# Patient Record
Sex: FEMALE | Race: WHITE | NOT HISPANIC OR LATINO
[De-identification: names, ages, dates, MRNs, and addresses within clinical notes are randomized per-mention and may not be internally consistent; named-entity substitution may affect disease eponyms.]

---

## 2017-06-21 ENCOUNTER — APPOINTMENT (OUTPATIENT)
Dept: SURGERY | Facility: CLINIC | Age: 52
End: 2017-06-21

## 2017-06-22 ENCOUNTER — RESULT REVIEW (OUTPATIENT)
Age: 52
End: 2017-06-22

## 2017-06-22 ENCOUNTER — APPOINTMENT (OUTPATIENT)
Dept: ULTRASOUND IMAGING | Facility: IMAGING CENTER | Age: 52
End: 2017-06-22

## 2017-06-22 ENCOUNTER — OUTPATIENT (OUTPATIENT)
Dept: OUTPATIENT SERVICES | Facility: HOSPITAL | Age: 52
LOS: 1 days | End: 2017-06-22
Payer: COMMERCIAL

## 2017-06-22 DIAGNOSIS — Z00.8 ENCOUNTER FOR OTHER GENERAL EXAMINATION: ICD-10-CM

## 2017-06-22 PROCEDURE — 19083 BX BREAST 1ST LESION US IMAG: CPT

## 2017-06-22 PROCEDURE — 77065 DX MAMMO INCL CAD UNI: CPT

## 2017-06-22 PROCEDURE — 88360 TUMOR IMMUNOHISTOCHEM/MANUAL: CPT

## 2017-06-22 PROCEDURE — 88305 TISSUE EXAM BY PATHOLOGIST: CPT

## 2017-06-22 PROCEDURE — A4648: CPT

## 2017-06-23 LAB — SURGICAL PATHOLOGY STUDY: SIGNIFICANT CHANGE UP

## 2017-06-26 ENCOUNTER — APPOINTMENT (OUTPATIENT)
Dept: MRI IMAGING | Facility: CLINIC | Age: 52
End: 2017-06-26

## 2017-06-26 ENCOUNTER — OUTPATIENT (OUTPATIENT)
Dept: OUTPATIENT SERVICES | Facility: HOSPITAL | Age: 52
LOS: 1 days | End: 2017-06-26
Payer: COMMERCIAL

## 2017-06-26 DIAGNOSIS — Z00.8 ENCOUNTER FOR OTHER GENERAL EXAMINATION: ICD-10-CM

## 2017-06-26 PROCEDURE — C8937: CPT

## 2017-06-26 PROCEDURE — C8908: CPT

## 2017-06-27 ENCOUNTER — OUTPATIENT (OUTPATIENT)
Dept: OUTPATIENT SERVICES | Facility: HOSPITAL | Age: 52
LOS: 1 days | Discharge: HOME | End: 2017-06-27

## 2017-06-27 ENCOUNTER — APPOINTMENT (OUTPATIENT)
Dept: NUCLEAR MEDICINE | Facility: IMAGING CENTER | Age: 52
End: 2017-06-27

## 2017-06-27 ENCOUNTER — APPOINTMENT (OUTPATIENT)
Dept: CT IMAGING | Facility: IMAGING CENTER | Age: 52
End: 2017-06-27

## 2017-06-27 ENCOUNTER — OUTPATIENT (OUTPATIENT)
Dept: OUTPATIENT SERVICES | Facility: HOSPITAL | Age: 52
LOS: 1 days | End: 2017-06-27
Payer: COMMERCIAL

## 2017-06-27 DIAGNOSIS — Z00.8 ENCOUNTER FOR OTHER GENERAL EXAMINATION: ICD-10-CM

## 2017-06-27 PROCEDURE — 74177 CT ABD & PELVIS W/CONTRAST: CPT

## 2017-06-27 PROCEDURE — A9561: CPT

## 2017-06-27 PROCEDURE — 71260 CT THORAX DX C+: CPT

## 2017-06-27 PROCEDURE — 78306 BONE IMAGING WHOLE BODY: CPT

## 2017-06-29 ENCOUNTER — APPOINTMENT (OUTPATIENT)
Dept: HEMATOLOGY ONCOLOGY | Facility: CLINIC | Age: 52
End: 2017-06-29

## 2017-06-29 ENCOUNTER — RESULT REVIEW (OUTPATIENT)
Age: 52
End: 2017-06-29

## 2017-06-29 ENCOUNTER — TRANSCRIPTION ENCOUNTER (OUTPATIENT)
Age: 52
End: 2017-06-29

## 2017-06-29 VITALS
OXYGEN SATURATION: 98 % | TEMPERATURE: 98.5 F | HEART RATE: 112 BPM | WEIGHT: 238.1 LBS | BODY MASS INDEX: 37.37 KG/M2 | SYSTOLIC BLOOD PRESSURE: 130 MMHG | HEIGHT: 66.93 IN | DIASTOLIC BLOOD PRESSURE: 88 MMHG | RESPIRATION RATE: 16 BRPM

## 2017-06-29 DIAGNOSIS — Z87.42 PERSONAL HISTORY OF OTHER DISEASES OF THE FEMALE GENITAL TRACT: ICD-10-CM

## 2017-06-29 DIAGNOSIS — N63 UNSPECIFIED LUMP IN BREAST: ICD-10-CM

## 2017-06-29 DIAGNOSIS — N64.89 OTHER SPECIFIED DISORDERS OF BREAST: ICD-10-CM

## 2017-06-29 DIAGNOSIS — Z78.9 OTHER SPECIFIED HEALTH STATUS: ICD-10-CM

## 2017-06-29 DIAGNOSIS — Z80.3 FAMILY HISTORY OF MALIGNANT NEOPLASM OF BREAST: ICD-10-CM

## 2017-06-29 LAB
HCT VFR BLD CALC: 46 % — HIGH (ref 34.5–45)
HGB BLD-MCNC: 15.5 G/DL — SIGNIFICANT CHANGE UP (ref 11.5–15.5)
MCHC RBC-ENTMCNC: 30.2 PG — SIGNIFICANT CHANGE UP (ref 27–34)
MCHC RBC-ENTMCNC: 33.8 G/DL — SIGNIFICANT CHANGE UP (ref 32–36)
MCV RBC AUTO: 89.3 FL — SIGNIFICANT CHANGE UP (ref 80–100)
PLATELET # BLD AUTO: 249 K/UL — SIGNIFICANT CHANGE UP (ref 150–400)
RBC # BLD: 5.15 M/UL — SIGNIFICANT CHANGE UP (ref 3.8–5.2)
RBC # FLD: 11.4 % — SIGNIFICANT CHANGE UP (ref 10.3–14.5)
WBC # BLD: 9.3 K/UL — SIGNIFICANT CHANGE UP (ref 3.8–10.5)
WBC # FLD AUTO: 9.3 K/UL — SIGNIFICANT CHANGE UP (ref 3.8–10.5)

## 2017-06-30 PROBLEM — Z80.3 FAMILY HISTORY OF MALIGNANT NEOPLASM OF BREAST: Status: ACTIVE | Noted: 2017-06-30

## 2017-06-30 PROBLEM — Z87.42 HISTORY OF POLYCYSTIC OVARIAN SYNDROME: Status: RESOLVED | Noted: 2017-06-30 | Resolved: 2017-06-30

## 2017-06-30 PROBLEM — Z78.9 SOCIAL ALCOHOL USE: Status: ACTIVE | Noted: 2017-06-30

## 2017-06-30 LAB
25(OH)D3 SERPL-MCNC: 28.8 NG/ML
ALBUMIN SERPL ELPH-MCNC: 4.6 G/DL
ALP BLD-CCNC: 81 U/L
ALT SERPL-CCNC: 20 U/L
ANION GAP SERPL CALC-SCNC: 15 MMOL/L
APTT BLD: 30.8 SEC
AST SERPL-CCNC: 23 U/L
BILIRUB SERPL-MCNC: 0.4 MG/DL
BUN SERPL-MCNC: 12 MG/DL
CALCIUM SERPL-MCNC: 10 MG/DL
CANCER AG27-29 SERPL-ACNC: 19.4 U/ML
CEA SERPL-MCNC: 3.3 NG/ML
CHLORIDE SERPL-SCNC: 102 MMOL/L
CO2 SERPL-SCNC: 25 MMOL/L
CREAT SERPL-MCNC: 0.87 MG/DL
GLUCOSE SERPL-MCNC: 102 MG/DL
HBV CORE IGG+IGM SER QL: NONREACTIVE
HBV CORE IGM SER QL: NONREACTIVE
HBV SURFACE AB SER QL: NONREACTIVE
HBV SURFACE AG SER QL: NONREACTIVE
HCV AB SER QL: NONREACTIVE
HCV S/CO RATIO: 0.08 S/CO
INR PPP: 1.1 RATIO
POTASSIUM SERPL-SCNC: 4.1 MMOL/L
PROT SERPL-MCNC: 7.7 G/DL
PT BLD: 12.4 SEC
SODIUM SERPL-SCNC: 142 MMOL/L

## 2017-06-30 RX ORDER — CHROMIUM 200 MCG
1000 TABLET ORAL DAILY
Refills: 0 | Status: ACTIVE | COMMUNITY
Start: 2017-06-30

## 2017-07-03 ENCOUNTER — APPOINTMENT (OUTPATIENT)
Dept: OBGYN | Facility: CLINIC | Age: 52
End: 2017-07-03

## 2017-07-03 DIAGNOSIS — C50.911 MALIGNANT NEOPLASM OF UNSPECIFIED SITE OF RIGHT FEMALE BREAST: ICD-10-CM

## 2017-07-05 ENCOUNTER — APPOINTMENT (OUTPATIENT)
Dept: ULTRASOUND IMAGING | Facility: CLINIC | Age: 52
End: 2017-07-05

## 2017-07-05 ENCOUNTER — OUTPATIENT (OUTPATIENT)
Dept: OUTPATIENT SERVICES | Facility: HOSPITAL | Age: 52
LOS: 1 days | End: 2017-07-05
Payer: COMMERCIAL

## 2017-07-05 DIAGNOSIS — Z00.8 ENCOUNTER FOR OTHER GENERAL EXAMINATION: ICD-10-CM

## 2017-07-05 PROCEDURE — 76856 US EXAM PELVIC COMPLETE: CPT

## 2017-07-05 PROCEDURE — 76830 TRANSVAGINAL US NON-OB: CPT

## 2017-07-11 ENCOUNTER — OUTPATIENT (OUTPATIENT)
Dept: OUTPATIENT SERVICES | Facility: HOSPITAL | Age: 52
LOS: 1 days | End: 2017-07-11
Payer: COMMERCIAL

## 2017-07-11 VITALS
OXYGEN SATURATION: 100 % | HEART RATE: 99 BPM | RESPIRATION RATE: 16 BRPM | TEMPERATURE: 99 F | HEIGHT: 67 IN | WEIGHT: 240.08 LBS | SYSTOLIC BLOOD PRESSURE: 119 MMHG | DIASTOLIC BLOOD PRESSURE: 86 MMHG

## 2017-07-11 DIAGNOSIS — C50.919 MALIGNANT NEOPLASM OF UNSPECIFIED SITE OF UNSPECIFIED FEMALE BREAST: ICD-10-CM

## 2017-07-11 DIAGNOSIS — Z01.818 ENCOUNTER FOR OTHER PREPROCEDURAL EXAMINATION: ICD-10-CM

## 2017-07-11 DIAGNOSIS — Z98.891 HISTORY OF UTERINE SCAR FROM PREVIOUS SURGERY: Chronic | ICD-10-CM

## 2017-07-11 DIAGNOSIS — Z90.49 ACQUIRED ABSENCE OF OTHER SPECIFIED PARTS OF DIGESTIVE TRACT: Chronic | ICD-10-CM

## 2017-07-11 PROCEDURE — G0463: CPT

## 2017-07-11 RX ORDER — CEFOTETAN DISODIUM 1 G
2 VIAL (EA) INJECTION ONCE
Qty: 0 | Refills: 0 | Status: DISCONTINUED | OUTPATIENT
Start: 2017-07-14 | End: 2017-07-29

## 2017-07-11 NOTE — H&P PST ADULT - NSANTHOSAYNRD_GEN_A_CORE
No. DAMIR screening performed.  STOP BANG Legend: 0-2 = LOW Risk; 3-4 = INTERMEDIATE Risk; 5-8 = HIGH Risk

## 2017-07-11 NOTE — H&P PST ADULT - HISTORY OF PRESENT ILLNESS
52 year old female c/o recurrent mastitis x 3-4 years; recent work up showed breast cancer presents to [facility] for laparoscopic bilateral salpingo oophorectomy  possible exploratory laparotomy due to type of breast cancer

## 2017-07-14 ENCOUNTER — APPOINTMENT (OUTPATIENT)
Dept: OBGYN | Facility: HOSPITAL | Age: 52
End: 2017-07-14

## 2017-07-14 ENCOUNTER — TRANSCRIPTION ENCOUNTER (OUTPATIENT)
Age: 52
End: 2017-07-14

## 2017-07-14 ENCOUNTER — RESULT REVIEW (OUTPATIENT)
Age: 52
End: 2017-07-14

## 2017-07-14 ENCOUNTER — OUTPATIENT (OUTPATIENT)
Dept: OUTPATIENT SERVICES | Facility: HOSPITAL | Age: 52
LOS: 1 days | End: 2017-07-14
Payer: COMMERCIAL

## 2017-07-14 VITALS
DIASTOLIC BLOOD PRESSURE: 85 MMHG | TEMPERATURE: 98 F | RESPIRATION RATE: 20 BRPM | WEIGHT: 240.3 LBS | HEART RATE: 100 BPM | HEIGHT: 67 IN | SYSTOLIC BLOOD PRESSURE: 123 MMHG | OXYGEN SATURATION: 100 %

## 2017-07-14 DIAGNOSIS — Z90.49 ACQUIRED ABSENCE OF OTHER SPECIFIED PARTS OF DIGESTIVE TRACT: Chronic | ICD-10-CM

## 2017-07-14 DIAGNOSIS — Z98.891 HISTORY OF UTERINE SCAR FROM PREVIOUS SURGERY: Chronic | ICD-10-CM

## 2017-07-14 DIAGNOSIS — C50.919 MALIGNANT NEOPLASM OF UNSPECIFIED SITE OF UNSPECIFIED FEMALE BREAST: ICD-10-CM

## 2017-07-14 LAB — HCG UR QL: NEGATIVE — SIGNIFICANT CHANGE UP

## 2017-07-14 PROCEDURE — 88302 TISSUE EXAM BY PATHOLOGIST: CPT | Mod: 26

## 2017-07-14 PROCEDURE — 52000 CYSTOURETHROSCOPY: CPT | Mod: 59

## 2017-07-14 PROCEDURE — 58661 LAPAROSCOPY REMOVE ADNEXA: CPT | Mod: 22

## 2017-07-14 PROCEDURE — 88305 TISSUE EXAM BY PATHOLOGIST: CPT | Mod: 26

## 2017-07-14 RX ORDER — OXYCODONE HYDROCHLORIDE 5 MG/1
5 TABLET ORAL EVERY 4 HOURS
Qty: 0 | Refills: 0 | Status: DISCONTINUED | OUTPATIENT
Start: 2017-07-14 | End: 2017-07-14

## 2017-07-14 RX ORDER — ACETAMINOPHEN 500 MG
975 TABLET ORAL EVERY 6 HOURS
Qty: 0 | Refills: 0 | Status: DISCONTINUED | OUTPATIENT
Start: 2017-07-14 | End: 2017-07-29

## 2017-07-14 RX ORDER — LIDOCAINE HCL 20 MG/ML
0.2 VIAL (ML) INJECTION ONCE
Qty: 0 | Refills: 0 | Status: DISCONTINUED | OUTPATIENT
Start: 2017-07-14 | End: 2017-07-14

## 2017-07-14 RX ORDER — IBUPROFEN 200 MG
600 TABLET ORAL EVERY 6 HOURS
Qty: 0 | Refills: 0 | Status: DISCONTINUED | OUTPATIENT
Start: 2017-07-14 | End: 2017-07-29

## 2017-07-14 RX ORDER — HYDROMORPHONE HYDROCHLORIDE 2 MG/ML
0.5 INJECTION INTRAMUSCULAR; INTRAVENOUS; SUBCUTANEOUS
Qty: 0 | Refills: 0 | Status: DISCONTINUED | OUTPATIENT
Start: 2017-07-14 | End: 2017-07-15

## 2017-07-14 RX ORDER — SODIUM CHLORIDE 9 MG/ML
3 INJECTION INTRAMUSCULAR; INTRAVENOUS; SUBCUTANEOUS EVERY 8 HOURS
Qty: 0 | Refills: 0 | Status: DISCONTINUED | OUTPATIENT
Start: 2017-07-14 | End: 2017-07-14

## 2017-07-14 RX ORDER — ONDANSETRON 8 MG/1
4 TABLET, FILM COATED ORAL EVERY 4 HOURS
Qty: 0 | Refills: 0 | Status: DISCONTINUED | OUTPATIENT
Start: 2017-07-14 | End: 2017-07-15

## 2017-07-14 RX ORDER — SODIUM CHLORIDE 9 MG/ML
1000 INJECTION, SOLUTION INTRAVENOUS
Qty: 0 | Refills: 0 | Status: DISCONTINUED | OUTPATIENT
Start: 2017-07-14 | End: 2017-07-29

## 2017-07-14 RX ORDER — OXYCODONE HYDROCHLORIDE 5 MG/1
1 TABLET ORAL
Qty: 15 | Refills: 0 | OUTPATIENT
Start: 2017-07-14

## 2017-07-14 NOTE — BRIEF OPERATIVE NOTE - OPERATION/FINDINGS
extensive adnexal and bowel adhesions, fibroid uterus, no perforations or lesions noted in bladder on cystoscopy

## 2017-07-14 NOTE — ASU DISCHARGE PLAN (ADULT/PEDIATRIC). - MEDICATION SUMMARY - MEDICATIONS TO TAKE
I will START or STAY ON the medications listed below when I get home from the hospital:    oxyCODONE 5 mg oral tablet  -- 1 tab(s) by mouth every 6 hours, As Needed -for severe pain MDD:4  -- Caution federal law prohibits the transfer of this drug to any person other  than the person for whom it was prescribed.  It is very important that you take or use this exactly as directed.  Do not skip doses or discontinue unless directed by your doctor.  May cause drowsiness.  Alcohol may intensify this effect.  Use care when operating dangerous machinery.  This prescription cannot be refilled.  Using more of this medication than prescribed may cause serious breathing problems.    -- Indication: For pain    multivitamin  --   1 tab po daily  -- Indication: For home med I will START or STAY ON the medications listed below when I get home from the hospital:    oxyCODONE 5 mg oral tablet  -- 1 tab(s) by mouth every 6 hours, As Needed -for severe pain MDD:4  -- Caution federal law prohibits the transfer of this drug to any person other  than the person for whom it was prescribed.  It is very important that you take or use this exactly as directed.  Do not skip doses or discontinue unless directed by your doctor.  May cause drowsiness.  Alcohol may intensify this effect.  Use care when operating dangerous machinery.  This prescription cannot be refilled.  Using more of this medication than prescribed may cause serious breathing problems.    -- Indication: For pain    oxyCODONE 5 mg oral tablet  -- 1 tab(s) by mouth every 6 hours, As Needed -for severe pain MDD:4 Tabs  -- Caution federal law prohibits the transfer of this drug to any person other  than the person for whom it was prescribed.  It is very important that you take or use this exactly as directed.  Do not skip doses or discontinue unless directed by your doctor.  May cause drowsiness.  Alcohol may intensify this effect.  Use care when operating dangerous machinery.  This prescription cannot be refilled.  Using more of this medication than prescribed may cause serious breathing problems.    -- Indication: For pain    multivitamin  --   1 tab po daily  -- Indication: For home med

## 2017-07-14 NOTE — BRIEF OPERATIVE NOTE - PROCEDURE
Curettage, uterus with dilation  07/14/2017    Active  KMUNDEBORAH  Salpingo-oophorectomy, bilateral, laparoscopic  07/14/2017    Active  KMUNDEBORAH  Lysis of adhesions  07/14/2017    Active  KMUNDEBORAH  Cystoscopy  07/14/2017    Active  KMUNIZ

## 2017-07-15 ENCOUNTER — TRANSCRIPTION ENCOUNTER (OUTPATIENT)
Age: 52
End: 2017-07-15

## 2017-07-15 VITALS
HEART RATE: 72 BPM | DIASTOLIC BLOOD PRESSURE: 64 MMHG | OXYGEN SATURATION: 100 % | SYSTOLIC BLOOD PRESSURE: 108 MMHG | TEMPERATURE: 98 F | RESPIRATION RATE: 16 BRPM

## 2017-07-15 DIAGNOSIS — K66.0 PERITONEAL ADHESIONS (POSTPROCEDURAL) (POSTINFECTION): ICD-10-CM

## 2017-07-15 DIAGNOSIS — C50.919 MALIGNANT NEOPLASM OF UNSPECIFIED SITE OF UNSPECIFIED FEMALE BREAST: ICD-10-CM

## 2017-07-15 PROCEDURE — C1889: CPT

## 2017-07-15 PROCEDURE — 58661 LAPAROSCOPY REMOVE ADNEXA: CPT | Mod: 50

## 2017-07-15 PROCEDURE — 88302 TISSUE EXAM BY PATHOLOGIST: CPT

## 2017-07-15 PROCEDURE — 88305 TISSUE EXAM BY PATHOLOGIST: CPT

## 2017-07-15 PROCEDURE — 58558 HYSTEROSCOPY BIOPSY: CPT

## 2017-07-15 PROCEDURE — C1769: CPT

## 2017-07-15 PROCEDURE — 81025 URINE PREGNANCY TEST: CPT

## 2017-07-15 PROCEDURE — C1758: CPT

## 2017-07-15 RX ORDER — OXYCODONE HYDROCHLORIDE 5 MG/1
1 TABLET ORAL
Qty: 20 | Refills: 0 | OUTPATIENT
Start: 2017-07-15

## 2017-07-15 RX ADMIN — Medication 600 MILLIGRAM(S): at 00:28

## 2017-07-15 RX ADMIN — SODIUM CHLORIDE 125 MILLILITER(S): 9 INJECTION, SOLUTION INTRAVENOUS at 00:29

## 2017-07-15 NOTE — CHART NOTE - NSCHARTNOTEFT_GEN_A_CORE
Patient seen and examined at bedside. No acute complaints. Pain well controlled.  Patient tolerating clears. Has not yet passed flatus. Patient is voiding spontaneously. Denies CP, SOB, N/V, fevers, and chills.    Vital Signs Last 24 Hours  T(C): 36.5 (07-15-17 @ 00:00), Max: 36.9 (07-14-17 @ 13:43)  HR: 76 (07-15-17 @ 00:00) (70 - 114)  BP: 111/62 (07-15-17 @ 00:00) (95/53 - 129/44)  RR: 16 (07-15-17 @ 00:00) (16 - 20)  SpO2: 100% (07-15-17 @ 00:00) (96% - 100%)    I&O's Summary    14 Jul 2017 07:01  -  15 Jul 2017 00:54  --------------------------------------------------------  IN: 625 mL / OUT: 425 mL / NET: 200 mL        Physical Exam:  General: NAD  CV: NR, RR, S1, S2, no M/R/G  Lungs: CTA-B  Abdomen: Soft, non-distended, appropriately tender  Incision: CDI, ecchymosis but no erythema  Ext: No pain or swelling    Labs:      MEDICATIONS  (STANDING):  cefoTEtan  IVPB 2 Gram(s) IV Intermittent once  lactated ringers. 1000 milliLiter(s) (125 mL/Hr) IV Continuous <Continuous>  ibuprofen  Tablet 600 milliGRAM(s) Oral every 6 hours  acetaminophen   Tablet. 975 milliGRAM(s) Oral every 6 hours    MEDICATIONS  (PRN):  HYDROmorphone  Injectable 0.5 milliGRAM(s) IV Push every 10 minutes PRN Moderate Pain (4 - 6)  ondansetron Injectable 4 milliGRAM(s) IV Push every 4 hours PRN Nausea and/or Vomiting  oxyCODONE    IR 5 milliGRAM(s) Oral every 4 hours PRN Severe Pain (7 - 10)    AP 51 y/o woman s/p laparoscopic bilateral salpingectomy, extensive IGNACIO, D+C, and Cysto meeting all appropriated post-op milestones.  Patient is hemodynamically stable  Neuro: c/w oral analgesia as needed  CV: hemodynamically stable  Pulm: saturating well on room air, encourage incentive spirometry and ambulation  GI: continue with reg diet  : voiding adequately  Heme: ambulation for DVT ppx  Dispo: discharge home for outpatient follow-up in 2 weeks

## 2017-07-15 NOTE — CONSULT NOTE ADULT - PROBLEM SELECTOR RECOMMENDATION 9
I was called by Dr. Fishman for intraop operative consultation to evaluate and assist with Lap rx for dense adhesions from colon to Left adnexa. adhesions were sharply lysed, bowel was all intact, case was completed by Dr. Fishman - please see his dictation for details.

## 2017-07-15 NOTE — CONSULT NOTE ADULT - SUBJECTIVE AND OBJECTIVE BOX
GENERAL SURGERY CONSULT NOTE    HPI:      PAST MEDICAL & SURGICAL HISTORY:  Breast cancer: right breast with mets to the bone  History of : x2  History of cholecystectomy      REVIEW OF SYSTEMS:   Pertinent positives/negatives noted in HPI.     MEDICATIONS  (STANDING):  cefoTEtan  IVPB 2 Gram(s) IV Intermittent once  lactated ringers. 1000 milliLiter(s) (125 mL/Hr) IV Continuous <Continuous>  ibuprofen  Tablet 600 milliGRAM(s) Oral every 6 hours  acetaminophen   Tablet. 975 milliGRAM(s) Oral every 6 hours    MEDICATIONS  (PRN):  HYDROmorphone  Injectable 0.5 milliGRAM(s) IV Push every 10 minutes PRN Moderate Pain (4 - 6)  ondansetron Injectable 4 milliGRAM(s) IV Push every 4 hours PRN Nausea and/or Vomiting  oxyCODONE    IR 5 milliGRAM(s) Oral every 4 hours PRN Severe Pain (7 - 10)      Allergies    No Known Allergies    Intolerances        SOCIAL HISTORY:    FAMILY HISTORY:      Vital Signs Last 24 Hrs  T(C): 36.4 (2017 23:15), Max: 36.9 (2017 13:43)  T(F): 97.5 (2017 23:15), Max: 98.4 (2017 13:43)  HR: 77 (2017 23:30) (70 - 114)  BP: 108/86 (2017 23:30) (95/53 - 129/44)  BP(mean): --  RR: 16 (2017 23:30) (16 - 20)  SpO2: 96% (2017 23:30) (96% - 100%)    PHYSICAL EXAM:    LABS:

## 2017-07-19 LAB — SURGICAL PATHOLOGY STUDY: SIGNIFICANT CHANGE UP

## 2017-08-02 ENCOUNTER — APPOINTMENT (OUTPATIENT)
Dept: OBGYN | Facility: CLINIC | Age: 52
End: 2017-08-02
Payer: COMMERCIAL

## 2017-08-02 PROCEDURE — 99024 POSTOP FOLLOW-UP VISIT: CPT

## 2017-08-03 ENCOUNTER — OUTPATIENT (OUTPATIENT)
Dept: OUTPATIENT SERVICES | Facility: HOSPITAL | Age: 52
LOS: 1 days | Discharge: HOME | End: 2017-08-03

## 2017-08-03 DIAGNOSIS — Z90.49 ACQUIRED ABSENCE OF OTHER SPECIFIED PARTS OF DIGESTIVE TRACT: Chronic | ICD-10-CM

## 2017-08-03 DIAGNOSIS — C50.911 MALIGNANT NEOPLASM OF UNSPECIFIED SITE OF RIGHT FEMALE BREAST: ICD-10-CM

## 2017-08-03 DIAGNOSIS — Z98.891 HISTORY OF UTERINE SCAR FROM PREVIOUS SURGERY: Chronic | ICD-10-CM

## 2017-08-08 ENCOUNTER — RESULT REVIEW (OUTPATIENT)
Age: 52
End: 2017-08-08

## 2017-08-08 ENCOUNTER — APPOINTMENT (OUTPATIENT)
Dept: INFUSION THERAPY | Facility: HOSPITAL | Age: 52
End: 2017-08-08

## 2017-08-08 ENCOUNTER — APPOINTMENT (OUTPATIENT)
Dept: HEMATOLOGY ONCOLOGY | Facility: CLINIC | Age: 52
End: 2017-08-08
Payer: COMMERCIAL

## 2017-08-08 VITALS
HEART RATE: 82 BPM | OXYGEN SATURATION: 99 % | DIASTOLIC BLOOD PRESSURE: 90 MMHG | BODY MASS INDEX: 37.37 KG/M2 | RESPIRATION RATE: 16 BRPM | WEIGHT: 238.1 LBS | TEMPERATURE: 99 F | SYSTOLIC BLOOD PRESSURE: 130 MMHG

## 2017-08-08 LAB
BASOPHILS # BLD AUTO: 0 K/UL — SIGNIFICANT CHANGE UP (ref 0–0.2)
EOSINOPHIL # BLD AUTO: 0 K/UL — SIGNIFICANT CHANGE UP (ref 0–0.5)
EOSINOPHIL NFR BLD AUTO: 2 % — SIGNIFICANT CHANGE UP (ref 0–6)
HCT VFR BLD CALC: 40.3 % — SIGNIFICANT CHANGE UP (ref 34.5–45)
HGB BLD-MCNC: 14.1 G/DL — SIGNIFICANT CHANGE UP (ref 11.5–15.5)
LYMPHOCYTES # BLD AUTO: 1.1 K/UL — SIGNIFICANT CHANGE UP (ref 1–3.3)
LYMPHOCYTES # BLD AUTO: 43 % — SIGNIFICANT CHANGE UP (ref 13–44)
MCHC RBC-ENTMCNC: 31.8 PG — SIGNIFICANT CHANGE UP (ref 27–34)
MCHC RBC-ENTMCNC: 35.1 G/DL — SIGNIFICANT CHANGE UP (ref 32–36)
MCV RBC AUTO: 90.5 FL — SIGNIFICANT CHANGE UP (ref 80–100)
MONOCYTES # BLD AUTO: 0.1 K/UL — SIGNIFICANT CHANGE UP (ref 0–0.9)
MONOCYTES NFR BLD AUTO: 3 % — SIGNIFICANT CHANGE UP (ref 2–14)
NEUTROPHILS # BLD AUTO: 1.4 K/UL — LOW (ref 1.8–7.4)
NEUTROPHILS NFR BLD AUTO: 50 % — SIGNIFICANT CHANGE UP (ref 43–77)
NEUTS BAND # BLD: 2 % — SIGNIFICANT CHANGE UP (ref 0–8)
PLAT MORPH BLD: NORMAL — SIGNIFICANT CHANGE UP
PLATELET # BLD AUTO: 201 K/UL — SIGNIFICANT CHANGE UP (ref 150–400)
RBC # BLD: 4.45 M/UL — SIGNIFICANT CHANGE UP (ref 3.8–5.2)
RBC # FLD: 12 % — SIGNIFICANT CHANGE UP (ref 10.3–14.5)
RBC BLD AUTO: NORMAL — SIGNIFICANT CHANGE UP
WBC # BLD: 2.6 K/UL — LOW (ref 3.8–10.5)
WBC # FLD AUTO: 2.6 K/UL — LOW (ref 3.8–10.5)

## 2017-08-08 PROCEDURE — 99215 OFFICE O/P EST HI 40 MIN: CPT

## 2017-08-08 RX ORDER — SULFAMETHOXAZOLE AND TRIMETHOPRIM 400; 80 MG/1; MG/1
400-80 TABLET ORAL
Qty: 14 | Refills: 0 | Status: DISCONTINUED | COMMUNITY
Start: 2017-04-21 | End: 2017-08-08

## 2017-08-08 RX ORDER — CHLORHEXIDINE GLUCONATE 4 %
LIQUID (ML) TOPICAL DAILY
Refills: 0 | Status: DISCONTINUED | COMMUNITY
Start: 2017-06-30 | End: 2017-08-08

## 2017-08-08 RX ORDER — OXYCODONE 5 MG/1
5 TABLET ORAL
Qty: 20 | Refills: 0 | Status: DISCONTINUED | COMMUNITY
Start: 2017-07-15 | End: 2017-08-08

## 2017-08-08 RX ORDER — PNV NO.95/FERROUS FUM/FOLIC AC 28MG-0.8MG
100 TABLET ORAL DAILY
Refills: 0 | Status: DISCONTINUED | COMMUNITY
Start: 2017-06-30 | End: 2017-08-08

## 2017-08-09 DIAGNOSIS — C79.51 SECONDARY MALIGNANT NEOPLASM OF BONE: ICD-10-CM

## 2017-08-09 LAB
ALBUMIN SERPL ELPH-MCNC: 4.4 G/DL
ALP BLD-CCNC: 80 U/L
ALT SERPL-CCNC: 20 U/L
ANION GAP SERPL CALC-SCNC: 15 MMOL/L
AST SERPL-CCNC: 19 U/L
BILIRUB SERPL-MCNC: 0.3 MG/DL
BUN SERPL-MCNC: 19 MG/DL
CALCIUM SERPL-MCNC: 9.6 MG/DL
CANCER AG27-29 SERPL-ACNC: 16 U/ML
CEA SERPL-MCNC: 3.2 NG/ML
CHLORIDE SERPL-SCNC: 104 MMOL/L
CO2 SERPL-SCNC: 23 MMOL/L
CREAT SERPL-MCNC: 0.96 MG/DL
GLUCOSE SERPL-MCNC: 101 MG/DL
POTASSIUM SERPL-SCNC: 4.3 MMOL/L
PROT SERPL-MCNC: 7.3 G/DL
SODIUM SERPL-SCNC: 142 MMOL/L
VIT B12 SERPL-MCNC: 347 PG/ML

## 2017-08-22 ENCOUNTER — RESULT REVIEW (OUTPATIENT)
Age: 52
End: 2017-08-22

## 2017-08-22 ENCOUNTER — APPOINTMENT (OUTPATIENT)
Dept: HEMATOLOGY ONCOLOGY | Facility: CLINIC | Age: 52
End: 2017-08-22
Payer: COMMERCIAL

## 2017-08-22 VITALS
TEMPERATURE: 98.5 F | OXYGEN SATURATION: 98 % | WEIGHT: 238.1 LBS | BODY MASS INDEX: 37.37 KG/M2 | RESPIRATION RATE: 16 BRPM | HEART RATE: 90 BPM | SYSTOLIC BLOOD PRESSURE: 120 MMHG | DIASTOLIC BLOOD PRESSURE: 80 MMHG

## 2017-08-22 LAB
ALBUMIN SERPL ELPH-MCNC: 4.1 G/DL
ALP BLD-CCNC: 85 U/L
ALT SERPL-CCNC: 16 U/L
ANION GAP SERPL CALC-SCNC: 12 MMOL/L
AST SERPL-CCNC: 18 U/L
BILIRUB SERPL-MCNC: 0.2 MG/DL
BUN SERPL-MCNC: 18 MG/DL
CALCIUM SERPL-MCNC: 9 MG/DL
CEA SERPL-MCNC: 2.5 NG/ML
CHLORIDE SERPL-SCNC: 106 MMOL/L
CO2 SERPL-SCNC: 24 MMOL/L
CREAT SERPL-MCNC: 0.96 MG/DL
GLUCOSE SERPL-MCNC: 90 MG/DL
HCT VFR BLD CALC: 40.7 % — SIGNIFICANT CHANGE UP (ref 34.5–45)
HGB BLD-MCNC: 14.3 G/DL — SIGNIFICANT CHANGE UP (ref 11.5–15.5)
MCHC RBC-ENTMCNC: 31.9 PG — SIGNIFICANT CHANGE UP (ref 27–34)
MCHC RBC-ENTMCNC: 35 G/DL — SIGNIFICANT CHANGE UP (ref 32–36)
MCV RBC AUTO: 91 FL — SIGNIFICANT CHANGE UP (ref 80–100)
PLATELET # BLD AUTO: 287 K/UL — SIGNIFICANT CHANGE UP (ref 150–400)
POTASSIUM SERPL-SCNC: 4.1 MMOL/L
PROT SERPL-MCNC: 7 G/DL
RBC # BLD: 4.48 M/UL — SIGNIFICANT CHANGE UP (ref 3.8–5.2)
RBC # FLD: 13.3 % — SIGNIFICANT CHANGE UP (ref 10.3–14.5)
SODIUM SERPL-SCNC: 142 MMOL/L
WBC # BLD: 4 K/UL — SIGNIFICANT CHANGE UP (ref 3.8–10.5)
WBC # FLD AUTO: 4 K/UL — SIGNIFICANT CHANGE UP (ref 3.8–10.5)

## 2017-08-22 PROCEDURE — 99215 OFFICE O/P EST HI 40 MIN: CPT

## 2017-08-23 LAB — CANCER AG27-29 SERPL-ACNC: 14.1 U/ML

## 2017-09-05 ENCOUNTER — LABORATORY RESULT (OUTPATIENT)
Age: 52
End: 2017-09-05

## 2017-09-15 ENCOUNTER — OUTPATIENT (OUTPATIENT)
Dept: OUTPATIENT SERVICES | Facility: HOSPITAL | Age: 52
LOS: 1 days | Discharge: HOME | End: 2017-09-15

## 2017-09-15 DIAGNOSIS — Z90.49 ACQUIRED ABSENCE OF OTHER SPECIFIED PARTS OF DIGESTIVE TRACT: Chronic | ICD-10-CM

## 2017-09-15 DIAGNOSIS — Z98.891 HISTORY OF UTERINE SCAR FROM PREVIOUS SURGERY: Chronic | ICD-10-CM

## 2017-09-15 DIAGNOSIS — C50.911 MALIGNANT NEOPLASM OF UNSPECIFIED SITE OF RIGHT FEMALE BREAST: ICD-10-CM

## 2017-09-19 ENCOUNTER — RESULT REVIEW (OUTPATIENT)
Age: 52
End: 2017-09-19

## 2017-09-19 ENCOUNTER — APPOINTMENT (OUTPATIENT)
Dept: HEMATOLOGY ONCOLOGY | Facility: CLINIC | Age: 52
End: 2017-09-19
Payer: COMMERCIAL

## 2017-09-19 ENCOUNTER — RX RENEWAL (OUTPATIENT)
Age: 52
End: 2017-09-19

## 2017-09-19 VITALS
DIASTOLIC BLOOD PRESSURE: 80 MMHG | OXYGEN SATURATION: 99 % | HEART RATE: 94 BPM | RESPIRATION RATE: 16 BRPM | WEIGHT: 238.1 LBS | SYSTOLIC BLOOD PRESSURE: 124 MMHG | TEMPERATURE: 98.2 F | BODY MASS INDEX: 37.37 KG/M2

## 2017-09-19 LAB
BASOPHILS # BLD AUTO: 0.1 K/UL — SIGNIFICANT CHANGE UP (ref 0–0.2)
BASOPHILS NFR BLD AUTO: 1.8 % — SIGNIFICANT CHANGE UP (ref 0–2)
EOSINOPHIL # BLD AUTO: 0 K/UL — SIGNIFICANT CHANGE UP (ref 0–0.5)
EOSINOPHIL NFR BLD AUTO: 1 % — SIGNIFICANT CHANGE UP (ref 0–6)
HCT VFR BLD CALC: 40.4 % — SIGNIFICANT CHANGE UP (ref 34.5–45)
HGB BLD-MCNC: 14.2 G/DL — SIGNIFICANT CHANGE UP (ref 11.5–15.5)
LYMPHOCYTES # BLD AUTO: 1.4 K/UL — SIGNIFICANT CHANGE UP (ref 1–3.3)
LYMPHOCYTES # BLD AUTO: 36.3 % — SIGNIFICANT CHANGE UP (ref 13–44)
MCHC RBC-ENTMCNC: 32.3 PG — SIGNIFICANT CHANGE UP (ref 27–34)
MCHC RBC-ENTMCNC: 35.2 G/DL — SIGNIFICANT CHANGE UP (ref 32–36)
MCV RBC AUTO: 91.9 FL — SIGNIFICANT CHANGE UP (ref 80–100)
MONOCYTES # BLD AUTO: 0.5 K/UL — SIGNIFICANT CHANGE UP (ref 0–0.9)
MONOCYTES NFR BLD AUTO: 14 % — SIGNIFICANT CHANGE UP (ref 2–14)
NEUTROPHILS # BLD AUTO: 1.8 K/UL — SIGNIFICANT CHANGE UP (ref 1.8–7.4)
NEUTROPHILS NFR BLD AUTO: 46.9 % — SIGNIFICANT CHANGE UP (ref 43–77)
PLATELET # BLD AUTO: 165 K/UL — SIGNIFICANT CHANGE UP (ref 150–400)
RBC # BLD: 4.4 M/UL — SIGNIFICANT CHANGE UP (ref 3.8–5.2)
RBC # FLD: 13.9 % — SIGNIFICANT CHANGE UP (ref 10.3–14.5)
WBC # BLD: 3.9 K/UL — SIGNIFICANT CHANGE UP (ref 3.8–10.5)
WBC # FLD AUTO: 3.9 K/UL — SIGNIFICANT CHANGE UP (ref 3.8–10.5)

## 2017-09-19 PROCEDURE — 99215 OFFICE O/P EST HI 40 MIN: CPT

## 2017-09-22 ENCOUNTER — RX RENEWAL (OUTPATIENT)
Age: 52
End: 2017-09-22

## 2017-09-25 LAB
ALBUMIN SERPL ELPH-MCNC: 4.3 G/DL
ALP BLD-CCNC: 72 U/L
ALT SERPL-CCNC: 41 U/L
ANION GAP SERPL CALC-SCNC: 15 MMOL/L
AST SERPL-CCNC: 23 U/L
BILIRUB SERPL-MCNC: 0.3 MG/DL
BUN SERPL-MCNC: 17 MG/DL
CALCIUM SERPL-MCNC: 9.3 MG/DL
CANCER AG27-29 SERPL-ACNC: 13.9 U/ML
CEA SERPL-MCNC: 2.3 NG/ML
CHLORIDE SERPL-SCNC: 104 MMOL/L
CO2 SERPL-SCNC: 24 MMOL/L
CREAT SERPL-MCNC: 0.97 MG/DL
GLUCOSE SERPL-MCNC: 94 MG/DL
POTASSIUM SERPL-SCNC: 4.4 MMOL/L
PROT SERPL-MCNC: 7.2 G/DL
SODIUM SERPL-SCNC: 143 MMOL/L

## 2017-09-27 ENCOUNTER — RX RENEWAL (OUTPATIENT)
Age: 52
End: 2017-09-27

## 2017-10-08 ENCOUNTER — FORM ENCOUNTER (OUTPATIENT)
Age: 52
End: 2017-10-08

## 2017-10-09 ENCOUNTER — APPOINTMENT (OUTPATIENT)
Dept: CT IMAGING | Facility: CLINIC | Age: 52
End: 2017-10-09
Payer: COMMERCIAL

## 2017-10-09 ENCOUNTER — OUTPATIENT (OUTPATIENT)
Dept: OUTPATIENT SERVICES | Facility: HOSPITAL | Age: 52
LOS: 1 days | End: 2017-10-09
Payer: COMMERCIAL

## 2017-10-09 DIAGNOSIS — Z98.891 HISTORY OF UTERINE SCAR FROM PREVIOUS SURGERY: Chronic | ICD-10-CM

## 2017-10-09 DIAGNOSIS — Z90.49 ACQUIRED ABSENCE OF OTHER SPECIFIED PARTS OF DIGESTIVE TRACT: Chronic | ICD-10-CM

## 2017-10-09 DIAGNOSIS — C50.911 MALIGNANT NEOPLASM OF UNSPECIFIED SITE OF RIGHT FEMALE BREAST: ICD-10-CM

## 2017-10-09 DIAGNOSIS — Z00.8 ENCOUNTER FOR OTHER GENERAL EXAMINATION: ICD-10-CM

## 2017-10-09 PROCEDURE — 71260 CT THORAX DX C+: CPT | Mod: 26

## 2017-10-09 PROCEDURE — 74177 CT ABD & PELVIS W/CONTRAST: CPT

## 2017-10-09 PROCEDURE — 74177 CT ABD & PELVIS W/CONTRAST: CPT | Mod: 26

## 2017-10-09 PROCEDURE — 71260 CT THORAX DX C+: CPT

## 2017-10-12 ENCOUNTER — RESULT REVIEW (OUTPATIENT)
Age: 52
End: 2017-10-12

## 2017-10-12 ENCOUNTER — APPOINTMENT (OUTPATIENT)
Dept: HEMATOLOGY ONCOLOGY | Facility: CLINIC | Age: 52
End: 2017-10-12
Payer: COMMERCIAL

## 2017-10-12 VITALS
TEMPERATURE: 99 F | HEART RATE: 117 BPM | BODY MASS INDEX: 37.89 KG/M2 | DIASTOLIC BLOOD PRESSURE: 95 MMHG | RESPIRATION RATE: 18 BRPM | SYSTOLIC BLOOD PRESSURE: 137 MMHG | WEIGHT: 241.4 LBS | OXYGEN SATURATION: 99 %

## 2017-10-12 LAB
BASOPHILS # BLD AUTO: 0.1 K/UL — SIGNIFICANT CHANGE UP (ref 0–0.2)
BASOPHILS NFR BLD AUTO: 2.6 % — HIGH (ref 0–2)
EOSINOPHIL # BLD AUTO: 0 K/UL — SIGNIFICANT CHANGE UP (ref 0–0.5)
EOSINOPHIL NFR BLD AUTO: 1.8 % — SIGNIFICANT CHANGE UP (ref 0–6)
HCT VFR BLD CALC: 40.4 % — SIGNIFICANT CHANGE UP (ref 34.5–45)
HGB BLD-MCNC: 14.3 G/DL — SIGNIFICANT CHANGE UP (ref 11.5–15.5)
LYMPHOCYTES # BLD AUTO: 1 K/UL — SIGNIFICANT CHANGE UP (ref 1–3.3)
LYMPHOCYTES # BLD AUTO: 39.8 % — SIGNIFICANT CHANGE UP (ref 13–44)
MCHC RBC-ENTMCNC: 33.9 PG — SIGNIFICANT CHANGE UP (ref 27–34)
MCHC RBC-ENTMCNC: 35.4 G/DL — SIGNIFICANT CHANGE UP (ref 32–36)
MCV RBC AUTO: 95.8 FL — SIGNIFICANT CHANGE UP (ref 80–100)
MONOCYTES # BLD AUTO: 0.2 K/UL — SIGNIFICANT CHANGE UP (ref 0–0.9)
MONOCYTES NFR BLD AUTO: 9.1 % — SIGNIFICANT CHANGE UP (ref 2–14)
NEUTROPHILS # BLD AUTO: 1.2 K/UL — LOW (ref 1.8–7.4)
NEUTROPHILS NFR BLD AUTO: 46.7 % — SIGNIFICANT CHANGE UP (ref 43–77)
PLAT MORPH BLD: NORMAL — SIGNIFICANT CHANGE UP
PLATELET # BLD AUTO: 174 K/UL — SIGNIFICANT CHANGE UP (ref 150–400)
RBC # BLD: 4.22 M/UL — SIGNIFICANT CHANGE UP (ref 3.8–5.2)
RBC # FLD: 14.3 % — SIGNIFICANT CHANGE UP (ref 10.3–14.5)
RBC BLD AUTO: NORMAL — SIGNIFICANT CHANGE UP
WBC # BLD: 2.5 K/UL — LOW (ref 3.8–10.5)
WBC # FLD AUTO: 2.5 K/UL — LOW (ref 3.8–10.5)

## 2017-10-12 PROCEDURE — 99215 OFFICE O/P EST HI 40 MIN: CPT

## 2017-10-23 LAB
ALBUMIN SERPL ELPH-MCNC: 4.5 G/DL
ALP BLD-CCNC: 61 U/L
ALT SERPL-CCNC: 18 U/L
ANION GAP SERPL CALC-SCNC: 11 MMOL/L
AST SERPL-CCNC: 14 U/L
BILIRUB SERPL-MCNC: 0.4 MG/DL
BUN SERPL-MCNC: 15 MG/DL
CALCIUM SERPL-MCNC: 9.9 MG/DL
CANCER AG27-29 SERPL-ACNC: 14.3 U/ML
CEA SERPL-MCNC: 2 NG/ML
CHLORIDE SERPL-SCNC: 104 MMOL/L
CO2 SERPL-SCNC: 26 MMOL/L
CREAT SERPL-MCNC: 0.87 MG/DL
GLUCOSE SERPL-MCNC: 107 MG/DL
POTASSIUM SERPL-SCNC: 4.5 MMOL/L
PROT SERPL-MCNC: 7.4 G/DL
SODIUM SERPL-SCNC: 141 MMOL/L

## 2017-11-08 ENCOUNTER — FORM ENCOUNTER (OUTPATIENT)
Age: 52
End: 2017-11-08

## 2017-11-09 ENCOUNTER — APPOINTMENT (OUTPATIENT)
Dept: MRI IMAGING | Facility: CLINIC | Age: 52
End: 2017-11-09
Payer: COMMERCIAL

## 2017-11-09 ENCOUNTER — OUTPATIENT (OUTPATIENT)
Dept: OUTPATIENT SERVICES | Facility: HOSPITAL | Age: 52
LOS: 1 days | End: 2017-11-09
Payer: COMMERCIAL

## 2017-11-09 DIAGNOSIS — Z90.49 ACQUIRED ABSENCE OF OTHER SPECIFIED PARTS OF DIGESTIVE TRACT: Chronic | ICD-10-CM

## 2017-11-09 DIAGNOSIS — Z00.8 ENCOUNTER FOR OTHER GENERAL EXAMINATION: ICD-10-CM

## 2017-11-09 DIAGNOSIS — Z98.891 HISTORY OF UTERINE SCAR FROM PREVIOUS SURGERY: Chronic | ICD-10-CM

## 2017-11-09 PROCEDURE — 70553 MRI BRAIN STEM W/O & W/DYE: CPT | Mod: 26

## 2017-11-09 PROCEDURE — 70553 MRI BRAIN STEM W/O & W/DYE: CPT

## 2017-11-09 PROCEDURE — A9585: CPT

## 2017-11-10 ENCOUNTER — RESULT REVIEW (OUTPATIENT)
Age: 52
End: 2017-11-10

## 2017-11-13 ENCOUNTER — RX RENEWAL (OUTPATIENT)
Age: 52
End: 2017-11-13

## 2017-11-14 ENCOUNTER — OUTPATIENT (OUTPATIENT)
Dept: OUTPATIENT SERVICES | Facility: HOSPITAL | Age: 52
LOS: 1 days | Discharge: HOME | End: 2017-11-14

## 2017-11-14 DIAGNOSIS — C50.911 MALIGNANT NEOPLASM OF UNSPECIFIED SITE OF RIGHT FEMALE BREAST: ICD-10-CM

## 2017-11-14 DIAGNOSIS — Z98.891 HISTORY OF UTERINE SCAR FROM PREVIOUS SURGERY: Chronic | ICD-10-CM

## 2017-11-14 DIAGNOSIS — Z90.49 ACQUIRED ABSENCE OF OTHER SPECIFIED PARTS OF DIGESTIVE TRACT: Chronic | ICD-10-CM

## 2017-11-17 ENCOUNTER — APPOINTMENT (OUTPATIENT)
Dept: INFUSION THERAPY | Facility: HOSPITAL | Age: 52
End: 2017-11-17

## 2017-11-17 ENCOUNTER — APPOINTMENT (OUTPATIENT)
Dept: HEMATOLOGY ONCOLOGY | Facility: CLINIC | Age: 52
End: 2017-11-17
Payer: COMMERCIAL

## 2017-11-17 ENCOUNTER — RESULT REVIEW (OUTPATIENT)
Age: 52
End: 2017-11-17

## 2017-11-17 VITALS
BODY MASS INDEX: 37.37 KG/M2 | DIASTOLIC BLOOD PRESSURE: 80 MMHG | TEMPERATURE: 98.2 F | SYSTOLIC BLOOD PRESSURE: 120 MMHG | RESPIRATION RATE: 16 BRPM | OXYGEN SATURATION: 99 % | HEART RATE: 103 BPM | WEIGHT: 238.1 LBS

## 2017-11-17 LAB
BASOPHILS # BLD AUTO: 0.1 K/UL — SIGNIFICANT CHANGE UP (ref 0–0.2)
BASOPHILS NFR BLD AUTO: 2.6 % — HIGH (ref 0–2)
EOSINOPHIL # BLD AUTO: 0.1 K/UL — SIGNIFICANT CHANGE UP (ref 0–0.5)
EOSINOPHIL NFR BLD AUTO: 1.4 % — SIGNIFICANT CHANGE UP (ref 0–6)
HCT VFR BLD CALC: 38.3 % — SIGNIFICANT CHANGE UP (ref 34.5–45)
HGB BLD-MCNC: 14 G/DL — SIGNIFICANT CHANGE UP (ref 11.5–15.5)
LYMPHOCYTES # BLD AUTO: 1.2 K/UL — SIGNIFICANT CHANGE UP (ref 1–3.3)
LYMPHOCYTES # BLD AUTO: 32.7 % — SIGNIFICANT CHANGE UP (ref 13–44)
MCHC RBC-ENTMCNC: 36 PG — HIGH (ref 27–34)
MCHC RBC-ENTMCNC: 36.7 G/DL — HIGH (ref 32–36)
MCV RBC AUTO: 98.1 FL — SIGNIFICANT CHANGE UP (ref 80–100)
MONOCYTES # BLD AUTO: 0.3 K/UL — SIGNIFICANT CHANGE UP (ref 0–0.9)
MONOCYTES NFR BLD AUTO: 9.2 % — SIGNIFICANT CHANGE UP (ref 2–14)
NEUTROPHILS # BLD AUTO: 2 K/UL — SIGNIFICANT CHANGE UP (ref 1.8–7.4)
NEUTROPHILS NFR BLD AUTO: 54.1 % — SIGNIFICANT CHANGE UP (ref 43–77)
PLATELET # BLD AUTO: 148 K/UL — LOW (ref 150–400)
RBC # BLD: 3.9 M/UL — SIGNIFICANT CHANGE UP (ref 3.8–5.2)
RBC # FLD: 12.8 % — SIGNIFICANT CHANGE UP (ref 10.3–14.5)
WBC # BLD: 3.6 K/UL — LOW (ref 3.8–10.5)
WBC # FLD AUTO: 3.6 K/UL — LOW (ref 3.8–10.5)

## 2017-11-17 PROCEDURE — 99215 OFFICE O/P EST HI 40 MIN: CPT

## 2017-11-20 DIAGNOSIS — C79.51 SECONDARY MALIGNANT NEOPLASM OF BONE: ICD-10-CM

## 2017-11-20 LAB
ALBUMIN SERPL ELPH-MCNC: 4.3 G/DL
ALP BLD-CCNC: 60 U/L
ALT SERPL-CCNC: 20 U/L
ANION GAP SERPL CALC-SCNC: 15 MMOL/L
AST SERPL-CCNC: 16 U/L
BILIRUB SERPL-MCNC: 0.4 MG/DL
BUN SERPL-MCNC: 20 MG/DL
CALCIUM SERPL-MCNC: 9.7 MG/DL
CANCER AG27-29 SERPL-ACNC: 15.4 U/ML
CEA SERPL-MCNC: 1.8 NG/ML
CHLORIDE SERPL-SCNC: 106 MMOL/L
CO2 SERPL-SCNC: 26 MMOL/L
CREAT SERPL-MCNC: 1.09 MG/DL
GLUCOSE SERPL-MCNC: 109 MG/DL
POTASSIUM SERPL-SCNC: 4.4 MMOL/L
PROT SERPL-MCNC: 7.2 G/DL
SODIUM SERPL-SCNC: 147 MMOL/L

## 2017-12-13 ENCOUNTER — OUTPATIENT (OUTPATIENT)
Dept: OUTPATIENT SERVICES | Facility: HOSPITAL | Age: 52
LOS: 1 days | Discharge: HOME | End: 2017-12-13

## 2017-12-13 ENCOUNTER — RX RENEWAL (OUTPATIENT)
Age: 52
End: 2017-12-13

## 2017-12-13 DIAGNOSIS — Z90.49 ACQUIRED ABSENCE OF OTHER SPECIFIED PARTS OF DIGESTIVE TRACT: Chronic | ICD-10-CM

## 2017-12-13 DIAGNOSIS — C50.911 MALIGNANT NEOPLASM OF UNSPECIFIED SITE OF RIGHT FEMALE BREAST: ICD-10-CM

## 2017-12-13 DIAGNOSIS — Z98.891 HISTORY OF UTERINE SCAR FROM PREVIOUS SURGERY: Chronic | ICD-10-CM

## 2017-12-13 DIAGNOSIS — C79.51 SECONDARY MALIGNANT NEOPLASM OF BONE: ICD-10-CM

## 2017-12-19 ENCOUNTER — RESULT REVIEW (OUTPATIENT)
Age: 52
End: 2017-12-19

## 2017-12-19 ENCOUNTER — APPOINTMENT (OUTPATIENT)
Dept: HEMATOLOGY ONCOLOGY | Facility: CLINIC | Age: 52
End: 2017-12-19
Payer: COMMERCIAL

## 2017-12-19 VITALS
RESPIRATION RATE: 16 BRPM | WEIGHT: 238.1 LBS | TEMPERATURE: 98.3 F | OXYGEN SATURATION: 100 % | DIASTOLIC BLOOD PRESSURE: 86 MMHG | BODY MASS INDEX: 37.37 KG/M2 | SYSTOLIC BLOOD PRESSURE: 120 MMHG | HEART RATE: 90 BPM

## 2017-12-19 LAB
HCT VFR BLD CALC: 39.5 % — SIGNIFICANT CHANGE UP (ref 34.5–45)
HGB BLD-MCNC: 14.4 G/DL — SIGNIFICANT CHANGE UP (ref 11.5–15.5)
MCHC RBC-ENTMCNC: 36.4 PG — HIGH (ref 27–34)
MCHC RBC-ENTMCNC: 36.5 G/DL — HIGH (ref 32–36)
MCV RBC AUTO: 99.8 FL — SIGNIFICANT CHANGE UP (ref 80–100)
PLATELET # BLD AUTO: 236 K/UL — SIGNIFICANT CHANGE UP (ref 150–400)
RBC # BLD: 3.96 M/UL — SIGNIFICANT CHANGE UP (ref 3.8–5.2)
RBC # FLD: 11.6 % — SIGNIFICANT CHANGE UP (ref 10.3–14.5)
WBC # BLD: 4 K/UL — SIGNIFICANT CHANGE UP (ref 3.8–10.5)
WBC # FLD AUTO: 4 K/UL — SIGNIFICANT CHANGE UP (ref 3.8–10.5)

## 2017-12-19 PROCEDURE — 99215 OFFICE O/P EST HI 40 MIN: CPT

## 2017-12-21 LAB
ALBUMIN SERPL ELPH-MCNC: 4.7 G/DL
ALP BLD-CCNC: 63 U/L
ALT SERPL-CCNC: 20 U/L
ANION GAP SERPL CALC-SCNC: 14 MMOL/L
AST SERPL-CCNC: 19 U/L
BILIRUB SERPL-MCNC: 0.3 MG/DL
BUN SERPL-MCNC: 20 MG/DL
CALCIUM SERPL-MCNC: 9.9 MG/DL
CANCER AG27-29 SERPL-ACNC: 14.3 U/ML
CEA SERPL-MCNC: 1.9 NG/ML
CHLORIDE SERPL-SCNC: 102 MMOL/L
CO2 SERPL-SCNC: 26 MMOL/L
CREAT SERPL-MCNC: 0.93 MG/DL
GLUCOSE SERPL-MCNC: 95 MG/DL
POTASSIUM SERPL-SCNC: 5.2 MMOL/L
PROT SERPL-MCNC: 7.4 G/DL
SODIUM SERPL-SCNC: 142 MMOL/L

## 2018-01-15 ENCOUNTER — FORM ENCOUNTER (OUTPATIENT)
Age: 53
End: 2018-01-15

## 2018-01-16 ENCOUNTER — APPOINTMENT (OUTPATIENT)
Dept: CT IMAGING | Facility: CLINIC | Age: 53
End: 2018-01-16
Payer: COMMERCIAL

## 2018-01-16 ENCOUNTER — OUTPATIENT (OUTPATIENT)
Dept: OUTPATIENT SERVICES | Facility: HOSPITAL | Age: 53
LOS: 1 days | End: 2018-01-16
Payer: COMMERCIAL

## 2018-01-16 DIAGNOSIS — Z90.49 ACQUIRED ABSENCE OF OTHER SPECIFIED PARTS OF DIGESTIVE TRACT: Chronic | ICD-10-CM

## 2018-01-16 DIAGNOSIS — Z98.891 HISTORY OF UTERINE SCAR FROM PREVIOUS SURGERY: Chronic | ICD-10-CM

## 2018-01-16 DIAGNOSIS — Z00.8 ENCOUNTER FOR OTHER GENERAL EXAMINATION: ICD-10-CM

## 2018-01-16 PROCEDURE — 74177 CT ABD & PELVIS W/CONTRAST: CPT | Mod: 26

## 2018-01-16 PROCEDURE — 74177 CT ABD & PELVIS W/CONTRAST: CPT

## 2018-01-16 PROCEDURE — 71260 CT THORAX DX C+: CPT | Mod: 26

## 2018-01-16 PROCEDURE — 71260 CT THORAX DX C+: CPT

## 2018-01-17 ENCOUNTER — OUTPATIENT (OUTPATIENT)
Dept: OUTPATIENT SERVICES | Facility: HOSPITAL | Age: 53
LOS: 1 days | Discharge: HOME | End: 2018-01-17

## 2018-01-17 DIAGNOSIS — Z90.49 ACQUIRED ABSENCE OF OTHER SPECIFIED PARTS OF DIGESTIVE TRACT: Chronic | ICD-10-CM

## 2018-01-17 DIAGNOSIS — Z98.891 HISTORY OF UTERINE SCAR FROM PREVIOUS SURGERY: Chronic | ICD-10-CM

## 2018-01-17 DIAGNOSIS — C79.51 SECONDARY MALIGNANT NEOPLASM OF BONE: ICD-10-CM

## 2018-01-17 DIAGNOSIS — C50.911 MALIGNANT NEOPLASM OF UNSPECIFIED SITE OF RIGHT FEMALE BREAST: ICD-10-CM

## 2018-01-18 ENCOUNTER — RESULT REVIEW (OUTPATIENT)
Age: 53
End: 2018-01-18

## 2018-01-18 ENCOUNTER — APPOINTMENT (OUTPATIENT)
Dept: HEMATOLOGY ONCOLOGY | Facility: CLINIC | Age: 53
End: 2018-01-18
Payer: COMMERCIAL

## 2018-01-18 VITALS
DIASTOLIC BLOOD PRESSURE: 80 MMHG | HEART RATE: 118 BPM | WEIGHT: 240.3 LBS | TEMPERATURE: 98.6 F | BODY MASS INDEX: 37.72 KG/M2 | SYSTOLIC BLOOD PRESSURE: 120 MMHG | RESPIRATION RATE: 16 BRPM | OXYGEN SATURATION: 98 %

## 2018-01-18 LAB
BASOPHILS # BLD AUTO: 0.1 K/UL — SIGNIFICANT CHANGE UP (ref 0–0.2)
BASOPHILS NFR BLD AUTO: 2.1 % — HIGH (ref 0–2)
EOSINOPHIL # BLD AUTO: 0.1 K/UL — SIGNIFICANT CHANGE UP (ref 0–0.5)
EOSINOPHIL NFR BLD AUTO: 1.6 % — SIGNIFICANT CHANGE UP (ref 0–6)
HCT VFR BLD CALC: 40.2 % — SIGNIFICANT CHANGE UP (ref 34.5–45)
HGB BLD-MCNC: 14.2 G/DL — SIGNIFICANT CHANGE UP (ref 11.5–15.5)
LYMPHOCYTES # BLD AUTO: 1.1 K/UL — SIGNIFICANT CHANGE UP (ref 1–3.3)
LYMPHOCYTES # BLD AUTO: 31.4 % — SIGNIFICANT CHANGE UP (ref 13–44)
MCHC RBC-ENTMCNC: 35.3 G/DL — SIGNIFICANT CHANGE UP (ref 32–36)
MCHC RBC-ENTMCNC: 35.5 PG — HIGH (ref 27–34)
MCV RBC AUTO: 101 FL — HIGH (ref 80–100)
MONOCYTES # BLD AUTO: 0.2 K/UL — SIGNIFICANT CHANGE UP (ref 0–0.9)
MONOCYTES NFR BLD AUTO: 4.7 % — SIGNIFICANT CHANGE UP (ref 2–14)
NEUTROPHILS # BLD AUTO: 2.1 K/UL — SIGNIFICANT CHANGE UP (ref 1.8–7.4)
NEUTROPHILS NFR BLD AUTO: 60.2 % — SIGNIFICANT CHANGE UP (ref 43–77)
PLATELET # BLD AUTO: 304 K/UL — SIGNIFICANT CHANGE UP (ref 150–400)
RBC # BLD: 4 M/UL — SIGNIFICANT CHANGE UP (ref 3.8–5.2)
RBC # FLD: 11.4 % — SIGNIFICANT CHANGE UP (ref 10.3–14.5)
WBC # BLD: 3.5 K/UL — LOW (ref 3.8–10.5)
WBC # FLD AUTO: 3.5 K/UL — LOW (ref 3.8–10.5)

## 2018-01-18 PROCEDURE — 99215 OFFICE O/P EST HI 40 MIN: CPT

## 2018-01-19 LAB
ALBUMIN SERPL ELPH-MCNC: 4.4 G/DL
ALP BLD-CCNC: 61 U/L
ALT SERPL-CCNC: 21 U/L
ANION GAP SERPL CALC-SCNC: 18 MMOL/L
AST SERPL-CCNC: 21 U/L
BILIRUB SERPL-MCNC: 0.4 MG/DL
BUN SERPL-MCNC: 20 MG/DL
CALCIUM SERPL-MCNC: 9.9 MG/DL
CANCER AG27-29 SERPL-ACNC: 15.6 U/ML
CEA SERPL-MCNC: 1.7 NG/ML
CHLORIDE SERPL-SCNC: 101 MMOL/L
CO2 SERPL-SCNC: 24 MMOL/L
CREAT SERPL-MCNC: 1.02 MG/DL
GLUCOSE SERPL-MCNC: 79 MG/DL
POTASSIUM SERPL-SCNC: 4.6 MMOL/L
PROT SERPL-MCNC: 7.3 G/DL
SODIUM SERPL-SCNC: 143 MMOL/L

## 2018-02-15 ENCOUNTER — APPOINTMENT (OUTPATIENT)
Dept: INFUSION THERAPY | Facility: HOSPITAL | Age: 53
End: 2018-02-15

## 2018-02-15 ENCOUNTER — RESULT REVIEW (OUTPATIENT)
Age: 53
End: 2018-02-15

## 2018-02-15 ENCOUNTER — APPOINTMENT (OUTPATIENT)
Dept: HEMATOLOGY ONCOLOGY | Facility: CLINIC | Age: 53
End: 2018-02-15
Payer: COMMERCIAL

## 2018-02-15 VITALS
DIASTOLIC BLOOD PRESSURE: 93 MMHG | WEIGHT: 242.5 LBS | OXYGEN SATURATION: 98 % | TEMPERATURE: 98.8 F | HEART RATE: 101 BPM | RESPIRATION RATE: 17 BRPM | SYSTOLIC BLOOD PRESSURE: 142 MMHG | BODY MASS INDEX: 38.06 KG/M2

## 2018-02-15 LAB
ALBUMIN SERPL ELPH-MCNC: 4.5 G/DL
ALP BLD-CCNC: 60 U/L
ALT SERPL-CCNC: 20 U/L
ANION GAP SERPL CALC-SCNC: 12 MMOL/L
AST SERPL-CCNC: 19 U/L
BASOPHILS # BLD AUTO: 0.1 K/UL — SIGNIFICANT CHANGE UP (ref 0–0.2)
BASOPHILS NFR BLD AUTO: 1.6 % — SIGNIFICANT CHANGE UP (ref 0–2)
BILIRUB SERPL-MCNC: 0.3 MG/DL
BUN SERPL-MCNC: 18 MG/DL
CALCIUM SERPL-MCNC: 9.6 MG/DL
CEA SERPL-MCNC: 1.6 NG/ML
CHLORIDE SERPL-SCNC: 105 MMOL/L
CO2 SERPL-SCNC: 25 MMOL/L
CREAT SERPL-MCNC: 0.97 MG/DL
EOSINOPHIL # BLD AUTO: 0.1 K/UL — SIGNIFICANT CHANGE UP (ref 0–0.5)
EOSINOPHIL NFR BLD AUTO: 1.9 % — SIGNIFICANT CHANGE UP (ref 0–6)
GLUCOSE SERPL-MCNC: 97 MG/DL
HCT VFR BLD CALC: 38.9 % — SIGNIFICANT CHANGE UP (ref 34.5–45)
HGB BLD-MCNC: 14.1 G/DL — SIGNIFICANT CHANGE UP (ref 11.5–15.5)
LYMPHOCYTES # BLD AUTO: 1.4 K/UL — SIGNIFICANT CHANGE UP (ref 1–3.3)
LYMPHOCYTES # BLD AUTO: 36.5 % — SIGNIFICANT CHANGE UP (ref 13–44)
MCHC RBC-ENTMCNC: 35.5 PG — HIGH (ref 27–34)
MCHC RBC-ENTMCNC: 36.1 G/DL — HIGH (ref 32–36)
MCV RBC AUTO: 98.5 FL — SIGNIFICANT CHANGE UP (ref 80–100)
MONOCYTES # BLD AUTO: 0.1 K/UL — SIGNIFICANT CHANGE UP (ref 0–0.9)
MONOCYTES NFR BLD AUTO: 3.8 % — SIGNIFICANT CHANGE UP (ref 2–14)
NEUTROPHILS # BLD AUTO: 2.2 K/UL — SIGNIFICANT CHANGE UP (ref 1.8–7.4)
NEUTROPHILS NFR BLD AUTO: 56.2 % — SIGNIFICANT CHANGE UP (ref 43–77)
PLATELET # BLD AUTO: 307 K/UL — SIGNIFICANT CHANGE UP (ref 150–400)
POTASSIUM SERPL-SCNC: 4.5 MMOL/L
PROT SERPL-MCNC: 7 G/DL
RBC # BLD: 3.95 M/UL — SIGNIFICANT CHANGE UP (ref 3.8–5.2)
RBC # FLD: 11.3 % — SIGNIFICANT CHANGE UP (ref 10.3–14.5)
SODIUM SERPL-SCNC: 142 MMOL/L
WBC # BLD: 3.8 K/UL — SIGNIFICANT CHANGE UP (ref 3.8–10.5)
WBC # FLD AUTO: 3.8 K/UL — SIGNIFICANT CHANGE UP (ref 3.8–10.5)

## 2018-02-15 PROCEDURE — 99215 OFFICE O/P EST HI 40 MIN: CPT

## 2018-02-16 LAB — CANCER AG27-29 SERPL-ACNC: 13 U/ML

## 2018-02-21 ENCOUNTER — RX RENEWAL (OUTPATIENT)
Age: 53
End: 2018-02-21

## 2018-03-08 ENCOUNTER — OUTPATIENT (OUTPATIENT)
Dept: OUTPATIENT SERVICES | Facility: HOSPITAL | Age: 53
LOS: 1 days | Discharge: HOME | End: 2018-03-08

## 2018-03-08 DIAGNOSIS — Z98.891 HISTORY OF UTERINE SCAR FROM PREVIOUS SURGERY: Chronic | ICD-10-CM

## 2018-03-08 DIAGNOSIS — Z90.49 ACQUIRED ABSENCE OF OTHER SPECIFIED PARTS OF DIGESTIVE TRACT: Chronic | ICD-10-CM

## 2018-03-08 DIAGNOSIS — C79.51 SECONDARY MALIGNANT NEOPLASM OF BONE: ICD-10-CM

## 2018-03-08 DIAGNOSIS — C50.911 MALIGNANT NEOPLASM OF UNSPECIFIED SITE OF RIGHT FEMALE BREAST: ICD-10-CM

## 2018-03-13 ENCOUNTER — APPOINTMENT (OUTPATIENT)
Dept: HEMATOLOGY ONCOLOGY | Facility: CLINIC | Age: 53
End: 2018-03-13
Payer: COMMERCIAL

## 2018-03-23 ENCOUNTER — APPOINTMENT (OUTPATIENT)
Dept: HEMATOLOGY ONCOLOGY | Facility: CLINIC | Age: 53
End: 2018-03-23
Payer: COMMERCIAL

## 2018-03-23 ENCOUNTER — RESULT REVIEW (OUTPATIENT)
Age: 53
End: 2018-03-23

## 2018-03-23 VITALS
HEART RATE: 110 BPM | BODY MASS INDEX: 38.51 KG/M2 | OXYGEN SATURATION: 97 % | DIASTOLIC BLOOD PRESSURE: 91 MMHG | SYSTOLIC BLOOD PRESSURE: 148 MMHG | TEMPERATURE: 98.5 F | RESPIRATION RATE: 16 BRPM | WEIGHT: 245.37 LBS

## 2018-03-23 LAB
BASOPHILS # BLD AUTO: 0 K/UL — SIGNIFICANT CHANGE UP (ref 0–0.2)
BASOPHILS NFR BLD AUTO: 1.3 % — SIGNIFICANT CHANGE UP (ref 0–2)
EOSINOPHIL # BLD AUTO: 0 K/UL — SIGNIFICANT CHANGE UP (ref 0–0.5)
EOSINOPHIL NFR BLD AUTO: 1.6 % — SIGNIFICANT CHANGE UP (ref 0–6)
HCT VFR BLD CALC: 37.6 % — SIGNIFICANT CHANGE UP (ref 34.5–45)
HGB BLD-MCNC: 13.5 G/DL — SIGNIFICANT CHANGE UP (ref 11.5–15.5)
LYMPHOCYTES # BLD AUTO: 1.3 K/UL — SIGNIFICANT CHANGE UP (ref 1–3.3)
LYMPHOCYTES # BLD AUTO: 43.8 % — SIGNIFICANT CHANGE UP (ref 13–44)
MCHC RBC-ENTMCNC: 35.8 PG — HIGH (ref 27–34)
MCHC RBC-ENTMCNC: 36 G/DL — SIGNIFICANT CHANGE UP (ref 32–36)
MCV RBC AUTO: 99.5 FL — SIGNIFICANT CHANGE UP (ref 80–100)
MONOCYTES # BLD AUTO: 0.2 K/UL — SIGNIFICANT CHANGE UP (ref 0–0.9)
MONOCYTES NFR BLD AUTO: 5.1 % — SIGNIFICANT CHANGE UP (ref 2–14)
NEUTROPHILS # BLD AUTO: 1.4 K/UL — LOW (ref 1.8–7.4)
NEUTROPHILS NFR BLD AUTO: 48.3 % — SIGNIFICANT CHANGE UP (ref 43–77)
PLATELET # BLD AUTO: 335 K/UL — SIGNIFICANT CHANGE UP (ref 150–400)
RBC # BLD: 3.78 M/UL — LOW (ref 3.8–5.2)
RBC # FLD: 11.7 % — SIGNIFICANT CHANGE UP (ref 10.3–14.5)
WBC # BLD: 3 K/UL — LOW (ref 3.8–10.5)
WBC # FLD AUTO: 3 K/UL — LOW (ref 3.8–10.5)

## 2018-03-23 PROCEDURE — 99215 OFFICE O/P EST HI 40 MIN: CPT

## 2018-03-27 LAB
ALBUMIN SERPL ELPH-MCNC: 4.4 G/DL
ALP BLD-CCNC: 63 U/L
ALT SERPL-CCNC: 17 U/L
ANION GAP SERPL CALC-SCNC: 13 MMOL/L
AST SERPL-CCNC: 17 U/L
BILIRUB SERPL-MCNC: 0.3 MG/DL
BUN SERPL-MCNC: 22 MG/DL
CALCIUM SERPL-MCNC: 9.9 MG/DL
CANCER AG27-29 SERPL-ACNC: 13 U/ML
CEA SERPL-MCNC: 1.8 NG/ML
CHLORIDE SERPL-SCNC: 104 MMOL/L
CO2 SERPL-SCNC: 26 MMOL/L
CREAT SERPL-MCNC: 1.02 MG/DL
GLUCOSE SERPL-MCNC: 134 MG/DL
POTASSIUM SERPL-SCNC: 4.1 MMOL/L
PROT SERPL-MCNC: 7.2 G/DL
SODIUM SERPL-SCNC: 143 MMOL/L

## 2018-04-05 ENCOUNTER — OUTPATIENT (OUTPATIENT)
Dept: OUTPATIENT SERVICES | Facility: HOSPITAL | Age: 53
LOS: 1 days | Discharge: HOME | End: 2018-04-05

## 2018-04-05 DIAGNOSIS — C50.911 MALIGNANT NEOPLASM OF UNSPECIFIED SITE OF RIGHT FEMALE BREAST: ICD-10-CM

## 2018-04-05 DIAGNOSIS — C79.51 SECONDARY MALIGNANT NEOPLASM OF BONE: ICD-10-CM

## 2018-04-05 DIAGNOSIS — Z90.49 ACQUIRED ABSENCE OF OTHER SPECIFIED PARTS OF DIGESTIVE TRACT: Chronic | ICD-10-CM

## 2018-04-05 DIAGNOSIS — Z98.891 HISTORY OF UTERINE SCAR FROM PREVIOUS SURGERY: Chronic | ICD-10-CM

## 2018-04-08 ENCOUNTER — FORM ENCOUNTER (OUTPATIENT)
Age: 53
End: 2018-04-08

## 2018-04-09 ENCOUNTER — APPOINTMENT (OUTPATIENT)
Dept: CT IMAGING | Facility: CLINIC | Age: 53
End: 2018-04-09
Payer: COMMERCIAL

## 2018-04-09 ENCOUNTER — OUTPATIENT (OUTPATIENT)
Dept: OUTPATIENT SERVICES | Facility: HOSPITAL | Age: 53
LOS: 1 days | End: 2018-04-09
Payer: COMMERCIAL

## 2018-04-09 DIAGNOSIS — Z00.8 ENCOUNTER FOR OTHER GENERAL EXAMINATION: ICD-10-CM

## 2018-04-09 DIAGNOSIS — Z90.49 ACQUIRED ABSENCE OF OTHER SPECIFIED PARTS OF DIGESTIVE TRACT: Chronic | ICD-10-CM

## 2018-04-09 DIAGNOSIS — Z98.891 HISTORY OF UTERINE SCAR FROM PREVIOUS SURGERY: Chronic | ICD-10-CM

## 2018-04-09 PROCEDURE — 71260 CT THORAX DX C+: CPT

## 2018-04-09 PROCEDURE — 74177 CT ABD & PELVIS W/CONTRAST: CPT | Mod: 26

## 2018-04-09 PROCEDURE — 74177 CT ABD & PELVIS W/CONTRAST: CPT

## 2018-04-09 PROCEDURE — 71260 CT THORAX DX C+: CPT | Mod: 26

## 2018-04-10 ENCOUNTER — RESULT REVIEW (OUTPATIENT)
Age: 53
End: 2018-04-10

## 2018-04-10 ENCOUNTER — APPOINTMENT (OUTPATIENT)
Dept: HEMATOLOGY ONCOLOGY | Facility: CLINIC | Age: 53
End: 2018-04-10
Payer: COMMERCIAL

## 2018-04-10 VITALS
SYSTOLIC BLOOD PRESSURE: 122 MMHG | BODY MASS INDEX: 38.06 KG/M2 | DIASTOLIC BLOOD PRESSURE: 87 MMHG | WEIGHT: 242.51 LBS | RESPIRATION RATE: 16 BRPM | HEART RATE: 110 BPM | OXYGEN SATURATION: 98 % | TEMPERATURE: 98.5 F

## 2018-04-10 LAB
HCT VFR BLD CALC: 39.6 % — SIGNIFICANT CHANGE UP (ref 34.5–45)
HGB BLD-MCNC: 14.1 G/DL — SIGNIFICANT CHANGE UP (ref 11.5–15.5)
MCHC RBC-ENTMCNC: 35.1 PG — HIGH (ref 27–34)
MCHC RBC-ENTMCNC: 35.6 G/DL — SIGNIFICANT CHANGE UP (ref 32–36)
MCV RBC AUTO: 98.8 FL — SIGNIFICANT CHANGE UP (ref 80–100)
PLATELET # BLD AUTO: 291 K/UL — SIGNIFICANT CHANGE UP (ref 150–400)
RBC # BLD: 4.01 M/UL — SIGNIFICANT CHANGE UP (ref 3.8–5.2)
RBC # FLD: 11.7 % — SIGNIFICANT CHANGE UP (ref 10.3–14.5)
WBC # BLD: 4 K/UL — SIGNIFICANT CHANGE UP (ref 3.8–10.5)
WBC # FLD AUTO: 4 K/UL — SIGNIFICANT CHANGE UP (ref 3.8–10.5)

## 2018-04-10 PROCEDURE — 99215 OFFICE O/P EST HI 40 MIN: CPT

## 2018-04-19 LAB
ALBUMIN SERPL ELPH-MCNC: 4.2 G/DL
ALP BLD-CCNC: 67 U/L
ALT SERPL-CCNC: 18 U/L
ANION GAP SERPL CALC-SCNC: 14 MMOL/L
AST SERPL-CCNC: 16 U/L
BILIRUB SERPL-MCNC: 0.3 MG/DL
BUN SERPL-MCNC: 18 MG/DL
CALCIUM SERPL-MCNC: 9.6 MG/DL
CANCER AG27-29 SERPL-ACNC: 14.1 U/ML
CEA SERPL-MCNC: 1.9 NG/ML
CHLORIDE SERPL-SCNC: 101 MMOL/L
CO2 SERPL-SCNC: 24 MMOL/L
CREAT SERPL-MCNC: 0.88 MG/DL
GLUCOSE SERPL-MCNC: 97 MG/DL
POTASSIUM SERPL-SCNC: 5 MMOL/L
PROT SERPL-MCNC: 7.5 G/DL
SODIUM SERPL-SCNC: 139 MMOL/L

## 2018-05-07 ENCOUNTER — OUTPATIENT (OUTPATIENT)
Dept: OUTPATIENT SERVICES | Facility: HOSPITAL | Age: 53
LOS: 1 days | Discharge: HOME | End: 2018-05-07

## 2018-05-07 DIAGNOSIS — Z98.891 HISTORY OF UTERINE SCAR FROM PREVIOUS SURGERY: Chronic | ICD-10-CM

## 2018-05-07 DIAGNOSIS — Z90.49 ACQUIRED ABSENCE OF OTHER SPECIFIED PARTS OF DIGESTIVE TRACT: Chronic | ICD-10-CM

## 2018-05-07 DIAGNOSIS — C79.51 SECONDARY MALIGNANT NEOPLASM OF BONE: ICD-10-CM

## 2018-05-07 DIAGNOSIS — C50.911 MALIGNANT NEOPLASM OF UNSPECIFIED SITE OF RIGHT FEMALE BREAST: ICD-10-CM

## 2018-05-09 ENCOUNTER — APPOINTMENT (OUTPATIENT)
Dept: FAMILY MEDICINE | Facility: CLINIC | Age: 53
End: 2018-05-09
Payer: COMMERCIAL

## 2018-05-09 VITALS
HEIGHT: 66.93 IN | WEIGHT: 241 LBS | OXYGEN SATURATION: 95 % | DIASTOLIC BLOOD PRESSURE: 80 MMHG | SYSTOLIC BLOOD PRESSURE: 134 MMHG | HEART RATE: 91 BPM | BODY MASS INDEX: 37.83 KG/M2

## 2018-05-09 DIAGNOSIS — E53.8 DEFICIENCY OF OTHER SPECIFIED B GROUP VITAMINS: ICD-10-CM

## 2018-05-09 PROCEDURE — 36415 COLL VENOUS BLD VENIPUNCTURE: CPT

## 2018-05-09 PROCEDURE — 96127 BRIEF EMOTIONAL/BEHAV ASSMT: CPT

## 2018-05-09 PROCEDURE — 99386 PREV VISIT NEW AGE 40-64: CPT | Mod: 25

## 2018-05-09 RX ORDER — GABAPENTIN 300 MG/1
300 CAPSULE ORAL
Qty: 30 | Refills: 11 | Status: DISCONTINUED | COMMUNITY
Start: 2017-09-19 | End: 2018-05-09

## 2018-05-10 LAB
25(OH)D3 SERPL-MCNC: 25.6 NG/ML
ALBUMIN SERPL ELPH-MCNC: 4.5 G/DL
ALP BLD-CCNC: 64 U/L
ALT SERPL-CCNC: 23 U/L
ANION GAP SERPL CALC-SCNC: 17 MMOL/L
APPEARANCE: CLEAR
AST SERPL-CCNC: 19 U/L
BACTERIA: NEGATIVE
BASOPHILS # BLD AUTO: 0.06 K/UL
BASOPHILS NFR BLD AUTO: 1.8 %
BILIRUB SERPL-MCNC: 0.3 MG/DL
BILIRUBIN URINE: NEGATIVE
BLOOD URINE: NEGATIVE
BUN SERPL-MCNC: 16 MG/DL
CALCIUM SERPL-MCNC: 9.6 MG/DL
CHLORIDE SERPL-SCNC: 104 MMOL/L
CHOLEST SERPL-MCNC: 193 MG/DL
CHOLEST/HDLC SERPL: 2.5 RATIO
CO2 SERPL-SCNC: 21 MMOL/L
COLOR: YELLOW
CREAT SERPL-MCNC: 0.92 MG/DL
EOSINOPHIL # BLD AUTO: 0.03 K/UL
EOSINOPHIL NFR BLD AUTO: 0.9 %
GLUCOSE QUALITATIVE U: NEGATIVE MG/DL
GLUCOSE SERPL-MCNC: 102 MG/DL
HBA1C MFR BLD HPLC: 5.4 %
HCT VFR BLD CALC: 39.9 %
HCV AB SER QL: NONREACTIVE
HCV S/CO RATIO: 0.06 S/CO
HDLC SERPL-MCNC: 77 MG/DL
HGB BLD-MCNC: 13.9 G/DL
HYALINE CASTS: 5 /LPF
IMM GRANULOCYTES NFR BLD AUTO: 0 %
KETONES URINE: NEGATIVE
LDLC SERPL CALC-MCNC: 89 MG/DL
LEUKOCYTE ESTERASE URINE: ABNORMAL
LYMPHOCYTES # BLD AUTO: 0.99 K/UL
LYMPHOCYTES NFR BLD AUTO: 29.7 %
MAN DIFF?: NORMAL
MCHC RBC-ENTMCNC: 34.7 PG
MCHC RBC-ENTMCNC: 34.8 GM/DL
MCV RBC AUTO: 99.5 FL
MICROSCOPIC-UA: NORMAL
MONOCYTES # BLD AUTO: 0.16 K/UL
MONOCYTES NFR BLD AUTO: 4.8 %
NEUTROPHILS # BLD AUTO: 2.09 K/UL
NEUTROPHILS NFR BLD AUTO: 62.8 %
NITRITE URINE: NEGATIVE
PH URINE: 5
PLATELET # BLD AUTO: 327 K/UL
POTASSIUM SERPL-SCNC: 4.5 MMOL/L
PROT SERPL-MCNC: 7.5 G/DL
PROTEIN URINE: NEGATIVE MG/DL
RBC # BLD: 4.01 M/UL
RBC # FLD: 13.2 %
RED BLOOD CELLS URINE: 4 /HPF
SODIUM SERPL-SCNC: 142 MMOL/L
SPECIFIC GRAVITY URINE: 1.02
SQUAMOUS EPITHELIAL CELLS: 4 /HPF
TRIGL SERPL-MCNC: 135 MG/DL
TSH SERPL-ACNC: 2.76 UIU/ML
URATE SERPL-MCNC: 3.8 MG/DL
UROBILINOGEN URINE: NEGATIVE MG/DL
VIT B12 SERPL-MCNC: 397 PG/ML
WBC # FLD AUTO: 3.33 K/UL
WHITE BLOOD CELLS URINE: 7 /HPF

## 2018-05-11 ENCOUNTER — APPOINTMENT (OUTPATIENT)
Dept: HEMATOLOGY ONCOLOGY | Facility: CLINIC | Age: 53
End: 2018-05-11
Payer: COMMERCIAL

## 2018-05-11 ENCOUNTER — RESULT REVIEW (OUTPATIENT)
Age: 53
End: 2018-05-11

## 2018-05-11 VITALS
HEART RATE: 88 BPM | TEMPERATURE: 98.4 F | WEIGHT: 242.49 LBS | DIASTOLIC BLOOD PRESSURE: 80 MMHG | SYSTOLIC BLOOD PRESSURE: 120 MMHG | RESPIRATION RATE: 16 BRPM | OXYGEN SATURATION: 98 % | BODY MASS INDEX: 38.06 KG/M2

## 2018-05-11 DIAGNOSIS — Z13.6 ENCOUNTER FOR SCREENING FOR CARDIOVASCULAR DISORDERS: ICD-10-CM

## 2018-05-11 LAB
BASOPHILS # BLD AUTO: 0.1 K/UL — SIGNIFICANT CHANGE UP (ref 0–0.2)
BASOPHILS NFR BLD AUTO: 1.8 % — SIGNIFICANT CHANGE UP (ref 0–2)
EOSINOPHIL # BLD AUTO: 0.1 K/UL — SIGNIFICANT CHANGE UP (ref 0–0.5)
EOSINOPHIL NFR BLD AUTO: 1.7 % — SIGNIFICANT CHANGE UP (ref 0–6)
HCT VFR BLD CALC: 39.2 % — SIGNIFICANT CHANGE UP (ref 34.5–45)
HGB BLD-MCNC: 14 G/DL — SIGNIFICANT CHANGE UP (ref 11.5–15.5)
LYMPHOCYTES # BLD AUTO: 1 K/UL — SIGNIFICANT CHANGE UP (ref 1–3.3)
LYMPHOCYTES # BLD AUTO: 34 % — SIGNIFICANT CHANGE UP (ref 13–44)
MCHC RBC-ENTMCNC: 35.6 PG — HIGH (ref 27–34)
MCHC RBC-ENTMCNC: 35.7 G/DL — SIGNIFICANT CHANGE UP (ref 32–36)
MCV RBC AUTO: 99.8 FL — SIGNIFICANT CHANGE UP (ref 80–100)
MONOCYTES # BLD AUTO: 0.1 K/UL — SIGNIFICANT CHANGE UP (ref 0–0.9)
MONOCYTES NFR BLD AUTO: 3.3 % — SIGNIFICANT CHANGE UP (ref 2–14)
NEUTROPHILS # BLD AUTO: 1.8 K/UL — SIGNIFICANT CHANGE UP (ref 1.8–7.4)
NEUTROPHILS NFR BLD AUTO: 59.3 % — SIGNIFICANT CHANGE UP (ref 43–77)
PLATELET # BLD AUTO: 356 K/UL — SIGNIFICANT CHANGE UP (ref 150–400)
RBC # BLD: 3.93 M/UL — SIGNIFICANT CHANGE UP (ref 3.8–5.2)
RBC # FLD: 11.7 % — SIGNIFICANT CHANGE UP (ref 10.3–14.5)
WBC # BLD: 3 K/UL — LOW (ref 3.8–10.5)
WBC # FLD AUTO: 3 K/UL — LOW (ref 3.8–10.5)

## 2018-05-11 PROCEDURE — 99215 OFFICE O/P EST HI 40 MIN: CPT

## 2018-05-14 LAB
ALBUMIN SERPL ELPH-MCNC: 4.5 G/DL
ALP BLD-CCNC: 64 U/L
ALT SERPL-CCNC: 22 U/L
ANION GAP SERPL CALC-SCNC: 14 MMOL/L
AST SERPL-CCNC: 20 U/L
BILIRUB SERPL-MCNC: 0.5 MG/DL
BUN SERPL-MCNC: 15 MG/DL
CALCIUM SERPL-MCNC: 9.9 MG/DL
CANCER AG27-29 SERPL-ACNC: 13.3 U/ML
CEA SERPL-MCNC: 2.4 NG/ML
CHLORIDE SERPL-SCNC: 103 MMOL/L
CO2 SERPL-SCNC: 24 MMOL/L
CREAT SERPL-MCNC: 1.03 MG/DL
GLUCOSE SERPL-MCNC: 104 MG/DL
POTASSIUM SERPL-SCNC: 4.7 MMOL/L
PROT SERPL-MCNC: 7.4 G/DL
SODIUM SERPL-SCNC: 141 MMOL/L

## 2018-05-30 ENCOUNTER — APPOINTMENT (OUTPATIENT)
Dept: INFUSION THERAPY | Facility: HOSPITAL | Age: 53
End: 2018-05-30

## 2018-06-12 ENCOUNTER — APPOINTMENT (OUTPATIENT)
Dept: HEMATOLOGY ONCOLOGY | Facility: CLINIC | Age: 53
End: 2018-06-12

## 2018-07-10 ENCOUNTER — FORM ENCOUNTER (OUTPATIENT)
Age: 53
End: 2018-07-10

## 2018-07-10 ENCOUNTER — OUTPATIENT (OUTPATIENT)
Dept: OUTPATIENT SERVICES | Facility: HOSPITAL | Age: 53
LOS: 1 days | Discharge: HOME | End: 2018-07-10

## 2018-07-10 DIAGNOSIS — C79.51 SECONDARY MALIGNANT NEOPLASM OF BONE: ICD-10-CM

## 2018-07-10 DIAGNOSIS — C50.911 MALIGNANT NEOPLASM OF UNSPECIFIED SITE OF RIGHT FEMALE BREAST: ICD-10-CM

## 2018-07-10 DIAGNOSIS — Z90.49 ACQUIRED ABSENCE OF OTHER SPECIFIED PARTS OF DIGESTIVE TRACT: Chronic | ICD-10-CM

## 2018-07-10 DIAGNOSIS — Z98.891 HISTORY OF UTERINE SCAR FROM PREVIOUS SURGERY: Chronic | ICD-10-CM

## 2018-07-11 ENCOUNTER — APPOINTMENT (OUTPATIENT)
Dept: NUCLEAR MEDICINE | Facility: IMAGING CENTER | Age: 53
End: 2018-07-11
Payer: COMMERCIAL

## 2018-07-11 ENCOUNTER — APPOINTMENT (OUTPATIENT)
Dept: CT IMAGING | Facility: IMAGING CENTER | Age: 53
End: 2018-07-11
Payer: COMMERCIAL

## 2018-07-11 ENCOUNTER — OUTPATIENT (OUTPATIENT)
Dept: OUTPATIENT SERVICES | Facility: HOSPITAL | Age: 53
LOS: 1 days | End: 2018-07-11
Payer: COMMERCIAL

## 2018-07-11 DIAGNOSIS — C79.51 SECONDARY MALIGNANT NEOPLASM OF BONE: ICD-10-CM

## 2018-07-11 DIAGNOSIS — Z98.891 HISTORY OF UTERINE SCAR FROM PREVIOUS SURGERY: Chronic | ICD-10-CM

## 2018-07-11 DIAGNOSIS — Z90.49 ACQUIRED ABSENCE OF OTHER SPECIFIED PARTS OF DIGESTIVE TRACT: Chronic | ICD-10-CM

## 2018-07-11 DIAGNOSIS — C50.911 MALIGNANT NEOPLASM OF UNSPECIFIED SITE OF RIGHT FEMALE BREAST: ICD-10-CM

## 2018-07-11 PROCEDURE — 78306 BONE IMAGING WHOLE BODY: CPT | Mod: 26

## 2018-07-11 PROCEDURE — 71260 CT THORAX DX C+: CPT | Mod: 26

## 2018-07-11 PROCEDURE — 74177 CT ABD & PELVIS W/CONTRAST: CPT

## 2018-07-11 PROCEDURE — 78306 BONE IMAGING WHOLE BODY: CPT

## 2018-07-11 PROCEDURE — 78320: CPT | Mod: 26

## 2018-07-11 PROCEDURE — 78999 UNLISTED MISC PX DX NUC MED: CPT

## 2018-07-11 PROCEDURE — 71260 CT THORAX DX C+: CPT

## 2018-07-11 PROCEDURE — A9561: CPT

## 2018-07-11 PROCEDURE — 74177 CT ABD & PELVIS W/CONTRAST: CPT | Mod: 26

## 2018-07-13 ENCOUNTER — RESULT REVIEW (OUTPATIENT)
Age: 53
End: 2018-07-13

## 2018-07-13 ENCOUNTER — APPOINTMENT (OUTPATIENT)
Dept: HEMATOLOGY ONCOLOGY | Facility: CLINIC | Age: 53
End: 2018-07-13
Payer: COMMERCIAL

## 2018-07-13 VITALS
HEART RATE: 114 BPM | BODY MASS INDEX: 37.89 KG/M2 | RESPIRATION RATE: 17 BRPM | WEIGHT: 241.4 LBS | SYSTOLIC BLOOD PRESSURE: 118 MMHG | TEMPERATURE: 99.6 F | DIASTOLIC BLOOD PRESSURE: 85 MMHG | OXYGEN SATURATION: 98 %

## 2018-07-13 LAB
BASOPHILS # BLD AUTO: 0 K/UL — SIGNIFICANT CHANGE UP (ref 0–0.2)
BASOPHILS NFR BLD AUTO: 1.2 % — SIGNIFICANT CHANGE UP (ref 0–2)
EOSINOPHIL # BLD AUTO: 0.1 K/UL — SIGNIFICANT CHANGE UP (ref 0–0.5)
EOSINOPHIL NFR BLD AUTO: 2.9 % — SIGNIFICANT CHANGE UP (ref 0–6)
HCT VFR BLD CALC: 39.2 % — SIGNIFICANT CHANGE UP (ref 34.5–45)
HGB BLD-MCNC: 13.7 G/DL — SIGNIFICANT CHANGE UP (ref 11.5–15.5)
LYMPHOCYTES # BLD AUTO: 1.1 K/UL — SIGNIFICANT CHANGE UP (ref 1–3.3)
LYMPHOCYTES # BLD AUTO: 43.5 % — SIGNIFICANT CHANGE UP (ref 13–44)
MCHC RBC-ENTMCNC: 34.8 G/DL — SIGNIFICANT CHANGE UP (ref 32–36)
MCHC RBC-ENTMCNC: 34.8 PG — HIGH (ref 27–34)
MCV RBC AUTO: 100 FL — SIGNIFICANT CHANGE UP (ref 80–100)
MONOCYTES # BLD AUTO: 0.1 K/UL — SIGNIFICANT CHANGE UP (ref 0–0.9)
MONOCYTES NFR BLD AUTO: 3.6 % — SIGNIFICANT CHANGE UP (ref 2–14)
NEUTROPHILS # BLD AUTO: 1.2 K/UL — LOW (ref 1.8–7.4)
NEUTROPHILS NFR BLD AUTO: 48.9 % — SIGNIFICANT CHANGE UP (ref 43–77)
PLAT MORPH BLD: NORMAL — SIGNIFICANT CHANGE UP
PLATELET # BLD AUTO: 326 K/UL — SIGNIFICANT CHANGE UP (ref 150–400)
RBC # BLD: 3.92 M/UL — SIGNIFICANT CHANGE UP (ref 3.8–5.2)
RBC # FLD: 11.6 % — SIGNIFICANT CHANGE UP (ref 10.3–14.5)
RBC BLD AUTO: SIGNIFICANT CHANGE UP
WBC # BLD: 2.5 K/UL — LOW (ref 3.8–10.5)
WBC # FLD AUTO: 2.5 K/UL — LOW (ref 3.8–10.5)

## 2018-07-13 PROCEDURE — 99215 OFFICE O/P EST HI 40 MIN: CPT

## 2018-07-15 LAB
ALBUMIN SERPL ELPH-MCNC: 4.3 G/DL
ALP BLD-CCNC: 60 U/L
ALT SERPL-CCNC: 22 U/L
ANION GAP SERPL CALC-SCNC: 12 MMOL/L
AST SERPL-CCNC: 20 U/L
BILIRUB SERPL-MCNC: 0.4 MG/DL
BUN SERPL-MCNC: 18 MG/DL
CALCIUM SERPL-MCNC: 9.9 MG/DL
CANCER AG27-29 SERPL-ACNC: 12.7 U/ML
CEA SERPL-MCNC: 2.4 NG/ML
CHLORIDE SERPL-SCNC: 104 MMOL/L
CO2 SERPL-SCNC: 28 MMOL/L
CREAT SERPL-MCNC: 0.87 MG/DL
GLUCOSE SERPL-MCNC: 94 MG/DL
POTASSIUM SERPL-SCNC: 4.7 MMOL/L
PROT SERPL-MCNC: 6.8 G/DL
SODIUM SERPL-SCNC: 144 MMOL/L

## 2018-07-16 PROBLEM — C50.919 MALIGNANT NEOPLASM OF UNSPECIFIED SITE OF UNSPECIFIED FEMALE BREAST: Chronic | Status: ACTIVE | Noted: 2017-07-11

## 2018-08-09 ENCOUNTER — RX RENEWAL (OUTPATIENT)
Age: 53
End: 2018-08-09

## 2018-08-10 ENCOUNTER — APPOINTMENT (OUTPATIENT)
Dept: HEMATOLOGY ONCOLOGY | Facility: CLINIC | Age: 53
End: 2018-08-10

## 2018-08-20 ENCOUNTER — RX RENEWAL (OUTPATIENT)
Age: 53
End: 2018-08-20

## 2018-09-10 ENCOUNTER — OUTPATIENT (OUTPATIENT)
Dept: OUTPATIENT SERVICES | Facility: HOSPITAL | Age: 53
LOS: 1 days | Discharge: HOME | End: 2018-09-10

## 2018-09-10 DIAGNOSIS — Z90.49 ACQUIRED ABSENCE OF OTHER SPECIFIED PARTS OF DIGESTIVE TRACT: Chronic | ICD-10-CM

## 2018-09-10 DIAGNOSIS — Z98.891 HISTORY OF UTERINE SCAR FROM PREVIOUS SURGERY: Chronic | ICD-10-CM

## 2018-09-10 DIAGNOSIS — C50.911 MALIGNANT NEOPLASM OF UNSPECIFIED SITE OF RIGHT FEMALE BREAST: ICD-10-CM

## 2018-09-14 ENCOUNTER — RESULT REVIEW (OUTPATIENT)
Age: 53
End: 2018-09-14

## 2018-09-14 ENCOUNTER — APPOINTMENT (OUTPATIENT)
Dept: HEMATOLOGY ONCOLOGY | Facility: CLINIC | Age: 53
End: 2018-09-14
Payer: COMMERCIAL

## 2018-09-14 VITALS
BODY MASS INDEX: 38.07 KG/M2 | RESPIRATION RATE: 17 BRPM | DIASTOLIC BLOOD PRESSURE: 79 MMHG | OXYGEN SATURATION: 98 % | TEMPERATURE: 99.3 F | WEIGHT: 242.57 LBS | SYSTOLIC BLOOD PRESSURE: 133 MMHG | HEART RATE: 83 BPM

## 2018-09-14 DIAGNOSIS — Z81.8 FAMILY HISTORY OF OTHER MENTAL AND BEHAVIORAL DISORDERS: ICD-10-CM

## 2018-09-14 LAB
ALBUMIN SERPL ELPH-MCNC: 4.5 G/DL
ALP BLD-CCNC: 58 U/L
ALT SERPL-CCNC: 20 U/L
ANION GAP SERPL CALC-SCNC: 12 MMOL/L
AST SERPL-CCNC: 21 U/L
BASOPHILS # BLD AUTO: 0 K/UL — SIGNIFICANT CHANGE UP (ref 0–0.2)
BASOPHILS NFR BLD AUTO: 1.2 % — SIGNIFICANT CHANGE UP (ref 0–2)
BILIRUB SERPL-MCNC: 0.4 MG/DL
BUN SERPL-MCNC: 19 MG/DL
CALCIUM SERPL-MCNC: 9.5 MG/DL
CEA SERPL-MCNC: 2.4 NG/ML
CHLORIDE SERPL-SCNC: 105 MMOL/L
CO2 SERPL-SCNC: 27 MMOL/L
CREAT SERPL-MCNC: 0.88 MG/DL
EOSINOPHIL # BLD AUTO: 0.1 K/UL — SIGNIFICANT CHANGE UP (ref 0–0.5)
EOSINOPHIL NFR BLD AUTO: 2.3 % — SIGNIFICANT CHANGE UP (ref 0–6)
GLUCOSE SERPL-MCNC: 89 MG/DL
HCT VFR BLD CALC: 38 % — SIGNIFICANT CHANGE UP (ref 34.5–45)
HGB BLD-MCNC: 13.3 G/DL — SIGNIFICANT CHANGE UP (ref 11.5–15.5)
LYMPHOCYTES # BLD AUTO: 1.3 K/UL — SIGNIFICANT CHANGE UP (ref 1–3.3)
LYMPHOCYTES # BLD AUTO: 44.4 % — HIGH (ref 13–44)
MCHC RBC-ENTMCNC: 34.9 G/DL — SIGNIFICANT CHANGE UP (ref 32–36)
MCHC RBC-ENTMCNC: 35.3 PG — HIGH (ref 27–34)
MCV RBC AUTO: 101 FL — HIGH (ref 80–100)
MONOCYTES # BLD AUTO: 0.2 K/UL — SIGNIFICANT CHANGE UP (ref 0–0.9)
MONOCYTES NFR BLD AUTO: 6.3 % — SIGNIFICANT CHANGE UP (ref 2–14)
NEUTROPHILS # BLD AUTO: 1.3 K/UL — LOW (ref 1.8–7.4)
NEUTROPHILS NFR BLD AUTO: 45.9 % — SIGNIFICANT CHANGE UP (ref 43–77)
PLATELET # BLD AUTO: 217 K/UL — SIGNIFICANT CHANGE UP (ref 150–400)
POTASSIUM SERPL-SCNC: 4.6 MMOL/L
PROT SERPL-MCNC: 6.6 G/DL
RBC # BLD: 3.77 M/UL — LOW (ref 3.8–5.2)
RBC # FLD: 11.9 % — SIGNIFICANT CHANGE UP (ref 10.3–14.5)
SODIUM SERPL-SCNC: 144 MMOL/L
WBC # BLD: 2.9 K/UL — LOW (ref 3.8–10.5)
WBC # FLD AUTO: 2.9 K/UL — LOW (ref 3.8–10.5)

## 2018-09-14 PROCEDURE — 99215 OFFICE O/P EST HI 40 MIN: CPT

## 2018-09-14 RX ORDER — CALCIUM CARBONATE/VITAMIN D3 500-10/5ML
400 LIQUID (ML) ORAL
Refills: 0 | Status: ACTIVE | COMMUNITY
Start: 2018-09-14

## 2018-09-14 RX ORDER — IBUPROFEN 200 MG
600 CAPSULE ORAL
Refills: 0 | Status: ACTIVE | COMMUNITY
Start: 2018-09-14

## 2018-09-17 LAB — CANCER AG27-29 SERPL-ACNC: 11.7 U/ML

## 2018-10-12 ENCOUNTER — APPOINTMENT (OUTPATIENT)
Dept: HEMATOLOGY ONCOLOGY | Facility: CLINIC | Age: 53
End: 2018-10-12

## 2018-10-31 ENCOUNTER — OUTPATIENT (OUTPATIENT)
Dept: OUTPATIENT SERVICES | Facility: HOSPITAL | Age: 53
LOS: 1 days | Discharge: HOME | End: 2018-10-31

## 2018-10-31 DIAGNOSIS — C50.911 MALIGNANT NEOPLASM OF UNSPECIFIED SITE OF RIGHT FEMALE BREAST: ICD-10-CM

## 2018-10-31 DIAGNOSIS — Z98.891 HISTORY OF UTERINE SCAR FROM PREVIOUS SURGERY: Chronic | ICD-10-CM

## 2018-10-31 DIAGNOSIS — C79.51 SECONDARY MALIGNANT NEOPLASM OF BONE: ICD-10-CM

## 2018-10-31 DIAGNOSIS — Z90.49 ACQUIRED ABSENCE OF OTHER SPECIFIED PARTS OF DIGESTIVE TRACT: Chronic | ICD-10-CM

## 2018-11-01 ENCOUNTER — FORM ENCOUNTER (OUTPATIENT)
Age: 53
End: 2018-11-01

## 2018-11-02 ENCOUNTER — APPOINTMENT (OUTPATIENT)
Dept: CT IMAGING | Facility: CLINIC | Age: 53
End: 2018-11-02
Payer: COMMERCIAL

## 2018-11-02 ENCOUNTER — OUTPATIENT (OUTPATIENT)
Dept: OUTPATIENT SERVICES | Facility: HOSPITAL | Age: 53
LOS: 1 days | End: 2018-11-02
Payer: COMMERCIAL

## 2018-11-02 DIAGNOSIS — Z90.49 ACQUIRED ABSENCE OF OTHER SPECIFIED PARTS OF DIGESTIVE TRACT: Chronic | ICD-10-CM

## 2018-11-02 DIAGNOSIS — Z98.891 HISTORY OF UTERINE SCAR FROM PREVIOUS SURGERY: Chronic | ICD-10-CM

## 2018-11-02 DIAGNOSIS — Z00.8 ENCOUNTER FOR OTHER GENERAL EXAMINATION: ICD-10-CM

## 2018-11-02 PROCEDURE — 71260 CT THORAX DX C+: CPT

## 2018-11-02 PROCEDURE — 74177 CT ABD & PELVIS W/CONTRAST: CPT

## 2018-11-02 PROCEDURE — 74177 CT ABD & PELVIS W/CONTRAST: CPT | Mod: 26

## 2018-11-02 PROCEDURE — 71260 CT THORAX DX C+: CPT | Mod: 26

## 2018-11-08 NOTE — OB HISTORY
[Definite:  ___ (Date)] : the last menstrual period was [unfilled] [Menarche Age: ____] : age at menarche was [unfilled] [___] : Living: [unfilled]

## 2018-11-08 NOTE — ASSESSMENT
[Palliative] : Goals of care discussed with patient: Palliative [Palliative Care Plan] : not applicable at this time

## 2018-11-09 ENCOUNTER — APPOINTMENT (OUTPATIENT)
Dept: HEMATOLOGY ONCOLOGY | Facility: CLINIC | Age: 53
End: 2018-11-09
Payer: COMMERCIAL

## 2018-11-09 ENCOUNTER — APPOINTMENT (OUTPATIENT)
Dept: INFUSION THERAPY | Facility: HOSPITAL | Age: 53
End: 2018-11-09
Payer: COMMERCIAL

## 2018-11-09 ENCOUNTER — RESULT REVIEW (OUTPATIENT)
Age: 53
End: 2018-11-09

## 2018-11-09 VITALS
HEART RATE: 103 BPM | TEMPERATURE: 98 F | RESPIRATION RATE: 17 BRPM | DIASTOLIC BLOOD PRESSURE: 81 MMHG | SYSTOLIC BLOOD PRESSURE: 131 MMHG | BODY MASS INDEX: 37.82 KG/M2 | WEIGHT: 240.96 LBS | OXYGEN SATURATION: 97 %

## 2018-11-09 LAB
ALBUMIN SERPL ELPH-MCNC: 4.2 G/DL
ALP BLD-CCNC: 61 U/L
ALT SERPL-CCNC: 17 U/L
ANION GAP SERPL CALC-SCNC: 11 MMOL/L
AST SERPL-CCNC: 17 U/L
BASOPHILS # BLD AUTO: 0 K/UL — SIGNIFICANT CHANGE UP (ref 0–0.2)
BASOPHILS NFR BLD AUTO: 1.5 % — SIGNIFICANT CHANGE UP (ref 0–2)
BILIRUB SERPL-MCNC: 0.3 MG/DL
BUN SERPL-MCNC: 16 MG/DL
CALCIUM SERPL-MCNC: 9.6 MG/DL
CHLORIDE SERPL-SCNC: 103 MMOL/L
CO2 SERPL-SCNC: 27 MMOL/L
CREAT SERPL-MCNC: 0.99 MG/DL
EOSINOPHIL # BLD AUTO: 0.1 K/UL — SIGNIFICANT CHANGE UP (ref 0–0.5)
EOSINOPHIL NFR BLD AUTO: 2.5 % — SIGNIFICANT CHANGE UP (ref 0–6)
GLUCOSE SERPL-MCNC: 88 MG/DL
HCT VFR BLD CALC: 39.5 % — SIGNIFICANT CHANGE UP (ref 34.5–45)
HGB BLD-MCNC: 13.4 G/DL — SIGNIFICANT CHANGE UP (ref 11.5–15.5)
LYMPHOCYTES # BLD AUTO: 1.1 K/UL — SIGNIFICANT CHANGE UP (ref 1–3.3)
LYMPHOCYTES # BLD AUTO: 37.3 % — SIGNIFICANT CHANGE UP (ref 13–44)
MCHC RBC-ENTMCNC: 34 G/DL — SIGNIFICANT CHANGE UP (ref 32–36)
MCHC RBC-ENTMCNC: 34.2 PG — HIGH (ref 27–34)
MCV RBC AUTO: 101 FL — HIGH (ref 80–100)
MONOCYTES # BLD AUTO: 0.2 K/UL — SIGNIFICANT CHANGE UP (ref 0–0.9)
MONOCYTES NFR BLD AUTO: 8 % — SIGNIFICANT CHANGE UP (ref 2–14)
NEUTROPHILS # BLD AUTO: 1.4 K/UL — LOW (ref 1.8–7.4)
NEUTROPHILS NFR BLD AUTO: 50.6 % — SIGNIFICANT CHANGE UP (ref 43–77)
PLATELET # BLD AUTO: 238 K/UL — SIGNIFICANT CHANGE UP (ref 150–400)
POTASSIUM SERPL-SCNC: 4.6 MMOL/L
PROT SERPL-MCNC: 6.8 G/DL
RBC # BLD: 3.92 M/UL — SIGNIFICANT CHANGE UP (ref 3.8–5.2)
RBC # FLD: 11.7 % — SIGNIFICANT CHANGE UP (ref 10.3–14.5)
SODIUM SERPL-SCNC: 141 MMOL/L
WBC # BLD: 2.9 K/UL — LOW (ref 3.8–10.5)
WBC # FLD AUTO: 2.9 K/UL — LOW (ref 3.8–10.5)

## 2018-11-09 PROCEDURE — 99215 OFFICE O/P EST HI 40 MIN: CPT | Mod: 25

## 2018-11-09 PROCEDURE — G0008: CPT

## 2018-11-09 NOTE — HISTORY OF PRESENT ILLNESS
[Disease: _____________________] : Disease: [unfilled] [T: ___] : T[unfilled] [N: ___] : N[unfilled] [M: ___] : M[unfilled] [AJCC Stage: ____] : AJCC Stage: [unfilled] [de-identified] : Right breast cancer diagnosed at the age of 52\par 11/14 Presented with right breast mastitis and treated with antibiotics\par Multiple recurrence of mastitis over next two years, 3-4 per year with increased frequency in 2016-17\par 6/13/16 She had a 3 mm punch biopsy of the right breast which revealed interstitial neutrophilic infiltrate\par 04/25/17 Bilateral breast mammogram showed no significant interval change in the appearance of the diffuse breast edema and diffuse skin thickening in the right breast with no specific evidence of adenopathy or underlying abnormality. New skin thickening and increase parenchymal density in the left LIQ. Finding are indeterminate and biopsy was suggested. Bilateral breast ultrasound showed diffuse right sided skin thickening with focal left medial new skin thickening.\par 06/02/17 Diagnostic mammogram revealed signs of breast edema with skin thickening, progressive since prior studies. D/DX cellulitis vs. mastitis vs. inflammatory breast cancer.\par 06/03/17 Bilateral breast ultrasound again showed nonspecific and recurrent signs of inflammation in the right breast, consisting of skin thickening and breast edema. Patient reported having pain, erythema, fever and shaking chills, slightly better with antibiotics.\par 06/22/17 Right central breast, sonoguided core biopsy showed  invasive ductal carcinoma, SBR 6/9, ER 95%, OH 30% and HER 2 negative and DCIS solid pattern with intermediate grade nuclear atypia.\par 06/26/17 MRI of the breast showed a recently diagnosed right breast malignancy, with diffuse skin thickening and asymmetric parenchymal enhancement. A 5 mm enhancing nodule  in the right upper outer breast, possibly representing intramammary lymph node. No suspicious findings in the left breast.\par 06/27/17 CT scan of C/A/P showed an irregular right sided breast thickening compatible with biopsy proven right breast cancer. Osseous metastases involving the T10, T11 and L4 vertebral bodies. Indeterminate 2 cm left adrenal nodule.\par 06/27/17 Bone scan showed abnormal bone scan compatible with multifocal osseous metastases in the thoracolumbar spine, left iliac bone and in the right acetabulum.\par 7/14/17 BSO \par 7/21/17 Started letrozole and Ibrance  [de-identified] : Invasive ductal carcinoma, ER 95%, AR 30% and HER 2 negative [de-identified] : Nasrin continues to do well on letrozole and Ibrance. She has stable arthralgias which improve when she is active.  She does note more pain in her feet, especially if she is on her feet for a long time.  She takes Advil as needed which helps.\par Her mood and sexual function have improved with the Wellbutrin.\par Hot flashes have decreased since starting the gabapentin at night. She notes some muscle cramping immediately prior to getting hot flashes when they occur in the day.\par Occasional sores on the tongue but they resolve quickly when she uses the Biotene.\par Her hair continues to thin even on the Biotin.\par No recurrence of cellulitis since the episode in her left breast in 8/17.\par \par CT scan from 11/2/18: Stable with no new findings\par CT scan from 7/11/18: Stable with no new findings\par Bone scan 7/11/18 stable to improved with no new areas of disease\par CT scan from 4/9/18: Stable osseous metastases and no new findings.\par CT scan from 1/16/18: Stable osseous metastases. No radiographic evidence of visceral metastasis with a stable 2 cm indeterminate left adrenal nodule\par \par \par \par \par \par \par \par

## 2018-11-09 NOTE — PHYSICAL EXAM
[Fully active, able to carry on all pre-disease performance without restriction] : Status 0 - Fully active, able to carry on all pre-disease performance without restriction [Normal] : affect appropriate [de-identified] : Right breast with decreased nipple retraction and tumor measuring ~ 8 x 8 cm; more normal breast tissue noted around the breast mass with mild edema

## 2018-11-12 LAB
CANCER AG27-29 SERPL-ACNC: 14.1 U/ML
CEA SERPL-MCNC: 2 NG/ML

## 2019-01-07 ENCOUNTER — RX RENEWAL (OUTPATIENT)
Age: 54
End: 2019-01-07

## 2019-01-11 ENCOUNTER — OUTPATIENT (OUTPATIENT)
Dept: OUTPATIENT SERVICES | Facility: HOSPITAL | Age: 54
LOS: 1 days | Discharge: HOME | End: 2019-01-11

## 2019-01-11 DIAGNOSIS — C79.51 SECONDARY MALIGNANT NEOPLASM OF BONE: ICD-10-CM

## 2019-01-11 DIAGNOSIS — C50.911 MALIGNANT NEOPLASM OF UNSPECIFIED SITE OF RIGHT FEMALE BREAST: ICD-10-CM

## 2019-01-11 DIAGNOSIS — Z98.891 HISTORY OF UTERINE SCAR FROM PREVIOUS SURGERY: Chronic | ICD-10-CM

## 2019-01-11 DIAGNOSIS — Z90.49 ACQUIRED ABSENCE OF OTHER SPECIFIED PARTS OF DIGESTIVE TRACT: Chronic | ICD-10-CM

## 2019-01-15 ENCOUNTER — RESULT REVIEW (OUTPATIENT)
Age: 54
End: 2019-01-15

## 2019-01-15 ENCOUNTER — APPOINTMENT (OUTPATIENT)
Dept: HEMATOLOGY ONCOLOGY | Facility: CLINIC | Age: 54
End: 2019-01-15
Payer: COMMERCIAL

## 2019-01-15 VITALS
TEMPERATURE: 98.6 F | WEIGHT: 245.37 LBS | RESPIRATION RATE: 17 BRPM | SYSTOLIC BLOOD PRESSURE: 130 MMHG | OXYGEN SATURATION: 96 % | HEART RATE: 107 BPM | BODY MASS INDEX: 38.51 KG/M2 | DIASTOLIC BLOOD PRESSURE: 80 MMHG

## 2019-01-15 LAB
BASOPHILS # BLD AUTO: 0.1 K/UL — SIGNIFICANT CHANGE UP (ref 0–0.2)
BASOPHILS NFR BLD AUTO: 1.8 % — SIGNIFICANT CHANGE UP (ref 0–2)
EOSINOPHIL # BLD AUTO: 0.1 K/UL — SIGNIFICANT CHANGE UP (ref 0–0.5)
EOSINOPHIL NFR BLD AUTO: 1.9 % — SIGNIFICANT CHANGE UP (ref 0–6)
HCT VFR BLD CALC: 39.8 % — SIGNIFICANT CHANGE UP (ref 34.5–45)
HGB BLD-MCNC: 13.7 G/DL — SIGNIFICANT CHANGE UP (ref 11.5–15.5)
LYMPHOCYTES # BLD AUTO: 1.2 K/UL — SIGNIFICANT CHANGE UP (ref 1–3.3)
LYMPHOCYTES # BLD AUTO: 32.1 % — SIGNIFICANT CHANGE UP (ref 13–44)
MCHC RBC-ENTMCNC: 34.2 PG — HIGH (ref 27–34)
MCHC RBC-ENTMCNC: 34.5 G/DL — SIGNIFICANT CHANGE UP (ref 32–36)
MCV RBC AUTO: 99 FL — SIGNIFICANT CHANGE UP (ref 80–100)
MONOCYTES # BLD AUTO: 0.2 K/UL — SIGNIFICANT CHANGE UP (ref 0–0.9)
MONOCYTES NFR BLD AUTO: 6.3 % — SIGNIFICANT CHANGE UP (ref 2–14)
NEUTROPHILS # BLD AUTO: 2.1 K/UL — SIGNIFICANT CHANGE UP (ref 1.8–7.4)
NEUTROPHILS NFR BLD AUTO: 57.8 % — SIGNIFICANT CHANGE UP (ref 43–77)
PLATELET # BLD AUTO: 208 K/UL — SIGNIFICANT CHANGE UP (ref 150–400)
RBC # BLD: 4.02 M/UL — SIGNIFICANT CHANGE UP (ref 3.8–5.2)
RBC # FLD: 11.6 % — SIGNIFICANT CHANGE UP (ref 10.3–14.5)
WBC # BLD: 3.7 K/UL — LOW (ref 3.8–10.5)
WBC # FLD AUTO: 3.7 K/UL — LOW (ref 3.8–10.5)

## 2019-01-15 PROCEDURE — 99215 OFFICE O/P EST HI 40 MIN: CPT

## 2019-01-15 NOTE — PHYSICAL EXAM
[Fully active, able to carry on all pre-disease performance without restriction] : Status 0 - Fully active, able to carry on all pre-disease performance without restriction [Normal] : affect appropriate [de-identified] : Right breast with decreased nipple retraction and tumor measuring ~ 9 x 9 cm

## 2019-01-15 NOTE — HISTORY OF PRESENT ILLNESS
[Disease: _____________________] : Disease: [unfilled] [T: ___] : T[unfilled] [N: ___] : N[unfilled] [M: ___] : M[unfilled] [AJCC Stage: ____] : AJCC Stage: [unfilled] [de-identified] : Right breast cancer diagnosed at the age of 52\par 11/14 Presented with right breast mastitis and treated with antibiotics\par Multiple recurrence of mastitis over next two years, 3-4 per year with increased frequency in 2016-17\par 6/13/16 She had a 3 mm punch biopsy of the right breast which revealed interstitial neutrophilic infiltrate\par 04/25/17 Bilateral breast mammogram showed no significant interval change in the appearance of the diffuse breast edema and diffuse skin thickening in the right breast with no specific evidence of adenopathy or underlying abnormality. New skin thickening and increase parenchymal density in the left LIQ. Finding are indeterminate and biopsy was suggested. Bilateral breast ultrasound showed diffuse right sided skin thickening with focal left medial new skin thickening.\par 06/02/17 Diagnostic mammogram revealed signs of breast edema with skin thickening, progressive since prior studies. D/DX cellulitis vs. mastitis vs. inflammatory breast cancer.\par 06/03/17 Bilateral breast ultrasound again showed nonspecific and recurrent signs of inflammation in the right breast, consisting of skin thickening and breast edema. Patient reported having pain, erythema, fever and shaking chills, slightly better with antibiotics.\par 06/22/17 Right central breast, sonoguided core biopsy showed  invasive ductal carcinoma, SBR 6/9, ER 95%, MT 30% and HER 2 negative and DCIS solid pattern with intermediate grade nuclear atypia.\par 06/26/17 MRI of the breast showed a recently diagnosed right breast malignancy, with diffuse skin thickening and asymmetric parenchymal enhancement. A 5 mm enhancing nodule  in the right upper outer breast, possibly representing intramammary lymph node. No suspicious findings in the left breast.\par 06/27/17 CT scan of C/A/P showed an irregular right sided breast thickening compatible with biopsy proven right breast cancer. Osseous metastases involving the T10, T11 and L4 vertebral bodies. Indeterminate 2 cm left adrenal nodule.\par 06/27/17 Bone scan showed abnormal bone scan compatible with multifocal osseous metastases in the thoracolumbar spine, left iliac bone and in the right acetabulum.\par 7/14/17 BSO \par 7/21/17 Started letrozole and Ibrance  [de-identified] : Invasive ductal carcinoma, ER 95%, MD 30% and HER 2 negative [de-identified] : Nasrin continues to do well on letrozole and Ibrance since 7/17, although she has been noticing that she has been increasingly stiff and achy.  She has read on line that exercise  can help and others have recommended Claritin. She will be starting a class, St[employer] which is offered to patients with breast cancer and will try the Claritin. Others have also found an improvement with Femara instead of letrozole so she can try this as well.\par Overall she is doing well otherwise with stable hot flashes since taking the gabapentin at night.\par Occasional sores on the tongue but they resolve quickly when she uses the Biotene.\par No recurrence of cellulitis since the episode in her left breast in 8/17.\par Her daughters are home from college and she is enjoying having them here.\par \par CT scan from 11/2/18: Stable with no new findings\par CT scan from 7/11/18: Stable with no new findings\par Bone scan 7/11/18 stable to improved with no new areas of disease\par CT scan from 4/9/18: Stable osseous metastases and no new findings.\par CT scan from 1/16/18: Stable osseous metastases. No radiographic evidence of visceral metastasis with a stable 2 cm indeterminate left adrenal nodule\par \par \par \par \par \par \par \par

## 2019-01-16 LAB
ALBUMIN SERPL ELPH-MCNC: 4.3 G/DL
ALP BLD-CCNC: 63 U/L
ALT SERPL-CCNC: 27 U/L
ANION GAP SERPL CALC-SCNC: 11 MMOL/L
AST SERPL-CCNC: 15 U/L
BILIRUB SERPL-MCNC: 0.2 MG/DL
BUN SERPL-MCNC: 19 MG/DL
CALCIUM SERPL-MCNC: 9.4 MG/DL
CANCER AG27-29 SERPL-ACNC: 14.2 U/ML
CEA SERPL-MCNC: 2.3 NG/ML
CHLORIDE SERPL-SCNC: 105 MMOL/L
CO2 SERPL-SCNC: 25 MMOL/L
CREAT SERPL-MCNC: 0.94 MG/DL
GLUCOSE SERPL-MCNC: 79 MG/DL
POTASSIUM SERPL-SCNC: 4.2 MMOL/L
PROT SERPL-MCNC: 6.6 G/DL
SODIUM SERPL-SCNC: 141 MMOL/L

## 2019-01-22 ENCOUNTER — RX RENEWAL (OUTPATIENT)
Age: 54
End: 2019-01-22

## 2019-02-15 ENCOUNTER — RX RENEWAL (OUTPATIENT)
Age: 54
End: 2019-02-15

## 2019-03-13 ENCOUNTER — OUTPATIENT (OUTPATIENT)
Dept: OUTPATIENT SERVICES | Facility: HOSPITAL | Age: 54
LOS: 1 days | Discharge: HOME | End: 2019-03-13

## 2019-03-13 DIAGNOSIS — Z90.49 ACQUIRED ABSENCE OF OTHER SPECIFIED PARTS OF DIGESTIVE TRACT: Chronic | ICD-10-CM

## 2019-03-13 DIAGNOSIS — Z98.891 HISTORY OF UTERINE SCAR FROM PREVIOUS SURGERY: Chronic | ICD-10-CM

## 2019-03-13 DIAGNOSIS — C50.911 MALIGNANT NEOPLASM OF UNSPECIFIED SITE OF RIGHT FEMALE BREAST: ICD-10-CM

## 2019-03-13 DIAGNOSIS — C79.51 SECONDARY MALIGNANT NEOPLASM OF BONE: ICD-10-CM

## 2019-03-14 ENCOUNTER — FORM ENCOUNTER (OUTPATIENT)
Age: 54
End: 2019-03-14

## 2019-03-14 ENCOUNTER — TRANSCRIPTION ENCOUNTER (OUTPATIENT)
Age: 54
End: 2019-03-14

## 2019-03-15 ENCOUNTER — OUTPATIENT (OUTPATIENT)
Dept: OUTPATIENT SERVICES | Facility: HOSPITAL | Age: 54
LOS: 1 days | End: 2019-03-15
Payer: COMMERCIAL

## 2019-03-15 ENCOUNTER — APPOINTMENT (OUTPATIENT)
Dept: CT IMAGING | Facility: CLINIC | Age: 54
End: 2019-03-15
Payer: COMMERCIAL

## 2019-03-15 DIAGNOSIS — Z00.00 ENCOUNTER FOR GENERAL ADULT MEDICAL EXAMINATION WITHOUT ABNORMAL FINDINGS: ICD-10-CM

## 2019-03-15 DIAGNOSIS — Z00.8 ENCOUNTER FOR OTHER GENERAL EXAMINATION: ICD-10-CM

## 2019-03-15 DIAGNOSIS — Z90.49 ACQUIRED ABSENCE OF OTHER SPECIFIED PARTS OF DIGESTIVE TRACT: Chronic | ICD-10-CM

## 2019-03-15 DIAGNOSIS — Z98.891 HISTORY OF UTERINE SCAR FROM PREVIOUS SURGERY: Chronic | ICD-10-CM

## 2019-03-15 DIAGNOSIS — C50.911 MALIGNANT NEOPLASM OF UNSPECIFIED SITE OF RIGHT FEMALE BREAST: ICD-10-CM

## 2019-03-15 PROCEDURE — 71260 CT THORAX DX C+: CPT

## 2019-03-15 PROCEDURE — 74177 CT ABD & PELVIS W/CONTRAST: CPT

## 2019-03-15 PROCEDURE — 74177 CT ABD & PELVIS W/CONTRAST: CPT | Mod: 26

## 2019-03-15 PROCEDURE — 71260 CT THORAX DX C+: CPT | Mod: 26

## 2019-03-19 ENCOUNTER — APPOINTMENT (OUTPATIENT)
Dept: HEMATOLOGY ONCOLOGY | Facility: CLINIC | Age: 54
End: 2019-03-19
Payer: COMMERCIAL

## 2019-03-19 ENCOUNTER — RESULT REVIEW (OUTPATIENT)
Age: 54
End: 2019-03-19

## 2019-03-19 VITALS
WEIGHT: 246.9 LBS | BODY MASS INDEX: 38.75 KG/M2 | DIASTOLIC BLOOD PRESSURE: 85 MMHG | TEMPERATURE: 99.1 F | OXYGEN SATURATION: 98 % | HEART RATE: 127 BPM | RESPIRATION RATE: 16 BRPM | SYSTOLIC BLOOD PRESSURE: 138 MMHG

## 2019-03-19 LAB
BASOPHILS # BLD AUTO: 0 K/UL — SIGNIFICANT CHANGE UP (ref 0–0.2)
BASOPHILS NFR BLD AUTO: 1.5 % — SIGNIFICANT CHANGE UP (ref 0–2)
EOSINOPHIL # BLD AUTO: 0 K/UL — SIGNIFICANT CHANGE UP (ref 0–0.5)
EOSINOPHIL NFR BLD AUTO: 1.4 % — SIGNIFICANT CHANGE UP (ref 0–6)
HCT VFR BLD CALC: 39.1 % — SIGNIFICANT CHANGE UP (ref 34.5–45)
HGB BLD-MCNC: 14.1 G/DL — SIGNIFICANT CHANGE UP (ref 11.5–15.5)
LYMPHOCYTES # BLD AUTO: 1 K/UL — SIGNIFICANT CHANGE UP (ref 1–3.3)
LYMPHOCYTES # BLD AUTO: 32.3 % — SIGNIFICANT CHANGE UP (ref 13–44)
MCHC RBC-ENTMCNC: 35.6 PG — HIGH (ref 27–34)
MCHC RBC-ENTMCNC: 36.1 G/DL — HIGH (ref 32–36)
MCV RBC AUTO: 98.5 FL — SIGNIFICANT CHANGE UP (ref 80–100)
MONOCYTES # BLD AUTO: 0.1 K/UL — SIGNIFICANT CHANGE UP (ref 0–0.9)
MONOCYTES NFR BLD AUTO: 3.8 % — SIGNIFICANT CHANGE UP (ref 2–14)
NEUTROPHILS # BLD AUTO: 1.9 K/UL — SIGNIFICANT CHANGE UP (ref 1.8–7.4)
NEUTROPHILS NFR BLD AUTO: 61 % — SIGNIFICANT CHANGE UP (ref 43–77)
PLATELET # BLD AUTO: 308 K/UL — SIGNIFICANT CHANGE UP (ref 150–400)
RBC # BLD: 3.97 M/UL — SIGNIFICANT CHANGE UP (ref 3.8–5.2)
RBC # FLD: 11.6 % — SIGNIFICANT CHANGE UP (ref 10.3–14.5)
WBC # BLD: 3.2 K/UL — LOW (ref 3.8–10.5)
WBC # FLD AUTO: 3.2 K/UL — LOW (ref 3.8–10.5)

## 2019-03-19 PROCEDURE — 99215 OFFICE O/P EST HI 40 MIN: CPT

## 2019-03-20 LAB
ALBUMIN SERPL ELPH-MCNC: 4.6 G/DL
ALP BLD-CCNC: 57 U/L
ALT SERPL-CCNC: 20 U/L
ANION GAP SERPL CALC-SCNC: 12 MMOL/L
AST SERPL-CCNC: 17 U/L
BILIRUB SERPL-MCNC: 0.4 MG/DL
BUN SERPL-MCNC: 17 MG/DL
CALCIUM SERPL-MCNC: 9.6 MG/DL
CEA SERPL-MCNC: 2 NG/ML
CHLORIDE SERPL-SCNC: 105 MMOL/L
CO2 SERPL-SCNC: 25 MMOL/L
CREAT SERPL-MCNC: 1.01 MG/DL
GLUCOSE SERPL-MCNC: 70 MG/DL
POTASSIUM SERPL-SCNC: 4 MMOL/L
PROT SERPL-MCNC: 7 G/DL
SODIUM SERPL-SCNC: 142 MMOL/L

## 2019-03-21 LAB — CANCER AG27-29 SERPL-ACNC: 16.4 U/ML

## 2019-03-29 NOTE — PHYSICAL EXAM
[Fully active, able to carry on all pre-disease performance without restriction] : Status 0 - Fully active, able to carry on all pre-disease performance without restriction [Normal] : affect appropriate [de-identified] : Right breast with decreased nipple retraction , no erythema or significant swelling as previously noted.

## 2019-03-29 NOTE — HISTORY OF PRESENT ILLNESS
[Disease: _____________________] : Disease: [unfilled] [T: ___] : T[unfilled] [M: ___] : M[unfilled] [N: ___] : N[unfilled] [AJCC Stage: ____] : AJCC Stage: [unfilled] [de-identified] : Right breast cancer diagnosed at the age of 52\par 11/14 Presented with right breast mastitis and treated with antibiotics\par Multiple recurrence of mastitis over next two years, 3-4 per year with increased frequency in 2016-17\par 6/13/16 She had a 3 mm punch biopsy of the right breast which revealed interstitial neutrophilic infiltrate\par 04/25/17 Bilateral breast mammogram showed no significant interval change in the appearance of the diffuse breast edema and diffuse skin thickening in the right breast with no specific evidence of adenopathy or underlying abnormality. New skin thickening and increase parenchymal density in the left LIQ. Finding are indeterminate and biopsy was suggested. Bilateral breast ultrasound showed diffuse right sided skin thickening with focal left medial new skin thickening.\par 06/02/17 Diagnostic mammogram revealed signs of breast edema with skin thickening, progressive since prior studies. D/DX cellulitis vs. mastitis vs. inflammatory breast cancer.\par 06/03/17 Bilateral breast ultrasound again showed nonspecific and recurrent signs of inflammation in the right breast, consisting of skin thickening and breast edema. Patient reported having pain, erythema, fever and shaking chills, slightly better with antibiotics.\par 06/22/17 Right central breast, sonoguided core biopsy showed  invasive ductal carcinoma, SBR 6/9, ER 95%, NE 30% and HER 2 negative and DCIS solid pattern with intermediate grade nuclear atypia.\par 06/26/17 MRI of the breast showed a recently diagnosed right breast malignancy, with diffuse skin thickening and asymmetric parenchymal enhancement. A 5 mm enhancing nodule  in the right upper outer breast, possibly representing intramammary lymph node. No suspicious findings in the left breast.\par 06/27/17 CT scan of C/A/P showed an irregular right sided breast thickening compatible with biopsy proven right breast cancer. Osseous metastases involving the T10, T11 and L4 vertebral bodies. Indeterminate 2 cm left adrenal nodule.\par 06/27/17 Bone scan showed abnormal bone scan compatible with multifocal osseous metastases in the thoracolumbar spine, left iliac bone and in the right acetabulum.\par 7/14/17 BSO \par 7/21/17 Started letrozole and Ibrance  [de-identified] : Invasive ductal carcinoma, ER 95%, DE 30% and HER 2 negative [de-identified] : Nasrin continues to do well on letrozole and Ibrance since 7/17, although she has been noticing that she has been increasingly stiff and achy. However she started taking Claritin and did notice some improvement in her ankle arthralgias. She has not started on Femara as the pharmacy had dispensed her a 3 months supply of Letrozole, and since Claritin the arthralgias are slightly better.  The hot flashes are tolerable and so she has not been using Gabapentin that was previously given to her. \par Patient also mentions getting mouth sores around the tip of her lips and the inside of her cheeks x few months, states using Biotene with some improvement\par No recurrence of cellulitis since the episode in her left breast in 8/17. 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 Stable radiographic distribution of sclerotic metastases. \par \par CT scan from 11/2/18: Stable with no new findings\par CT scan from 7/11/18: Stable with no new findings\par Bone scan 7/11/18 stable to improved with no new areas of disease\par CT scan from 4/9/18: Stable osseous metastases and no new findings.\par CT scan from 1/16/18: Stable osseous metastases. No radiographic evidence of visceral metastasis with a stable 2 cm indeterminate left adrenal nodule\par \par \par \par \par \par \par \par

## 2019-03-30 ENCOUNTER — RX RENEWAL (OUTPATIENT)
Age: 54
End: 2019-03-30

## 2019-04-30 ENCOUNTER — OUTPATIENT (OUTPATIENT)
Dept: OUTPATIENT SERVICES | Facility: HOSPITAL | Age: 54
LOS: 1 days | Discharge: HOME | End: 2019-04-30

## 2019-04-30 DIAGNOSIS — Z98.891 HISTORY OF UTERINE SCAR FROM PREVIOUS SURGERY: Chronic | ICD-10-CM

## 2019-04-30 DIAGNOSIS — Z90.49 ACQUIRED ABSENCE OF OTHER SPECIFIED PARTS OF DIGESTIVE TRACT: Chronic | ICD-10-CM

## 2019-04-30 DIAGNOSIS — C50.911 MALIGNANT NEOPLASM OF UNSPECIFIED SITE OF RIGHT FEMALE BREAST: ICD-10-CM

## 2019-05-17 ENCOUNTER — RESULT REVIEW (OUTPATIENT)
Age: 54
End: 2019-05-17

## 2019-05-17 ENCOUNTER — APPOINTMENT (OUTPATIENT)
Dept: HEMATOLOGY ONCOLOGY | Facility: CLINIC | Age: 54
End: 2019-05-17
Payer: COMMERCIAL

## 2019-05-17 ENCOUNTER — APPOINTMENT (OUTPATIENT)
Dept: INFUSION THERAPY | Facility: HOSPITAL | Age: 54
End: 2019-05-17

## 2019-05-17 VITALS
OXYGEN SATURATION: 97 % | BODY MASS INDEX: 37.02 KG/M2 | WEIGHT: 235.89 LBS | DIASTOLIC BLOOD PRESSURE: 81 MMHG | TEMPERATURE: 98.8 F | SYSTOLIC BLOOD PRESSURE: 117 MMHG | RESPIRATION RATE: 17 BRPM | HEART RATE: 103 BPM

## 2019-05-17 LAB
BASOPHILS # BLD AUTO: 0 K/UL — SIGNIFICANT CHANGE UP (ref 0–0.2)
BASOPHILS NFR BLD AUTO: 1 % — SIGNIFICANT CHANGE UP (ref 0–2)
EOSINOPHIL # BLD AUTO: 0.1 K/UL — SIGNIFICANT CHANGE UP (ref 0–0.5)
EOSINOPHIL NFR BLD AUTO: 3 % — SIGNIFICANT CHANGE UP (ref 0–6)
HCT VFR BLD CALC: 39.1 % — SIGNIFICANT CHANGE UP (ref 34.5–45)
HGB BLD-MCNC: 14 G/DL — SIGNIFICANT CHANGE UP (ref 11.5–15.5)
LYMPHOCYTES # BLD AUTO: 0.8 K/UL — LOW (ref 1–3.3)
LYMPHOCYTES # BLD AUTO: 40 % — SIGNIFICANT CHANGE UP (ref 13–44)
MCHC RBC-ENTMCNC: 35 PG — HIGH (ref 27–34)
MCHC RBC-ENTMCNC: 35.7 G/DL — SIGNIFICANT CHANGE UP (ref 32–36)
MCV RBC AUTO: 98.1 FL — SIGNIFICANT CHANGE UP (ref 80–100)
MONOCYTES # BLD AUTO: 0.1 K/UL — SIGNIFICANT CHANGE UP (ref 0–0.9)
MONOCYTES NFR BLD AUTO: 5 % — SIGNIFICANT CHANGE UP (ref 2–14)
NEUTROPHILS # BLD AUTO: 1 K/UL — LOW (ref 1.8–7.4)
NEUTROPHILS NFR BLD AUTO: 51 % — SIGNIFICANT CHANGE UP (ref 43–77)
PLAT MORPH BLD: NORMAL — SIGNIFICANT CHANGE UP
PLATELET # BLD AUTO: 330 K/UL — SIGNIFICANT CHANGE UP (ref 150–400)
RBC # BLD: 3.99 M/UL — SIGNIFICANT CHANGE UP (ref 3.8–5.2)
RBC # FLD: 11.6 % — SIGNIFICANT CHANGE UP (ref 10.3–14.5)
RBC BLD AUTO: SIGNIFICANT CHANGE UP
WBC # BLD: 2.1 K/UL — LOW (ref 3.8–10.5)
WBC # FLD AUTO: 2.1 K/UL — LOW (ref 3.8–10.5)

## 2019-05-17 PROCEDURE — 99215 OFFICE O/P EST HI 40 MIN: CPT

## 2019-05-17 RX ORDER — GABAPENTIN 300 MG/1
300 CAPSULE ORAL
Refills: 0 | Status: DISCONTINUED | COMMUNITY
Start: 2018-09-14 | End: 2019-05-17

## 2019-05-17 RX ORDER — GABAPENTIN 100 MG/1
100 CAPSULE ORAL 3 TIMES DAILY
Qty: 90 | Refills: 2 | Status: DISCONTINUED | COMMUNITY
Start: 2018-09-14 | End: 2019-05-17

## 2019-05-17 NOTE — PHYSICAL EXAM
[Fully active, able to carry on all pre-disease performance without restriction] : Status 0 - Fully active, able to carry on all pre-disease performance without restriction [Normal] : affect appropriate [de-identified] : Right breast with decreased nipple retraction , no erythema or significant swelling as previously noted.

## 2019-05-17 NOTE — HISTORY OF PRESENT ILLNESS
[Disease: _____________________] : Disease: [unfilled] [T: ___] : T[unfilled] [N: ___] : N[unfilled] [M: ___] : M[unfilled] [AJCC Stage: ____] : AJCC Stage: [unfilled] [de-identified] : Right breast cancer diagnosed at the age of 52\par 11/14 Presented with right breast mastitis and treated with antibiotics\par Multiple recurrence of mastitis over next two years, 3-4 per year with increased frequency in 2016-17\par 6/13/16 She had a 3 mm punch biopsy of the right breast which revealed interstitial neutrophilic infiltrate\par 04/25/17 Bilateral breast mammogram showed no significant interval change in the appearance of the diffuse breast edema and diffuse skin thickening in the right breast with no specific evidence of adenopathy or underlying abnormality. New skin thickening and increase parenchymal density in the left LIQ. Finding are indeterminate and biopsy was suggested. Bilateral breast ultrasound showed diffuse right sided skin thickening with focal left medial new skin thickening.\par 06/02/17 Diagnostic mammogram revealed signs of breast edema with skin thickening, progressive since prior studies. D/DX cellulitis vs. mastitis vs. inflammatory breast cancer.\par 06/03/17 Bilateral breast ultrasound again showed nonspecific and recurrent signs of inflammation in the right breast, consisting of skin thickening and breast edema. Patient reported having pain, erythema, fever and shaking chills, slightly better with antibiotics.\par 06/22/17 Right central breast, sonoguided core biopsy showed  invasive ductal carcinoma, SBR 6/9, ER 95%, [place] 30% and HER 2 negative and DCIS solid pattern with intermediate grade nuclear atypia.\par 06/26/17 MRI of the breast showed a recently diagnosed right breast malignancy, with diffuse skin thickening and asymmetric parenchymal enhancement. A 5 mm enhancing nodule  in the right upper outer breast, possibly representing intramammary lymph node. No suspicious findings in the left breast.\par 06/27/17 CT scan of C/A/P showed an irregular right sided breast thickening compatible with biopsy proven right breast cancer. Osseous metastases involving the T10, T11 and L4 vertebral bodies. Indeterminate 2 cm left adrenal nodule.\par 06/27/17 Bone scan showed abnormal bone scan compatible with multifocal osseous metastases in the thoracolumbar spine, left iliac bone and in the right acetabulum.\par 7/14/17 BSO \par 7/21/17 Started letrozole and Ibrance  [de-identified] : Invasive ductal carcinoma, ER 95%, MD 30% and HER 2 negative [de-identified] : Nasrin continues to do well on letrozole and Ibrance since 7/17. \par She continues to experience stiffness and achiness with some improvement since she started taking Claritin especially in her ankles\par She has not started on Femara as the pharmacy had dispensed her a 3 months supply of Letrozole, and since Claritin the arthralgias are slightly better.  \par The hot flashes are getting better and so she has not been using Gabapentin that was previously given to her. \par The mouth sores have been under control after she started using Dexamethasone elixir about 1 week prior to the onset of her symptoms which usually occurs the second week of being on the Ibrance.\par No recurrence of cellulitis since the episode in her left breast in 8/17. 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 Stable radiographic distribution of sclerotic metastases. \par \par CT scan from 11/2/18: Stable with no new findings\par CT scan from 7/11/18: Stable with no new findings\par Bone scan 7/11/18 stable to improved with no new areas of disease\par CT scan from 4/9/18: Stable osseous metastases and no new findings.\par CT scan from 1/16/18: Stable osseous metastases. No radiographic evidence of visceral metastasis with a stable 2 cm indeterminate left adrenal nodule\par \par \par \par \par \par \par \par

## 2019-05-20 DIAGNOSIS — C79.51 SECONDARY MALIGNANT NEOPLASM OF BONE: ICD-10-CM

## 2019-05-20 LAB — CANCER AG27-29 SERPL-ACNC: 14.4 U/ML

## 2019-06-25 ENCOUNTER — NON-APPOINTMENT (OUTPATIENT)
Age: 54
End: 2019-06-25

## 2019-06-25 ENCOUNTER — APPOINTMENT (OUTPATIENT)
Dept: FAMILY MEDICINE | Facility: CLINIC | Age: 54
End: 2019-06-25
Payer: COMMERCIAL

## 2019-06-25 VITALS
HEART RATE: 95 BPM | OXYGEN SATURATION: 97 % | BODY MASS INDEX: 36.26 KG/M2 | TEMPERATURE: 98.1 F | HEIGHT: 66.93 IN | SYSTOLIC BLOOD PRESSURE: 126 MMHG | WEIGHT: 231 LBS | DIASTOLIC BLOOD PRESSURE: 78 MMHG

## 2019-06-25 DIAGNOSIS — Z12.83 ENCOUNTER FOR SCREENING FOR MALIGNANT NEOPLASM OF SKIN: ICD-10-CM

## 2019-06-25 DIAGNOSIS — Z13.220 ENCOUNTER FOR SCREENING FOR LIPOID DISORDERS: ICD-10-CM

## 2019-06-25 PROCEDURE — 99396 PREV VISIT EST AGE 40-64: CPT | Mod: 25

## 2019-06-25 PROCEDURE — 93000 ELECTROCARDIOGRAM COMPLETE: CPT

## 2019-06-25 PROCEDURE — 36415 COLL VENOUS BLD VENIPUNCTURE: CPT

## 2019-06-25 RX ORDER — ALPRAZOLAM 0.25 MG/1
0.25 TABLET ORAL
Qty: 60 | Refills: 0 | Status: COMPLETED | COMMUNITY
Start: 2017-09-27 | End: 2019-06-25

## 2019-06-25 NOTE — HISTORY OF PRESENT ILLNESS
[FreeTextEntry1] : CPE [de-identified] : SANDER is a 54 year female here for CPE. Currently taking Bupropion 150 mg, Ibrance 125 mg, Letrozole 2.5 mg. Xanex 0.25 mg PRN. She also take OTC biotin, calcium, magnesium, b12 1000 MCG and vitamin d 1000 IU. Pt presents today c/o that on her left foot, her hallux and index toe have been spreading apart and causing pain on the top of her foot. \par

## 2019-06-25 NOTE — END OF VISIT
[FreeTextEntry3] : Medical record entries made by the scribe today today, were at my direction and personally dictated to them by me, Dr. Katey Montero on Jun 25, 2019. I have reviewed the chart and agree that the record accurately reflects my personal performance of the history, physical exam, assessment, and plan.\par \par

## 2019-06-25 NOTE — HEALTH RISK ASSESSMENT
[Good] : ~his/her~  mood as  good [Monthly or less (1 pt)] : Monthly or less (1 point) [Yes] : Yes [1 or 2 (0 pts)] : 1 or 2 (0 points) [Never (0 pts)] : Never (0 points) [No] : In the past 12 months have you used drugs other than those required for medical reasons? No [With Family] : lives with family [None] : None [] :  [# Of Children ___] : has [unfilled] children [Feels Safe at Home] : Feels safe at home [de-identified] : social [] : No [de-identified] : keto [de-identified] : walking and exercise classes x2 week

## 2019-06-25 NOTE — ADDENDUM
[FreeTextEntry1] : I, Ivonne Polanco acting as a scribe for Dr. Katey Montero on Jun 25, 2019  at 2:31 PM\par

## 2019-06-25 NOTE — PHYSICAL EXAM
[Well Developed] : well developed [No Acute Distress] : no acute distress [Well Nourished] : well nourished [PERRL] : pupils equal round and reactive to light [Well-Appearing] : well-appearing [Normal Sclera/Conjunctiva] : normal sclera/conjunctiva [Normal Outer Ear/Nose] : the outer ears and nose were normal in appearance [EOMI] : extraocular movements intact [Normal Oropharynx] : the oropharynx was normal [No JVD] : no jugular venous distention [Supple] : supple [No Lymphadenopathy] : no lymphadenopathy [Thyroid Normal, No Nodules] : the thyroid was normal and there were no nodules present [No Respiratory Distress] : no respiratory distress  [Clear to Auscultation] : lungs were clear to auscultation bilaterally [No Accessory Muscle Use] : no accessory muscle use [Normal Rate] : normal rate  [Regular Rhythm] : with a regular rhythm [No Murmur] : no murmur heard [Normal S1, S2] : normal S1 and S2 [No Carotid Bruits] : no carotid bruits [Pedal Pulses Present] : the pedal pulses are present [No Abdominal Bruit] : a ~M bruit was not heard ~T in the abdomen [No Varicosities] : no varicosities [No Edema] : there was no peripheral edema [No Extremity Clubbing/Cyanosis] : no extremity clubbing/cyanosis [No Palpable Aorta] : no palpable aorta [Soft] : abdomen soft [Non Tender] : non-tender [No Masses] : no abdominal mass palpated [Non-distended] : non-distended [No HSM] : no HSM [Normal Bowel Sounds] : normal bowel sounds [No CVA Tenderness] : no CVA  tenderness [Normal Posterior Cervical Nodes] : no posterior cervical lymphadenopathy [Normal Anterior Cervical Nodes] : no anterior cervical lymphadenopathy [No Spinal Tenderness] : no spinal tenderness [No Joint Swelling] : no joint swelling [Grossly Normal Strength/Tone] : grossly normal strength/tone [Normal Gait] : normal gait [No Rash] : no rash [Coordination Grossly Intact] : coordination grossly intact [No Focal Deficits] : no focal deficits [Deep Tendon Reflexes (DTR)] : deep tendon reflexes were 2+ and symmetric [Normal Insight/Judgement] : insight and judgment were intact [Normal Affect] : the affect was normal

## 2019-06-25 NOTE — PLAN
[FreeTextEntry1] : - Blood work today\par - Medication refills ordered\par - Dermatology referral \par - Speak to  about Cologuard \par - Encouraged to follow up with podiatrist

## 2019-06-26 LAB
25(OH)D3 SERPL-MCNC: 36.1 NG/ML
ALBUMIN SERPL ELPH-MCNC: 4.2 G/DL
ALP BLD-CCNC: 61 U/L
ALT SERPL-CCNC: 20 U/L
ANION GAP SERPL CALC-SCNC: 12 MMOL/L
APPEARANCE: CLEAR
AST SERPL-CCNC: 19 U/L
BACTERIA: NEGATIVE
BASOPHILS # BLD AUTO: 0.05 K/UL
BASOPHILS NFR BLD AUTO: 2 %
BILIRUB SERPL-MCNC: 0.2 MG/DL
BILIRUBIN URINE: NEGATIVE
BLOOD URINE: NORMAL
BUN SERPL-MCNC: 20 MG/DL
CALCIUM SERPL-MCNC: 9.6 MG/DL
CHLORIDE SERPL-SCNC: 105 MMOL/L
CHOLEST SERPL-MCNC: 195 MG/DL
CHOLEST/HDLC SERPL: 2.6 RATIO
CO2 SERPL-SCNC: 23 MMOL/L
COLOR: NORMAL
CREAT SERPL-MCNC: 0.98 MG/DL
EOSINOPHIL # BLD AUTO: 0.03 K/UL
EOSINOPHIL NFR BLD AUTO: 1.2 %
ESTIMATED AVERAGE GLUCOSE: 103 MG/DL
GLUCOSE QUALITATIVE U: NEGATIVE
GLUCOSE SERPL-MCNC: 93 MG/DL
HBA1C MFR BLD HPLC: 5.2 %
HCT VFR BLD CALC: 40.5 %
HDLC SERPL-MCNC: 74 MG/DL
HGB BLD-MCNC: 13.5 G/DL
HYALINE CASTS: 0 /LPF
IMM GRANULOCYTES NFR BLD AUTO: 0.4 %
KETONES URINE: NEGATIVE
LDLC SERPL CALC-MCNC: 104 MG/DL
LEUKOCYTE ESTERASE URINE: NEGATIVE
LYMPHOCYTES # BLD AUTO: 0.92 K/UL
LYMPHOCYTES NFR BLD AUTO: 36.7 %
MAN DIFF?: NORMAL
MCHC RBC-ENTMCNC: 33.3 GM/DL
MCHC RBC-ENTMCNC: 34.4 PG
MCV RBC AUTO: 103.1 FL
MICROSCOPIC-UA: NORMAL
MONOCYTES # BLD AUTO: 0.14 K/UL
MONOCYTES NFR BLD AUTO: 5.6 %
NEUTROPHILS # BLD AUTO: 1.36 K/UL
NEUTROPHILS NFR BLD AUTO: 54.1 %
NITRITE URINE: NEGATIVE
PH URINE: 5.5
PLATELET # BLD AUTO: 196 K/UL
POTASSIUM SERPL-SCNC: 4.5 MMOL/L
PROT SERPL-MCNC: 6.8 G/DL
PROTEIN URINE: NEGATIVE
RBC # BLD: 3.93 M/UL
RBC # FLD: 13 %
RED BLOOD CELLS URINE: 1 /HPF
SODIUM SERPL-SCNC: 140 MMOL/L
SPECIFIC GRAVITY URINE: 1.02
SQUAMOUS EPITHELIAL CELLS: 1 /HPF
TRIGL SERPL-MCNC: 86 MG/DL
TSH SERPL-ACNC: 1.81 UIU/ML
URATE SERPL-MCNC: 4.3 MG/DL
UROBILINOGEN URINE: NORMAL
WBC # FLD AUTO: 2.51 K/UL
WHITE BLOOD CELLS URINE: 1 /HPF

## 2019-07-15 ENCOUNTER — FORM ENCOUNTER (OUTPATIENT)
Age: 54
End: 2019-07-15

## 2019-07-16 ENCOUNTER — OUTPATIENT (OUTPATIENT)
Dept: OUTPATIENT SERVICES | Facility: HOSPITAL | Age: 54
LOS: 1 days | Discharge: HOME | End: 2019-07-16

## 2019-07-16 ENCOUNTER — APPOINTMENT (OUTPATIENT)
Dept: CT IMAGING | Facility: IMAGING CENTER | Age: 54
End: 2019-07-16
Payer: COMMERCIAL

## 2019-07-16 ENCOUNTER — OUTPATIENT (OUTPATIENT)
Dept: OUTPATIENT SERVICES | Facility: HOSPITAL | Age: 54
LOS: 1 days | End: 2019-07-16
Payer: COMMERCIAL

## 2019-07-16 ENCOUNTER — APPOINTMENT (OUTPATIENT)
Dept: NUCLEAR MEDICINE | Facility: IMAGING CENTER | Age: 54
End: 2019-07-16
Payer: COMMERCIAL

## 2019-07-16 DIAGNOSIS — Z98.891 HISTORY OF UTERINE SCAR FROM PREVIOUS SURGERY: Chronic | ICD-10-CM

## 2019-07-16 DIAGNOSIS — C50.911 MALIGNANT NEOPLASM OF UNSPECIFIED SITE OF RIGHT FEMALE BREAST: ICD-10-CM

## 2019-07-16 DIAGNOSIS — Z90.49 ACQUIRED ABSENCE OF OTHER SPECIFIED PARTS OF DIGESTIVE TRACT: Chronic | ICD-10-CM

## 2019-07-16 DIAGNOSIS — C79.51 SECONDARY MALIGNANT NEOPLASM OF BONE: ICD-10-CM

## 2019-07-16 PROCEDURE — 78306 BONE IMAGING WHOLE BODY: CPT | Mod: 26

## 2019-07-16 PROCEDURE — 71260 CT THORAX DX C+: CPT

## 2019-07-16 PROCEDURE — 71260 CT THORAX DX C+: CPT | Mod: 26

## 2019-07-16 PROCEDURE — A9561: CPT

## 2019-07-16 PROCEDURE — 74177 CT ABD & PELVIS W/CONTRAST: CPT | Mod: 26

## 2019-07-16 PROCEDURE — 74177 CT ABD & PELVIS W/CONTRAST: CPT

## 2019-07-16 PROCEDURE — 78306 BONE IMAGING WHOLE BODY: CPT

## 2019-07-18 ENCOUNTER — RESULT REVIEW (OUTPATIENT)
Age: 54
End: 2019-07-18

## 2019-07-18 ENCOUNTER — APPOINTMENT (OUTPATIENT)
Dept: HEMATOLOGY ONCOLOGY | Facility: CLINIC | Age: 54
End: 2019-07-18
Payer: COMMERCIAL

## 2019-07-18 VITALS
HEART RATE: 91 BPM | RESPIRATION RATE: 16 BRPM | DIASTOLIC BLOOD PRESSURE: 81 MMHG | SYSTOLIC BLOOD PRESSURE: 113 MMHG | WEIGHT: 226.63 LBS | BODY MASS INDEX: 35.57 KG/M2 | OXYGEN SATURATION: 98 % | TEMPERATURE: 98.6 F

## 2019-07-18 LAB
ALBUMIN SERPL ELPH-MCNC: 4.5 G/DL
ALP BLD-CCNC: 55 U/L
ALT SERPL-CCNC: 17 U/L
ANION GAP SERPL CALC-SCNC: 15 MMOL/L
AST SERPL-CCNC: 18 U/L
BASOPHILS # BLD AUTO: 0 K/UL — SIGNIFICANT CHANGE UP (ref 0–0.2)
BASOPHILS NFR BLD AUTO: 1 % — SIGNIFICANT CHANGE UP (ref 0–2)
BILIRUB SERPL-MCNC: 0.3 MG/DL
BUN SERPL-MCNC: 17 MG/DL
CALCIUM SERPL-MCNC: 9.7 MG/DL
CEA SERPL-MCNC: 1.4 NG/ML
CHLORIDE SERPL-SCNC: 103 MMOL/L
CO2 SERPL-SCNC: 23 MMOL/L
CREAT SERPL-MCNC: 0.93 MG/DL
EOSINOPHIL # BLD AUTO: 0 K/UL — SIGNIFICANT CHANGE UP (ref 0–0.5)
EOSINOPHIL NFR BLD AUTO: 1.4 % — SIGNIFICANT CHANGE UP (ref 0–6)
GLUCOSE SERPL-MCNC: 95 MG/DL
HCT VFR BLD CALC: 41.6 % — SIGNIFICANT CHANGE UP (ref 34.5–45)
HGB BLD-MCNC: 13.9 G/DL — SIGNIFICANT CHANGE UP (ref 11.5–15.5)
LYMPHOCYTES # BLD AUTO: 1.1 K/UL — SIGNIFICANT CHANGE UP (ref 1–3.3)
LYMPHOCYTES # BLD AUTO: 41.9 % — SIGNIFICANT CHANGE UP (ref 13–44)
MCHC RBC-ENTMCNC: 33.5 G/DL — SIGNIFICANT CHANGE UP (ref 32–36)
MCHC RBC-ENTMCNC: 34.3 PG — HIGH (ref 27–34)
MCV RBC AUTO: 102 FL — HIGH (ref 80–100)
MONOCYTES # BLD AUTO: 0.2 K/UL — SIGNIFICANT CHANGE UP (ref 0–0.9)
MONOCYTES NFR BLD AUTO: 7.6 % — SIGNIFICANT CHANGE UP (ref 2–14)
NEUTROPHILS # BLD AUTO: 1.3 K/UL — LOW (ref 1.8–7.4)
NEUTROPHILS NFR BLD AUTO: 48.2 % — SIGNIFICANT CHANGE UP (ref 43–77)
PLATELET # BLD AUTO: 343 K/UL — SIGNIFICANT CHANGE UP (ref 150–400)
POTASSIUM SERPL-SCNC: 4.9 MMOL/L
PROT SERPL-MCNC: 6.8 G/DL
RBC # BLD: 4.06 M/UL — SIGNIFICANT CHANGE UP (ref 3.8–5.2)
RBC # FLD: 12.9 % — SIGNIFICANT CHANGE UP (ref 10.3–14.5)
SODIUM SERPL-SCNC: 141 MMOL/L
WBC # BLD: 2.6 K/UL — LOW (ref 3.8–10.5)
WBC # FLD AUTO: 2.6 K/UL — LOW (ref 3.8–10.5)

## 2019-07-18 PROCEDURE — 99215 OFFICE O/P EST HI 40 MIN: CPT

## 2019-07-18 NOTE — HISTORY OF PRESENT ILLNESS
[Disease: _____________________] : Disease: [unfilled] [T: ___] : T[unfilled] [N: ___] : N[unfilled] [M: ___] : M[unfilled] [AJCC Stage: ____] : AJCC Stage: [unfilled] [de-identified] : Right breast cancer diagnosed at the age of 52\par 11/14 Presented with right breast mastitis and treated with antibiotics\par Multiple recurrence of mastitis over next two years, 3-4 per year with increased frequency in 2016-17\par 6/13/16 She had a 3 mm punch biopsy of the right breast which revealed interstitial neutrophilic infiltrate\par 04/25/17 Bilateral breast mammogram showed no significant interval change in the appearance of the diffuse breast edema and diffuse skin thickening in the right breast with no specific evidence of adenopathy or underlying abnormality. New skin thickening and increase parenchymal density in the left LIQ. Finding are indeterminate and biopsy was suggested. Bilateral breast ultrasound showed diffuse right sided skin thickening with focal left medial new skin thickening.\par 06/02/17 Diagnostic mammogram revealed signs of breast edema with skin thickening, progressive since prior studies. D/DX cellulitis vs. mastitis vs. inflammatory breast cancer.\par 06/03/17 Bilateral breast ultrasound again showed nonspecific and recurrent signs of inflammation in the right breast, consisting of skin thickening and breast edema. Patient reported having pain, erythema, fever and shaking chills, slightly better with antibiotics.\par 06/22/17 Right central breast, sonoguided core biopsy showed  invasive ductal carcinoma, SBR 6/9, ER 95%, CT 30% and HER 2 negative and DCIS solid pattern with intermediate grade nuclear atypia.\par 06/26/17 MRI of the breast showed a recently diagnosed right breast malignancy, with diffuse skin thickening and asymmetric parenchymal enhancement. A 5 mm enhancing nodule  in the right upper outer breast, possibly representing intramammary lymph node. No suspicious findings in the left breast.\par 06/27/17 CT scan of C/A/P showed an irregular right sided breast thickening compatible with biopsy proven right breast cancer. Osseous metastases involving the T10, T11 and L4 vertebral bodies. Indeterminate 2 cm left adrenal nodule.\par 06/27/17 Bone scan showed abnormal bone scan compatible with multifocal osseous metastases in the thoracolumbar spine, left iliac bone and in the right acetabulum.\par 7/14/17 BSO \par 7/21/17 Started letrozole and Ibrance  [de-identified] : Invasive ductal carcinoma, ER 95%, [place] 30% and HER 2 negative [de-identified] : Nasrin presents to the office accompanied by her spouse for an evaluation and to discuss CT scan findings from 07/16.  She  is  doing well on letrozole and Ibrance since 7/17.\par She continues to experience stiffness and achiness in her joints after a period of some improvement with intake Claritin with the Letrozole, but in the past few months she has been experiencing more pain and stiffness. She wants to try brand name Femara to see if it may help with the arthralgias.\par The hot flashes are getting better and she is no longer on anything to manage her symptoms. \par The mouth sores have been under control after she started using Dexamethasone elixir about 1 week prior to the onset of her symptoms which usually occurs the second week of being on the Ibrance. She is overall doing well and feeling full of energy\par \par 7/16/19 CT Scan showed stable examination. Sclerotic osseous metastatic disease without significant change.\par 7/16/19 Bone scan showed no significant interval change compared to the previous bone scan of 7/11/18, showing resolved or less prominent metastatic lesions from 6/27/17. No new osseous lesions are identified. 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 Stable radiographic distribution of sclerotic metastases. \par CT scan from 11/2/18: Stable with no new findings\par CT scan from 7/11/18: Stable with no new findings\par Bone scan 7/11/18 stable to improved with no new areas of disease\par CT scan from 4/9/18: Stable osseous metastases and no new findings.\par CT scan from 1/16/18: Stable osseous metastases. No radiographic evidence of visceral metastasis with a stable 2 cm indeterminate left adrenal nodule\par \par \par \par \par \par \par \par

## 2019-07-18 NOTE — PHYSICAL EXAM
[Fully active, able to carry on all pre-disease performance without restriction] : Status 0 - Fully active, able to carry on all pre-disease performance without restriction [Normal] : affect appropriate [de-identified] : Right breast with decreased nipple retraction , no erythema or significant swelling as previously noted.

## 2019-07-19 LAB — CANCER AG27-29 SERPL-ACNC: 13.2 U/ML

## 2019-07-22 ENCOUNTER — RX RENEWAL (OUTPATIENT)
Age: 54
End: 2019-07-22

## 2019-09-11 ENCOUNTER — OUTPATIENT (OUTPATIENT)
Dept: OUTPATIENT SERVICES | Facility: HOSPITAL | Age: 54
LOS: 1 days | Discharge: HOME | End: 2019-09-11

## 2019-09-11 DIAGNOSIS — Z90.49 ACQUIRED ABSENCE OF OTHER SPECIFIED PARTS OF DIGESTIVE TRACT: Chronic | ICD-10-CM

## 2019-09-11 DIAGNOSIS — C50.911 MALIGNANT NEOPLASM OF UNSPECIFIED SITE OF RIGHT FEMALE BREAST: ICD-10-CM

## 2019-09-11 DIAGNOSIS — Z98.891 HISTORY OF UTERINE SCAR FROM PREVIOUS SURGERY: Chronic | ICD-10-CM

## 2019-09-11 DIAGNOSIS — C79.51 SECONDARY MALIGNANT NEOPLASM OF BONE: ICD-10-CM

## 2019-09-13 ENCOUNTER — RESULT REVIEW (OUTPATIENT)
Age: 54
End: 2019-09-13

## 2019-09-13 ENCOUNTER — APPOINTMENT (OUTPATIENT)
Dept: HEMATOLOGY ONCOLOGY | Facility: CLINIC | Age: 54
End: 2019-09-13
Payer: COMMERCIAL

## 2019-09-13 VITALS
DIASTOLIC BLOOD PRESSURE: 87 MMHG | HEART RATE: 88 BPM | RESPIRATION RATE: 17 BRPM | SYSTOLIC BLOOD PRESSURE: 118 MMHG | TEMPERATURE: 98.8 F | BODY MASS INDEX: 34.6 KG/M2 | OXYGEN SATURATION: 98 % | WEIGHT: 220.46 LBS

## 2019-09-13 LAB
BASOPHILS # BLD AUTO: 0 K/UL — SIGNIFICANT CHANGE UP (ref 0–0.2)
BASOPHILS NFR BLD AUTO: 1.6 % — SIGNIFICANT CHANGE UP (ref 0–2)
EOSINOPHIL # BLD AUTO: 0.1 K/UL — SIGNIFICANT CHANGE UP (ref 0–0.5)
EOSINOPHIL NFR BLD AUTO: 2.4 % — SIGNIFICANT CHANGE UP (ref 0–6)
HCT VFR BLD CALC: 42.8 % — SIGNIFICANT CHANGE UP (ref 34.5–45)
HGB BLD-MCNC: 14.8 G/DL — SIGNIFICANT CHANGE UP (ref 11.5–15.5)
LYMPHOCYTES # BLD AUTO: 1 K/UL — SIGNIFICANT CHANGE UP (ref 1–3.3)
LYMPHOCYTES # BLD AUTO: 40.5 % — SIGNIFICANT CHANGE UP (ref 13–44)
MCHC RBC-ENTMCNC: 34.5 G/DL — SIGNIFICANT CHANGE UP (ref 32–36)
MCHC RBC-ENTMCNC: 35.4 PG — HIGH (ref 27–34)
MCV RBC AUTO: 103 FL — HIGH (ref 80–100)
MONOCYTES # BLD AUTO: 0.1 K/UL — SIGNIFICANT CHANGE UP (ref 0–0.9)
MONOCYTES NFR BLD AUTO: 5 % — SIGNIFICANT CHANGE UP (ref 2–14)
NEUTROPHILS # BLD AUTO: 1.2 K/UL — LOW (ref 1.8–7.4)
NEUTROPHILS NFR BLD AUTO: 50.5 % — SIGNIFICANT CHANGE UP (ref 43–77)
PLATELET # BLD AUTO: 330 K/UL — SIGNIFICANT CHANGE UP (ref 150–400)
RBC # BLD: 4.18 M/UL — SIGNIFICANT CHANGE UP (ref 3.8–5.2)
RBC # FLD: 12.5 % — SIGNIFICANT CHANGE UP (ref 10.3–14.5)
WBC # BLD: 2.4 K/UL — LOW (ref 3.8–10.5)
WBC # FLD AUTO: 2.4 K/UL — LOW (ref 3.8–10.5)

## 2019-09-13 PROCEDURE — 99214 OFFICE O/P EST MOD 30 MIN: CPT

## 2019-09-13 NOTE — PHYSICAL EXAM
[Fully active, able to carry on all pre-disease performance without restriction] : Status 0 - Fully active, able to carry on all pre-disease performance without restriction [Normal] : affect appropriate [de-identified] : Right breast with decreased nipple retraction , no erythema or significant swelling as previously noted.

## 2019-09-13 NOTE — HISTORY OF PRESENT ILLNESS
[Disease: _____________________] : Disease: [unfilled] [T: ___] : T[unfilled] [N: ___] : N[unfilled] [M: ___] : M[unfilled] [AJCC Stage: ____] : AJCC Stage: [unfilled] [de-identified] : Right breast cancer diagnosed at the age of 52\par 11/14 Presented with right breast mastitis and treated with antibiotics\par Multiple recurrence of mastitis over next two years, 3-4 per year with increased frequency in 2016-17\par 6/13/16 She had a 3 mm punch biopsy of the right breast which revealed interstitial neutrophilic infiltrate\par 04/25/17 Bilateral breast mammogram showed no significant interval change in the appearance of the diffuse breast edema and diffuse skin thickening in the right breast with no specific evidence of adenopathy or underlying abnormality. New skin thickening and increase parenchymal density in the left LIQ. Finding are indeterminate and biopsy was suggested. Bilateral breast ultrasound showed diffuse right sided skin thickening with focal left medial new skin thickening.\par 06/02/17 Diagnostic mammogram revealed signs of breast edema with skin thickening, progressive since prior studies. D/DX cellulitis vs. mastitis vs. inflammatory breast cancer.\par 06/03/17 Bilateral breast ultrasound again showed nonspecific and recurrent signs of inflammation in the right breast, consisting of skin thickening and breast edema. Patient reported having pain, erythema, fever and shaking chills, slightly better with antibiotics.\par 06/22/17 Right central breast, sonoguided core biopsy showed  invasive ductal carcinoma, SBR 6/9, ER 95%, [place] 30% and HER 2 negative and DCIS solid pattern with intermediate grade nuclear atypia.\par 06/26/17 MRI of the breast showed a recently diagnosed right breast malignancy, with diffuse skin thickening and asymmetric parenchymal enhancement. A 5 mm enhancing nodule  in the right upper outer breast, possibly representing intramammary lymph node. No suspicious findings in the left breast.\par 06/27/17 CT scan of C/A/P showed an irregular right sided breast thickening compatible with biopsy proven right breast cancer. Osseous metastases involving the T10, T11 and L4 vertebral bodies. Indeterminate 2 cm left adrenal nodule.\par 06/27/17 Bone scan showed abnormal bone scan compatible with multifocal osseous metastases in the thoracolumbar spine, left iliac bone and in the right acetabulum.\par 7/14/17 BSO \par 7/21/17 Started letrozole and Ibrance  [de-identified] : Invasive ductal carcinoma, ER 95%, [place] 30% and HER 2 negative [de-identified] : Nasrin continues to do well on letrozole and Ibrance which she has been on since 7/17. \par She still experiences stiffness and achiness in her joints in the mornings, but better with addition of Claritin and trying to stay active as that helps with the achiness. \par Brand name for Letrozole ( Femara) was send to her pharmacy to see if that would help with her arthralgia but her insurance denied the brand name AI.\par The hot flashes are getting better and she is no longer on anything to manage her symptoms. \par She has not had another episode of mouth sores after trying the Dexamethasone elixir at the initial onset a few months ago.\par She is overall doing well and feeling full of energy.\par She has been doing the Ketone diet  on and off for several months and has lost > 20 lbs.\par \par 7/16/19 CT Scan showed stable examination. Sclerotic osseous metastatic disease without significant change.\par 7/16/19 Bone scan showed no significant interval change compared to the previous bone scan of 7/11/18, showing resolved or less prominent metastatic lesions from 6/27/17. No new osseous lesions are identified. 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 Stable radiographic distribution of sclerotic metastases. \par CT scan from 11/2/18: Stable with no new findings\par CT scan from 7/11/18: Stable with no new findings\par Bone scan 7/11/18 stable to improved with no new areas of disease\par CT scan from 4/9/18: Stable osseous metastases and no new findings.\par CT scan from 1/16/18: Stable osseous metastases. No radiographic evidence of visceral metastasis with a stable 2 cm indeterminate left adrenal nodule\par \par \par \par \par \par \par \par

## 2019-09-17 LAB
ALBUMIN SERPL ELPH-MCNC: 4.9 G/DL
ALP BLD-CCNC: 55 U/L
ALT SERPL-CCNC: 17 U/L
ANION GAP SERPL CALC-SCNC: 14 MMOL/L
AST SERPL-CCNC: 17 U/L
BILIRUB SERPL-MCNC: 0.4 MG/DL
BUN SERPL-MCNC: 16 MG/DL
CALCIUM SERPL-MCNC: 9.6 MG/DL
CANCER AG27-29 SERPL-ACNC: 13.6 U/ML
CEA SERPL-MCNC: 1.5 NG/ML
CHLORIDE SERPL-SCNC: 104 MMOL/L
CO2 SERPL-SCNC: 23 MMOL/L
CREAT SERPL-MCNC: 0.87 MG/DL
GLUCOSE SERPL-MCNC: 102 MG/DL
POTASSIUM SERPL-SCNC: 4.2 MMOL/L
PROT SERPL-MCNC: 7.4 G/DL
SODIUM SERPL-SCNC: 141 MMOL/L

## 2019-11-04 ENCOUNTER — OUTPATIENT (OUTPATIENT)
Dept: OUTPATIENT SERVICES | Facility: HOSPITAL | Age: 54
LOS: 1 days | Discharge: HOME | End: 2019-11-04

## 2019-11-04 DIAGNOSIS — Z98.891 HISTORY OF UTERINE SCAR FROM PREVIOUS SURGERY: Chronic | ICD-10-CM

## 2019-11-04 DIAGNOSIS — C50.911 MALIGNANT NEOPLASM OF UNSPECIFIED SITE OF RIGHT FEMALE BREAST: ICD-10-CM

## 2019-11-04 DIAGNOSIS — Z90.49 ACQUIRED ABSENCE OF OTHER SPECIFIED PARTS OF DIGESTIVE TRACT: Chronic | ICD-10-CM

## 2019-11-10 ENCOUNTER — FORM ENCOUNTER (OUTPATIENT)
Age: 54
End: 2019-11-10

## 2019-11-11 ENCOUNTER — OUTPATIENT (OUTPATIENT)
Dept: OUTPATIENT SERVICES | Facility: HOSPITAL | Age: 54
LOS: 1 days | End: 2019-11-11
Payer: COMMERCIAL

## 2019-11-11 ENCOUNTER — APPOINTMENT (OUTPATIENT)
Dept: CT IMAGING | Facility: CLINIC | Age: 54
End: 2019-11-11
Payer: COMMERCIAL

## 2019-11-11 DIAGNOSIS — Z98.891 HISTORY OF UTERINE SCAR FROM PREVIOUS SURGERY: Chronic | ICD-10-CM

## 2019-11-11 DIAGNOSIS — C50.911 MALIGNANT NEOPLASM OF UNSPECIFIED SITE OF RIGHT FEMALE BREAST: ICD-10-CM

## 2019-11-11 DIAGNOSIS — Z90.49 ACQUIRED ABSENCE OF OTHER SPECIFIED PARTS OF DIGESTIVE TRACT: Chronic | ICD-10-CM

## 2019-11-11 PROCEDURE — 74177 CT ABD & PELVIS W/CONTRAST: CPT

## 2019-11-11 PROCEDURE — 74177 CT ABD & PELVIS W/CONTRAST: CPT | Mod: 26

## 2019-11-11 PROCEDURE — 71260 CT THORAX DX C+: CPT | Mod: 26

## 2019-11-11 PROCEDURE — 82565 ASSAY OF CREATININE: CPT

## 2019-11-11 PROCEDURE — 71260 CT THORAX DX C+: CPT

## 2019-11-15 ENCOUNTER — RESULT REVIEW (OUTPATIENT)
Age: 54
End: 2019-11-15

## 2019-11-15 ENCOUNTER — APPOINTMENT (OUTPATIENT)
Dept: INFUSION THERAPY | Facility: HOSPITAL | Age: 54
End: 2019-11-15
Payer: COMMERCIAL

## 2019-11-15 ENCOUNTER — APPOINTMENT (OUTPATIENT)
Dept: HEMATOLOGY ONCOLOGY | Facility: CLINIC | Age: 54
End: 2019-11-15
Payer: COMMERCIAL

## 2019-11-15 VITALS
TEMPERATURE: 98.9 F | SYSTOLIC BLOOD PRESSURE: 108 MMHG | RESPIRATION RATE: 16 BRPM | OXYGEN SATURATION: 97 % | BODY MASS INDEX: 34.26 KG/M2 | HEART RATE: 91 BPM | WEIGHT: 218.26 LBS | DIASTOLIC BLOOD PRESSURE: 78 MMHG

## 2019-11-15 LAB
BASOPHILS # BLD AUTO: 0.1 K/UL — SIGNIFICANT CHANGE UP (ref 0–0.2)
BASOPHILS NFR BLD AUTO: 2.5 % — HIGH (ref 0–2)
EOSINOPHIL # BLD AUTO: 0 K/UL — SIGNIFICANT CHANGE UP (ref 0–0.5)
EOSINOPHIL NFR BLD AUTO: 1.6 % — SIGNIFICANT CHANGE UP (ref 0–6)
HCT VFR BLD CALC: 40.5 % — SIGNIFICANT CHANGE UP (ref 34.5–45)
HGB BLD-MCNC: 14.3 G/DL — SIGNIFICANT CHANGE UP (ref 11.5–15.5)
LYMPHOCYTES # BLD AUTO: 0.9 K/UL — LOW (ref 1–3.3)
LYMPHOCYTES # BLD AUTO: 39.5 % — SIGNIFICANT CHANGE UP (ref 13–44)
MCHC RBC-ENTMCNC: 35.2 G/DL — SIGNIFICANT CHANGE UP (ref 32–36)
MCHC RBC-ENTMCNC: 36 PG — HIGH (ref 27–34)
MCV RBC AUTO: 102 FL — HIGH (ref 80–100)
MONOCYTES # BLD AUTO: 0.1 K/UL — SIGNIFICANT CHANGE UP (ref 0–0.9)
MONOCYTES NFR BLD AUTO: 6.7 % — SIGNIFICANT CHANGE UP (ref 2–14)
NEUTROPHILS # BLD AUTO: 1.1 K/UL — LOW (ref 1.8–7.4)
NEUTROPHILS NFR BLD AUTO: 49.7 % — SIGNIFICANT CHANGE UP (ref 43–77)
PLATELET # BLD AUTO: 263 K/UL — SIGNIFICANT CHANGE UP (ref 150–400)
RBC # BLD: 3.96 M/UL — SIGNIFICANT CHANGE UP (ref 3.8–5.2)
RBC # FLD: 11.7 % — SIGNIFICANT CHANGE UP (ref 10.3–14.5)
WBC # BLD: 2.2 K/UL — LOW (ref 3.8–10.5)
WBC # FLD AUTO: 2.2 K/UL — LOW (ref 3.8–10.5)

## 2019-11-15 PROCEDURE — G0008: CPT

## 2019-11-15 PROCEDURE — 99215 OFFICE O/P EST HI 40 MIN: CPT

## 2019-11-18 DIAGNOSIS — C79.51 SECONDARY MALIGNANT NEOPLASM OF BONE: ICD-10-CM

## 2019-11-18 LAB
ALBUMIN SERPL ELPH-MCNC: 4.6 G/DL
ALP BLD-CCNC: 57 U/L
ALT SERPL-CCNC: 41 U/L
ANION GAP SERPL CALC-SCNC: 14 MMOL/L
AST SERPL-CCNC: 21 U/L
BILIRUB SERPL-MCNC: 0.3 MG/DL
BUN SERPL-MCNC: 18 MG/DL
CALCIUM SERPL-MCNC: 9.9 MG/DL
CANCER AG27-29 SERPL-ACNC: 13.2 U/ML
CEA SERPL-MCNC: 1.3 NG/ML
CHLORIDE SERPL-SCNC: 102 MMOL/L
CO2 SERPL-SCNC: 26 MMOL/L
CREAT SERPL-MCNC: 0.99 MG/DL
GLUCOSE SERPL-MCNC: 97 MG/DL
POTASSIUM SERPL-SCNC: 4.6 MMOL/L
PROT SERPL-MCNC: 7.1 G/DL
SODIUM SERPL-SCNC: 141 MMOL/L

## 2019-11-19 NOTE — HISTORY OF PRESENT ILLNESS
[Disease: _____________________] : Disease: [unfilled] [T: ___] : T[unfilled] [N: ___] : N[unfilled] [M: ___] : M[unfilled] [AJCC Stage: ____] : AJCC Stage: [unfilled] [de-identified] : Invasive ductal carcinoma, ER 95%, MI 30% and HER 2 negative [de-identified] : Right breast cancer diagnosed at the age of 52\par 11/14 Presented with right breast mastitis and treated with antibiotics\par Multiple recurrence of mastitis over next two years, 3-4 per year with increased frequency in 2016-17\par 6/13/16 She had a 3 mm punch biopsy of the right breast which revealed interstitial neutrophilic infiltrate\par 04/25/17 Bilateral breast mammogram showed no significant interval change in the appearance of the diffuse breast edema and diffuse skin thickening in the right breast with no specific evidence of adenopathy or underlying abnormality. New skin thickening and increase parenchymal density in the left LIQ. Finding are indeterminate and biopsy was suggested. Bilateral breast ultrasound showed diffuse right sided skin thickening with focal left medial new skin thickening.\par 06/02/17 Diagnostic mammogram revealed signs of breast edema with skin thickening, progressive since prior studies. D/DX cellulitis vs. mastitis vs. inflammatory breast cancer.\par 06/03/17 Bilateral breast ultrasound again showed nonspecific and recurrent signs of inflammation in the right breast, consisting of skin thickening and breast edema. Patient reported having pain, erythema, fever and shaking chills, slightly better with antibiotics.\par 06/22/17 Right central breast, sonoguided core biopsy showed  invasive ductal carcinoma, SBR 6/9, ER 95%, [place] 30% and HER 2 negative and DCIS solid pattern with intermediate grade nuclear atypia.\par 06/26/17 MRI of the breast showed a recently diagnosed right breast malignancy, with diffuse skin thickening and asymmetric parenchymal enhancement. A 5 mm enhancing nodule  in the right upper outer breast, possibly representing intramammary lymph node. No suspicious findings in the left breast.\par 06/27/17 CT scan of C/A/P showed an irregular right sided breast thickening compatible with biopsy proven right breast cancer. Osseous metastases involving the T10, T11 and L4 vertebral bodies. Indeterminate 2 cm left adrenal nodule.\par 06/27/17 Bone scan showed abnormal bone scan compatible with multifocal osseous metastases in the thoracolumbar spine, left iliac bone and in the right acetabulum.\par 7/14/17 BSO \par 7/21/17 Started letrozole and Ibrance  [de-identified] : Nasrin continues to do well on letrozole and Ibrance which she has been on since 7/17. \par She still experiences stiffness and achiness in her joints in the mornings, but better with addition of Claritin and trying to stay active as that helps with the achiness. \par The hot flashes are getting better and she is no longer on anything to manage her symptoms. She is no longer getting any mouth sores and so she d/c the dexamethasone elixir.\par She is overall doing well and feeling full of energy.\par She has been doing the Ketone diet  on and off for several months and has lost nearly 30 lbs.\par Patient inquired about doing genetic testing and if her tissue specimen was submitted to [facility] for immunotherapy\par \par 7/16/19 CT Scan showed stable examination. Sclerotic osseous metastatic disease without significant change.\par 7/16/19 Bone scan showed no significant interval change compared to the previous bone scan of 7/11/18, showing resolved or less prominent metastatic lesions from 6/27/17. No new osseous lesions are identified. 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 Stable radiographic distribution of sclerotic metastases. \par CT scan from 11/2/18: Stable with no new findings\par CT scan from 7/11/18: Stable with no new findings\par Bone scan 7/11/18 stable to improved with no new areas of disease\par CT scan from 4/9/18: Stable osseous metastases and no new findings.\par CT scan from 1/16/18: Stable osseous metastases. No radiographic evidence of visceral metastasis with a stable 2 cm indeterminate left adrenal nodule\par \par \par \par \par \par \par \par

## 2019-11-19 NOTE — PHYSICAL EXAM
[Fully active, able to carry on all pre-disease performance without restriction] : Status 0 - Fully active, able to carry on all pre-disease performance without restriction [Normal] : affect appropriate [de-identified] : Right breast with decreased nipple retraction , no erythema or significant swelling as previously noted.

## 2019-12-06 ENCOUNTER — RX RENEWAL (OUTPATIENT)
Age: 54
End: 2019-12-06

## 2019-12-09 ENCOUNTER — RX RENEWAL (OUTPATIENT)
Age: 54
End: 2019-12-09

## 2020-01-22 ENCOUNTER — OUTPATIENT (OUTPATIENT)
Dept: OUTPATIENT SERVICES | Facility: HOSPITAL | Age: 55
LOS: 1 days | Discharge: HOME | End: 2020-01-22

## 2020-01-22 DIAGNOSIS — Z90.49 ACQUIRED ABSENCE OF OTHER SPECIFIED PARTS OF DIGESTIVE TRACT: Chronic | ICD-10-CM

## 2020-01-22 DIAGNOSIS — C50.911 MALIGNANT NEOPLASM OF UNSPECIFIED SITE OF RIGHT FEMALE BREAST: ICD-10-CM

## 2020-01-22 DIAGNOSIS — Z98.891 HISTORY OF UTERINE SCAR FROM PREVIOUS SURGERY: Chronic | ICD-10-CM

## 2020-01-22 DIAGNOSIS — C79.51 SECONDARY MALIGNANT NEOPLASM OF BONE: ICD-10-CM

## 2020-01-23 ENCOUNTER — RESULT REVIEW (OUTPATIENT)
Age: 55
End: 2020-01-23

## 2020-01-23 ENCOUNTER — APPOINTMENT (OUTPATIENT)
Dept: HEMATOLOGY ONCOLOGY | Facility: CLINIC | Age: 55
End: 2020-01-23
Payer: MEDICARE

## 2020-01-23 VITALS
SYSTOLIC BLOOD PRESSURE: 108 MMHG | TEMPERATURE: 98.2 F | OXYGEN SATURATION: 99 % | BODY MASS INDEX: 34.26 KG/M2 | DIASTOLIC BLOOD PRESSURE: 74 MMHG | WEIGHT: 218.25 LBS | HEART RATE: 88 BPM | RESPIRATION RATE: 16 BRPM

## 2020-01-23 LAB
ALBUMIN SERPL ELPH-MCNC: 4.7 G/DL
ALP BLD-CCNC: 57 U/L
ALT SERPL-CCNC: 58 U/L
ANION GAP SERPL CALC-SCNC: 14 MMOL/L
AST SERPL-CCNC: 35 U/L
BASOPHILS # BLD AUTO: 0.1 K/UL — SIGNIFICANT CHANGE UP (ref 0–0.2)
BASOPHILS NFR BLD AUTO: 2.1 % — HIGH (ref 0–2)
BILIRUB SERPL-MCNC: 0.4 MG/DL
BUN SERPL-MCNC: 23 MG/DL
CALCIUM SERPL-MCNC: 9.5 MG/DL
CEA SERPL-MCNC: 1.4 NG/ML
CHLORIDE SERPL-SCNC: 104 MMOL/L
CO2 SERPL-SCNC: 23 MMOL/L
CREAT SERPL-MCNC: 0.88 MG/DL
EOSINOPHIL # BLD AUTO: 0 K/UL — SIGNIFICANT CHANGE UP (ref 0–0.5)
EOSINOPHIL NFR BLD AUTO: 1.4 % — SIGNIFICANT CHANGE UP (ref 0–6)
GLUCOSE SERPL-MCNC: 98 MG/DL
HCT VFR BLD CALC: 41.3 % — SIGNIFICANT CHANGE UP (ref 34.5–45)
HGB BLD-MCNC: 14.4 G/DL — SIGNIFICANT CHANGE UP (ref 11.5–15.5)
LYMPHOCYTES # BLD AUTO: 1.2 K/UL — SIGNIFICANT CHANGE UP (ref 1–3.3)
LYMPHOCYTES # BLD AUTO: 37.8 % — SIGNIFICANT CHANGE UP (ref 13–44)
MCHC RBC-ENTMCNC: 35 G/DL — SIGNIFICANT CHANGE UP (ref 32–36)
MCHC RBC-ENTMCNC: 35.7 PG — HIGH (ref 27–34)
MCV RBC AUTO: 102 FL — HIGH (ref 80–100)
MONOCYTES # BLD AUTO: 0.4 K/UL — SIGNIFICANT CHANGE UP (ref 0–0.9)
MONOCYTES NFR BLD AUTO: 12.2 % — SIGNIFICANT CHANGE UP (ref 2–14)
NEUTROPHILS # BLD AUTO: 1.5 K/UL — LOW (ref 1.8–7.4)
NEUTROPHILS NFR BLD AUTO: 46.6 % — SIGNIFICANT CHANGE UP (ref 43–77)
PLATELET # BLD AUTO: 141 K/UL — LOW (ref 150–400)
POTASSIUM SERPL-SCNC: 4.8 MMOL/L
PROT SERPL-MCNC: 6.9 G/DL
RBC # BLD: 4.04 M/UL — SIGNIFICANT CHANGE UP (ref 3.8–5.2)
RBC # FLD: 11.6 % — SIGNIFICANT CHANGE UP (ref 10.3–14.5)
SODIUM SERPL-SCNC: 140 MMOL/L
WBC # BLD: 3.2 K/UL — LOW (ref 3.8–10.5)
WBC # FLD AUTO: 3.2 K/UL — LOW (ref 3.8–10.5)

## 2020-01-23 PROCEDURE — 99215 OFFICE O/P EST HI 40 MIN: CPT

## 2020-01-25 NOTE — HISTORY OF PRESENT ILLNESS
[Disease: _____________________] : Disease: [unfilled] [T: ___] : T[unfilled] [N: ___] : N[unfilled] [AJCC Stage: ____] : AJCC Stage: [unfilled] [M: ___] : M[unfilled] [de-identified] : Nasrin continues to do well on letrozole and Ibrance which she has been on since 7/17. \par She still experiences stiffness and achiness in her joints in the mornings, but this is better with addition of Claritin and trying to stay active. \par The hot flashes are getting better. She is no longer getting any mouth sores and so she d/c the dexamethasone elixir.\par She is overall doing well and feeling full of energy.\par She has been following a ketogenic diet on and off for several months and has lost nearly 30 lbs.\par Plan to do genetic testing at her next visit as she is concerned for her daughters and this could also be relevant for her treatment if she is BRCA positive.\par \par 7/16/19 CT Scan showed stable examination. Sclerotic osseous metastatic disease without significant change.\par 7/16/19 Bone scan showed no significant interval change compared to the previous bone scan of 7/11/18, showing resolved or less prominent metastatic lesions from 6/27/17. No new osseous lesions are identified. 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 Stable radiographic distribution of sclerotic metastases. \par CT scan from 11/2/18: Stable with no new findings\par CT scan from 7/11/18: Stable with no new findings\par Bone scan 7/11/18 stable to improved with no new areas of disease\par CT scan from 4/9/18: Stable osseous metastases and no new findings.\par CT scan from 1/16/18: Stable osseous metastases. No radiographic evidence of visceral metastasis with a stable 2 cm indeterminate left adrenal nodule\par CT scan from 11/11/2019 Stable osseous metastases, no new areas of disease\par \par \par \par \par \par  [de-identified] : Right breast cancer diagnosed at the age of 52\par 11/14 Presented with right breast mastitis and treated with antibiotics\par Multiple recurrence of mastitis over next two years, 3-4 per year with increased frequency in 2016-17\par 6/13/16 She had a 3 mm punch biopsy of the right breast which revealed interstitial neutrophilic infiltrate\par 04/25/17 Bilateral breast mammogram showed no significant interval change in the appearance of the diffuse breast edema and diffuse skin thickening in the right breast with no specific evidence of adenopathy or underlying abnormality. New skin thickening and increase parenchymal density in the left LIQ. Finding are indeterminate and biopsy was suggested. Bilateral breast ultrasound showed diffuse right sided skin thickening with focal left medial new skin thickening.\par 06/02/17 Diagnostic mammogram revealed signs of breast edema with skin thickening, progressive since prior studies. D/DX cellulitis vs. mastitis vs. inflammatory breast cancer.\par 06/03/17 Bilateral breast ultrasound again showed nonspecific and recurrent signs of inflammation in the right breast, consisting of skin thickening and breast edema. Patient reported having pain, erythema, fever and shaking chills, slightly better with antibiotics.\par 06/22/17 Right central breast, sonoguided core biopsy showed  invasive ductal carcinoma, SBR 6/9, ER 95%, [place] 30% and HER 2 negative and DCIS solid pattern with intermediate grade nuclear atypia.\par 06/26/17 MRI of the breast showed a recently diagnosed right breast malignancy, with diffuse skin thickening and asymmetric parenchymal enhancement. A 5 mm enhancing nodule  in the right upper outer breast, possibly representing intramammary lymph node. No suspicious findings in the left breast.\par 06/27/17 CT scan of C/A/P showed an irregular right sided breast thickening compatible with biopsy proven right breast cancer. Osseous metastases involving the T10, T11 and L4 vertebral bodies. Indeterminate 2 cm left adrenal nodule.\par 06/27/17 Bone scan showed abnormal bone scan compatible with multifocal osseous metastases in the thoracolumbar spine, left iliac bone and in the right acetabulum.\par 7/14/17 BSO \par 7/21/17 Started letrozole and Ibrance  [de-identified] : Invasive ductal carcinoma, ER 95%, [place] 30% and HER 2 negative

## 2020-01-25 NOTE — PHYSICAL EXAM
[Fully active, able to carry on all pre-disease performance without restriction] : Status 0 - Fully active, able to carry on all pre-disease performance without restriction [Normal] : grossly intact [de-identified] : Right breast with decreased nipple retraction , no erythema or significant swelling as previously noted.

## 2020-01-28 LAB — CANCER AG27-29 SERPL-ACNC: 12.8 U/ML

## 2020-03-10 ENCOUNTER — FORM ENCOUNTER (OUTPATIENT)
Age: 55
End: 2020-03-10

## 2020-03-11 ENCOUNTER — OUTPATIENT (OUTPATIENT)
Dept: OUTPATIENT SERVICES | Facility: HOSPITAL | Age: 55
LOS: 1 days | End: 2020-03-11
Payer: MEDICARE

## 2020-03-11 ENCOUNTER — APPOINTMENT (OUTPATIENT)
Dept: CT IMAGING | Facility: CLINIC | Age: 55
End: 2020-03-11
Payer: MEDICARE

## 2020-03-11 ENCOUNTER — OUTPATIENT (OUTPATIENT)
Dept: OUTPATIENT SERVICES | Facility: HOSPITAL | Age: 55
LOS: 1 days | Discharge: HOME | End: 2020-03-11

## 2020-03-11 DIAGNOSIS — C50.911 MALIGNANT NEOPLASM OF UNSPECIFIED SITE OF RIGHT FEMALE BREAST: ICD-10-CM

## 2020-03-11 DIAGNOSIS — Z98.891 HISTORY OF UTERINE SCAR FROM PREVIOUS SURGERY: Chronic | ICD-10-CM

## 2020-03-11 DIAGNOSIS — C79.51 SECONDARY MALIGNANT NEOPLASM OF BONE: ICD-10-CM

## 2020-03-11 DIAGNOSIS — Z90.49 ACQUIRED ABSENCE OF OTHER SPECIFIED PARTS OF DIGESTIVE TRACT: Chronic | ICD-10-CM

## 2020-03-11 PROCEDURE — 74177 CT ABD & PELVIS W/CONTRAST: CPT | Mod: 26

## 2020-03-11 PROCEDURE — 74177 CT ABD & PELVIS W/CONTRAST: CPT

## 2020-03-11 PROCEDURE — 71260 CT THORAX DX C+: CPT | Mod: 26

## 2020-03-11 PROCEDURE — 71260 CT THORAX DX C+: CPT

## 2020-03-17 ENCOUNTER — APPOINTMENT (OUTPATIENT)
Dept: HEMATOLOGY ONCOLOGY | Facility: CLINIC | Age: 55
End: 2020-03-17

## 2020-03-17 ENCOUNTER — APPOINTMENT (OUTPATIENT)
Dept: INFUSION THERAPY | Facility: HOSPITAL | Age: 55
End: 2020-03-17

## 2020-05-07 ENCOUNTER — OUTPATIENT (OUTPATIENT)
Dept: OUTPATIENT SERVICES | Facility: HOSPITAL | Age: 55
LOS: 1 days | Discharge: HOME | End: 2020-05-07

## 2020-05-07 DIAGNOSIS — Z90.49 ACQUIRED ABSENCE OF OTHER SPECIFIED PARTS OF DIGESTIVE TRACT: Chronic | ICD-10-CM

## 2020-05-07 DIAGNOSIS — Z98.891 HISTORY OF UTERINE SCAR FROM PREVIOUS SURGERY: Chronic | ICD-10-CM

## 2020-05-07 DIAGNOSIS — C50.911 MALIGNANT NEOPLASM OF UNSPECIFIED SITE OF RIGHT FEMALE BREAST: ICD-10-CM

## 2020-05-12 ENCOUNTER — APPOINTMENT (OUTPATIENT)
Dept: HEMATOLOGY ONCOLOGY | Facility: CLINIC | Age: 55
End: 2020-05-12
Payer: MEDICARE

## 2020-05-12 PROCEDURE — 99215 OFFICE O/P EST HI 40 MIN: CPT | Mod: 95

## 2020-05-12 NOTE — HISTORY OF PRESENT ILLNESS
[T: ___] : T[unfilled] [Disease: _____________________] : Disease: [unfilled] [M: ___] : M[unfilled] [N: ___] : N[unfilled] [AJCC Stage: ____] : AJCC Stage: [unfilled] [de-identified] : Right breast cancer diagnosed at the age of 52\par 11/14 Presented with right breast mastitis and treated with antibiotics\par Multiple recurrence of mastitis over next two years, 3-4 per year with increased frequency in 2016-17\par 6/13/16 She had a 3 mm punch biopsy of the right breast which revealed interstitial neutrophilic infiltrate\par 04/25/17 Bilateral breast mammogram showed no significant interval change in the appearance of the diffuse breast edema and diffuse skin thickening in the right breast with no specific evidence of adenopathy or underlying abnormality. New skin thickening and increase parenchymal density in the left LIQ. Finding are indeterminate and biopsy was suggested. Bilateral breast ultrasound showed diffuse right sided skin thickening with focal left medial new skin thickening.\par 06/02/17 Diagnostic mammogram revealed signs of breast edema with skin thickening, progressive since prior studies. D/DX cellulitis vs. mastitis vs. inflammatory breast cancer.\par 06/03/17 Bilateral breast ultrasound again showed nonspecific and recurrent signs of inflammation in the right breast, consisting of skin thickening and breast edema. Patient reported having pain, erythema, fever and shaking chills, slightly better with antibiotics.\par 06/22/17 Right central breast, sonoguided core biopsy showed  invasive ductal carcinoma, SBR 6/9, ER 95%, OR 30% and HER 2 negative and DCIS solid pattern with intermediate grade nuclear atypia.\par 06/26/17 MRI of the breast showed a recently diagnosed right breast malignancy, with diffuse skin thickening and asymmetric parenchymal enhancement. A 5 mm enhancing nodule  in the right upper outer breast, possibly representing intramammary lymph node. No suspicious findings in the left breast.\par 06/27/17 CT scan of C/A/P showed an irregular right sided breast thickening compatible with biopsy proven right breast cancer. Osseous metastases involving the T10, T11 and L4 vertebral bodies. Indeterminate 2 cm left adrenal nodule.\par 06/27/17 Bone scan showed abnormal bone scan compatible with multifocal osseous metastases in the thoracolumbar spine, left iliac bone and in the right acetabulum.\par 7/14/17 BSO \par 7/21/17 Started letrozole and Ibrance  [de-identified] : Invasive ductal carcinoma, ER 95%, DE 30% and HER 2 negative [de-identified] : Nasrin is seen today for a virtual follow up visit, in light of the COVID-19 pandemic crisis\par She continues to do well on letrozole and Ibrance which she has been on since 7/17. \par She very infrequently gets mouth sores for which had been using Dexamethasone elixir a few month ago with no recurrences until this month.\par The mouth sores are in clusters and not as painful as in the past. She has not resume dexamethasone, but has been watching what she is eating so as to not aggravate it further.  She still experiences stiffness and achiness in her joints in the mornings, but this is better with addition of Claritin and trying to stay active, which has been difficulty with the "Stay at home" policy due to COVID-19. The hot flashes are getting better. \par She is overall doing well and feeling full of energy.\par She has stopped with the ketogenic diet for few months and has not had any weight gain, which she is very happy about .\par Plan to do genetic testing tomorrow after her Xgeva injection as she is concerned for her daughters and this could also be relevant for her treatment if she is BRCA positive.\par \par 3/11/20 CT scan showed Osseous metastatic disease, unchanged. No new foci of metastatic disease.\par 7/16/19 CT Scan showed stable examination. Sclerotic osseous metastatic disease without significant change.\par 7/16/19 Bone scan showed no significant interval change compared to the previous bone scan of 7/11/18, showing resolved or less prominent metastatic lesions from 6/27/17. No new osseous lesions are identified. 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 Stable radiographic distribution of sclerotic metastases. \par CT scan from 11/2/18: Stable with no new findings\par CT scan from 7/11/18: Stable with no new findings\par Bone scan 7/11/18 stable to improved with no new areas of disease\par CT scan from 4/9/18: Stable osseous metastases and no new findings.\par CT scan from 1/16/18: Stable osseous metastases. No radiographic evidence of visceral metastasis with a stable 2 cm indeterminate left adrenal nodule\par CT scan from 11/11/2019 Stable osseous metastases, no new areas of disease\par \par \par \par \par \par  Breath sounds clear and equal bilaterally.

## 2020-05-12 NOTE — REASON FOR VISIT
[Follow-Up Visit] : a follow-up [Home] : at home, [unfilled] , at the time of the visit. [Medical Office: ([facility])___] : at the medical office located in  [Patient] : the patient [Self] : self [FreeTextEntry2] : Metastatic breast cancer

## 2020-05-28 NOTE — OB HISTORY
[Menarche Age: ____] : age at menarche was [unfilled] [Definite:  ___ (Date)] : the last menstrual period was [unfilled] [___] : Full Term: [unfilled]

## 2020-05-29 ENCOUNTER — APPOINTMENT (OUTPATIENT)
Dept: HEMATOLOGY ONCOLOGY | Facility: CLINIC | Age: 55
End: 2020-05-29
Payer: MEDICARE

## 2020-05-29 ENCOUNTER — RESULT REVIEW (OUTPATIENT)
Age: 55
End: 2020-05-29

## 2020-05-29 ENCOUNTER — APPOINTMENT (OUTPATIENT)
Dept: INFUSION THERAPY | Facility: HOSPITAL | Age: 55
End: 2020-05-29

## 2020-05-29 VITALS
TEMPERATURE: 97.9 F | WEIGHT: 219.58 LBS | RESPIRATION RATE: 17 BRPM | HEART RATE: 100 BPM | SYSTOLIC BLOOD PRESSURE: 128 MMHG | BODY MASS INDEX: 34.46 KG/M2 | DIASTOLIC BLOOD PRESSURE: 86 MMHG | OXYGEN SATURATION: 94 %

## 2020-05-29 DIAGNOSIS — Z80.52 FAMILY HISTORY OF MALIGNANT NEOPLASM OF BLADDER: ICD-10-CM

## 2020-05-29 DIAGNOSIS — Z80.42 FAMILY HISTORY OF MALIGNANT NEOPLASM OF PROSTATE: ICD-10-CM

## 2020-05-29 DIAGNOSIS — Z80.51 FAMILY HISTORY OF MALIGNANT NEOPLASM OF KIDNEY: ICD-10-CM

## 2020-05-29 LAB
BASOPHILS # BLD AUTO: 0 K/UL — SIGNIFICANT CHANGE UP (ref 0–0.2)
BASOPHILS NFR BLD AUTO: 0 % — SIGNIFICANT CHANGE UP (ref 0–2)
EOSINOPHIL # BLD AUTO: 0.08 K/UL — SIGNIFICANT CHANGE UP (ref 0–0.5)
EOSINOPHIL NFR BLD AUTO: 3 % — SIGNIFICANT CHANGE UP (ref 0–6)
HCT VFR BLD CALC: 39 % — SIGNIFICANT CHANGE UP (ref 34.5–45)
HGB BLD-MCNC: 13.4 G/DL — SIGNIFICANT CHANGE UP (ref 11.5–15.5)
LYMPHOCYTES # BLD AUTO: 0.84 K/UL — LOW (ref 1–3.3)
LYMPHOCYTES # BLD AUTO: 32 % — SIGNIFICANT CHANGE UP (ref 13–44)
MCHC RBC-ENTMCNC: 34.4 GM/DL — SIGNIFICANT CHANGE UP (ref 32–36)
MCHC RBC-ENTMCNC: 35.1 PG — HIGH (ref 27–34)
MCV RBC AUTO: 102.1 FL — HIGH (ref 80–100)
MONOCYTES # BLD AUTO: 0.26 K/UL — SIGNIFICANT CHANGE UP (ref 0–0.9)
MONOCYTES NFR BLD AUTO: 10 % — SIGNIFICANT CHANGE UP (ref 2–14)
NEUTROPHILS # BLD AUTO: 1.44 K/UL — LOW (ref 1.8–7.4)
NEUTROPHILS NFR BLD AUTO: 55 % — SIGNIFICANT CHANGE UP (ref 43–77)
NRBC # BLD: 0 /100 — SIGNIFICANT CHANGE UP (ref 0–0)
NRBC # BLD: SIGNIFICANT CHANGE UP /100 WBCS (ref 0–0)
PLAT MORPH BLD: NORMAL — SIGNIFICANT CHANGE UP
PLATELET # BLD AUTO: 351 K/UL — SIGNIFICANT CHANGE UP (ref 150–400)
RBC # BLD: 3.82 M/UL — SIGNIFICANT CHANGE UP (ref 3.8–5.2)
RBC # FLD: 12.5 % — SIGNIFICANT CHANGE UP (ref 10.3–14.5)
RBC BLD AUTO: SIGNIFICANT CHANGE UP
WBC # BLD: 2.62 K/UL — LOW (ref 3.8–10.5)
WBC # FLD AUTO: 2.62 K/UL — LOW (ref 3.8–10.5)

## 2020-05-29 PROCEDURE — 99215 OFFICE O/P EST HI 40 MIN: CPT

## 2020-06-01 DIAGNOSIS — C79.51 SECONDARY MALIGNANT NEOPLASM OF BONE: ICD-10-CM

## 2020-06-01 PROBLEM — Z80.52 FAMILY HISTORY OF MALIGNANT NEOPLASM OF URINARY BLADDER: Status: ACTIVE | Noted: 2017-06-30

## 2020-06-01 PROBLEM — Z80.51 FAMILY HISTORY OF MALIGNANT NEOPLASM OF KIDNEY: Status: ACTIVE | Noted: 2018-05-09

## 2020-06-01 PROBLEM — Z80.42 FAMILY HISTORY OF MALIGNANT NEOPLASM OF PROSTATE: Status: ACTIVE | Noted: 2018-05-09

## 2020-06-01 LAB
ALBUMIN SERPL ELPH-MCNC: 4.6 G/DL
ALP BLD-CCNC: 60 U/L
ALT SERPL-CCNC: 20 U/L
ANION GAP SERPL CALC-SCNC: 13 MMOL/L
AST SERPL-CCNC: 21 U/L
BILIRUB SERPL-MCNC: 0.3 MG/DL
BUN SERPL-MCNC: 22 MG/DL
CALCIUM SERPL-MCNC: 9.6 MG/DL
CANCER AG27-29 SERPL-ACNC: 13.1 U/ML
CEA SERPL-MCNC: 1.7 NG/ML
CHLORIDE SERPL-SCNC: 104 MMOL/L
CO2 SERPL-SCNC: 27 MMOL/L
CREAT SERPL-MCNC: 1.12 MG/DL
GLUCOSE SERPL-MCNC: 97 MG/DL
POTASSIUM SERPL-SCNC: 4.6 MMOL/L
PROT SERPL-MCNC: 6.8 G/DL
SARS-COV-2 IGG SERPL IA-ACNC: 0.1 INDEX
SARS-COV-2 IGG SERPL QL IA: NEGATIVE
SODIUM SERPL-SCNC: 144 MMOL/L

## 2020-06-01 NOTE — HISTORY OF PRESENT ILLNESS
[Disease: _____________________] : Disease: [unfilled] [T: ___] : T[unfilled] [N: ___] : N[unfilled] [M: ___] : M[unfilled] [AJCC Stage: ____] : AJCC Stage: [unfilled] [de-identified] : Right breast cancer diagnosed at the age of 52\par 11/14 Presented with right breast mastitis and treated with antibiotics\par Multiple recurrence of mastitis over next two years, 3-4 per year with increased frequency in 2016-17\par 6/13/16 She had a 3 mm punch biopsy of the right breast which revealed interstitial neutrophilic infiltrate\par 04/25/17 Bilateral breast mammogram showed no significant interval change in the appearance of the diffuse breast edema and diffuse skin thickening in the right breast with no specific evidence of adenopathy or underlying abnormality. New skin thickening and increase parenchymal density in the left LIQ. Finding are indeterminate and biopsy was suggested. Bilateral breast ultrasound showed diffuse right sided skin thickening with focal left medial new skin thickening.\par 06/02/17 Diagnostic mammogram revealed signs of breast edema with skin thickening, progressive since prior studies. D/DX cellulitis vs. mastitis vs. inflammatory breast cancer.\par 06/03/17 Bilateral breast ultrasound again showed nonspecific and recurrent signs of inflammation in the right breast, consisting of skin thickening and breast edema. Patient reported having pain, erythema, fever and shaking chills, slightly better with antibiotics.\par 06/22/17 Right central breast, sonoguided core biopsy showed  invasive ductal carcinoma, SBR 6/9, ER 95%, CO 30% and HER 2 negative and DCIS solid pattern with intermediate grade nuclear atypia.\par 06/26/17 MRI of the breast showed a recently diagnosed right breast malignancy, with diffuse skin thickening and asymmetric parenchymal enhancement. A 5 mm enhancing nodule  in the right upper outer breast, possibly representing intramammary lymph node. No suspicious findings in the left breast.\par 06/27/17 CT scan of C/A/P showed an irregular right sided breast thickening compatible with biopsy proven right breast cancer. Osseous metastases involving the T10, T11 and L4 vertebral bodies. Indeterminate 2 cm left adrenal nodule.\par 06/27/17 Bone scan showed abnormal bone scan compatible with multifocal osseous metastases in the thoracolumbar spine, left iliac bone and in the right acetabulum.\par 7/14/17 BSO \par 7/21/17 Started letrozole and Ibrance \par 7/2017 Xgeva started every 3 months and then decreased to every 6 months due to good cancer control [de-identified] : Nasrin is here today for Xgeva and genetic testing. She is overall doing well with good energy.\par She continues to do well on letrozole and Ibrance which she has been on since 7/17. She does get mouth sores on occasion but they are infrequent and manageable.\par She still experiences stiffness and achiness in her joints in the mornings, but this is better with addition of Claritin and when she is active. The hot flashes are getting better. \par We had previously discussed doing genetic testing as this could be relevant for the management of her disease (additional treatment options such as PARP inhibitors if BRCA positive) as well as important for her 4 daughters.  Her paternal grandmother had breast cancer in her 80s and her father had prostate cancer at age 60, bladder cancer at age 67 and renal cell carcinoma at age 75.\par \par 3/11/20 CT scan showed Osseous metastatic disease, unchanged. No new foci of metastatic disease.\par 7/16/19 CT Scan showed stable examination. Sclerotic osseous metastatic disease without significant change.\par 7/16/19 Bone scan showed no significant interval change compared to the previous bone scan of 7/11/18, showing resolved or less prominent metastatic lesions from 6/27/17. No new osseous lesions are identified. 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 Stable radiographic distribution of sclerotic metastases. \par CT scan from 11/2/18: Stable with no new findings\par CT scan from 7/11/18: Stable with no new findings\par Bone scan 7/11/18 stable to improved with no new areas of disease\par CT scan from 4/9/18: Stable osseous metastases and no new findings.\par CT scan from 1/16/18: Stable osseous metastases. No radiographic evidence of visceral metastasis with a stable 2 cm indeterminate left adrenal nodule\par CT scan from 11/11/2019 Stable osseous metastases, no new areas of disease\par \par \par \par \par \par  [de-identified] : Invasive ductal carcinoma, ER 95%, SD 30% and HER 2 negative

## 2020-06-10 LAB
MISCELLANEOUS TEST: NORMAL
PROC NAME: NORMAL

## 2020-06-11 ENCOUNTER — RX RENEWAL (OUTPATIENT)
Age: 55
End: 2020-06-11

## 2020-07-09 ENCOUNTER — RESULT REVIEW (OUTPATIENT)
Age: 55
End: 2020-07-09

## 2020-07-09 ENCOUNTER — APPOINTMENT (OUTPATIENT)
Dept: CT IMAGING | Facility: IMAGING CENTER | Age: 55
End: 2020-07-09
Payer: MEDICARE

## 2020-07-09 ENCOUNTER — APPOINTMENT (OUTPATIENT)
Dept: NUCLEAR MEDICINE | Facility: IMAGING CENTER | Age: 55
End: 2020-07-09
Payer: MEDICARE

## 2020-07-09 ENCOUNTER — OUTPATIENT (OUTPATIENT)
Dept: OUTPATIENT SERVICES | Facility: HOSPITAL | Age: 55
LOS: 1 days | End: 2020-07-09
Payer: MEDICARE

## 2020-07-09 DIAGNOSIS — Z98.891 HISTORY OF UTERINE SCAR FROM PREVIOUS SURGERY: Chronic | ICD-10-CM

## 2020-07-09 DIAGNOSIS — Z90.49 ACQUIRED ABSENCE OF OTHER SPECIFIED PARTS OF DIGESTIVE TRACT: Chronic | ICD-10-CM

## 2020-07-09 DIAGNOSIS — C50.911 MALIGNANT NEOPLASM OF UNSPECIFIED SITE OF RIGHT FEMALE BREAST: ICD-10-CM

## 2020-07-09 PROCEDURE — 78306 BONE IMAGING WHOLE BODY: CPT | Mod: 26

## 2020-07-09 PROCEDURE — 78830 RP LOCLZJ TUM SPECT W/CT 1: CPT | Mod: 26

## 2020-07-09 PROCEDURE — 74177 CT ABD & PELVIS W/CONTRAST: CPT

## 2020-07-09 PROCEDURE — 74177 CT ABD & PELVIS W/CONTRAST: CPT | Mod: 26

## 2020-07-09 PROCEDURE — 71260 CT THORAX DX C+: CPT | Mod: 26

## 2020-07-09 PROCEDURE — A9561: CPT

## 2020-07-09 PROCEDURE — 78830 RP LOCLZJ TUM SPECT W/CT 1: CPT

## 2020-07-09 PROCEDURE — 78306 BONE IMAGING WHOLE BODY: CPT

## 2020-07-09 PROCEDURE — 71260 CT THORAX DX C+: CPT

## 2020-07-10 ENCOUNTER — OUTPATIENT (OUTPATIENT)
Dept: OUTPATIENT SERVICES | Facility: HOSPITAL | Age: 55
LOS: 1 days | Discharge: HOME | End: 2020-07-10

## 2020-07-10 DIAGNOSIS — C50.911 MALIGNANT NEOPLASM OF UNSPECIFIED SITE OF RIGHT FEMALE BREAST: ICD-10-CM

## 2020-07-10 DIAGNOSIS — C79.51 SECONDARY MALIGNANT NEOPLASM OF BONE: ICD-10-CM

## 2020-07-10 DIAGNOSIS — Z98.891 HISTORY OF UTERINE SCAR FROM PREVIOUS SURGERY: Chronic | ICD-10-CM

## 2020-07-10 DIAGNOSIS — Z90.49 ACQUIRED ABSENCE OF OTHER SPECIFIED PARTS OF DIGESTIVE TRACT: Chronic | ICD-10-CM

## 2020-07-14 ENCOUNTER — RESULT REVIEW (OUTPATIENT)
Age: 55
End: 2020-07-14

## 2020-07-14 ENCOUNTER — APPOINTMENT (OUTPATIENT)
Dept: HEMATOLOGY ONCOLOGY | Facility: CLINIC | Age: 55
End: 2020-07-14
Payer: MEDICARE

## 2020-07-14 VITALS
DIASTOLIC BLOOD PRESSURE: 86 MMHG | SYSTOLIC BLOOD PRESSURE: 124 MMHG | HEART RATE: 98 BPM | WEIGHT: 220.24 LBS | OXYGEN SATURATION: 99 % | BODY MASS INDEX: 34.57 KG/M2 | TEMPERATURE: 99 F | RESPIRATION RATE: 18 BRPM

## 2020-07-14 LAB
BASOPHILS # BLD AUTO: 0.07 K/UL — SIGNIFICANT CHANGE UP (ref 0–0.2)
BASOPHILS NFR BLD AUTO: 1.9 % — SIGNIFICANT CHANGE UP (ref 0–2)
EOSINOPHIL # BLD AUTO: 0.06 K/UL — SIGNIFICANT CHANGE UP (ref 0–0.5)
EOSINOPHIL NFR BLD AUTO: 1.6 % — SIGNIFICANT CHANGE UP (ref 0–6)
HCT VFR BLD CALC: 37 % — SIGNIFICANT CHANGE UP (ref 34.5–45)
HGB BLD-MCNC: 12.8 G/DL — SIGNIFICANT CHANGE UP (ref 11.5–15.5)
IMM GRANULOCYTES NFR BLD AUTO: 2.4 % — HIGH (ref 0–1.5)
LYMPHOCYTES # BLD AUTO: 1.12 K/UL — SIGNIFICANT CHANGE UP (ref 1–3.3)
LYMPHOCYTES # BLD AUTO: 30.3 % — SIGNIFICANT CHANGE UP (ref 13–44)
MCHC RBC-ENTMCNC: 34.6 GM/DL — SIGNIFICANT CHANGE UP (ref 32–36)
MCHC RBC-ENTMCNC: 34.6 PG — HIGH (ref 27–34)
MCV RBC AUTO: 100 FL — SIGNIFICANT CHANGE UP (ref 80–100)
MONOCYTES # BLD AUTO: 0.35 K/UL — SIGNIFICANT CHANGE UP (ref 0–0.9)
MONOCYTES NFR BLD AUTO: 9.5 % — SIGNIFICANT CHANGE UP (ref 2–14)
NEUTROPHILS # BLD AUTO: 2.01 K/UL — SIGNIFICANT CHANGE UP (ref 1.8–7.4)
NEUTROPHILS NFR BLD AUTO: 54.3 % — SIGNIFICANT CHANGE UP (ref 43–77)
NRBC # BLD: 0 /100 WBCS — SIGNIFICANT CHANGE UP (ref 0–0)
PLATELET # BLD AUTO: 165 K/UL — SIGNIFICANT CHANGE UP (ref 150–400)
RBC # BLD: 3.7 M/UL — LOW (ref 3.8–5.2)
RBC # FLD: 12.1 % — SIGNIFICANT CHANGE UP (ref 10.3–14.5)
WBC # BLD: 3.7 K/UL — LOW (ref 3.8–10.5)
WBC # FLD AUTO: 3.7 K/UL — LOW (ref 3.8–10.5)

## 2020-07-14 PROCEDURE — 99215 OFFICE O/P EST HI 40 MIN: CPT

## 2020-07-14 NOTE — HISTORY OF PRESENT ILLNESS
[Disease: _____________________] : Disease: [unfilled] [T: ___] : T[unfilled] [M: ___] : M[unfilled] [N: ___] : N[unfilled] [AJCC Stage: ____] : AJCC Stage: [unfilled] [de-identified] : Right breast cancer diagnosed at the age of 52\par 11/14 Presented with right breast mastitis and treated with antibiotics\par Multiple recurrence of mastitis over next two years, 3-4 per year with increased frequency in 2016-17\par 6/13/16 She had a 3 mm punch biopsy of the right breast which revealed interstitial neutrophilic infiltrate\par 04/25/17 Bilateral breast mammogram showed no significant interval change in the appearance of the diffuse breast edema and diffuse skin thickening in the right breast with no specific evidence of adenopathy or underlying abnormality. New skin thickening and increase parenchymal density in the left LIQ. Finding are indeterminate and biopsy was suggested. Bilateral breast ultrasound showed diffuse right sided skin thickening with focal left medial new skin thickening.\par 06/02/17 Diagnostic mammogram revealed signs of breast edema with skin thickening, progressive since prior studies. D/DX cellulitis vs. mastitis vs. inflammatory breast cancer.\par 06/03/17 Bilateral breast ultrasound again showed nonspecific and recurrent signs of inflammation in the right breast, consisting of skin thickening and breast edema. Patient reported having pain, erythema, fever and shaking chills, slightly better with antibiotics.\par 06/22/17 Right central breast, sonoguided core biopsy showed  invasive ductal carcinoma, SBR 6/9, ER 95%, MI 30% and HER 2 negative and DCIS solid pattern with intermediate grade nuclear atypia.\par 06/26/17 MRI of the breast showed a recently diagnosed right breast malignancy, with diffuse skin thickening and asymmetric parenchymal enhancement. A 5 mm enhancing nodule  in the right upper outer breast, possibly representing intramammary lymph node. No suspicious findings in the left breast.\par 06/27/17 CT scan of C/A/P showed an irregular right sided breast thickening compatible with biopsy proven right breast cancer. Osseous metastases involving the T10, T11 and L4 vertebral bodies. Indeterminate 2 cm left adrenal nodule.\par 06/27/17 Bone scan showed abnormal bone scan compatible with multifocal osseous metastases in the thoracolumbar spine, left iliac bone and in the right acetabulum.\par 7/14/17 BSO \par 7/21/17 Started letrozole and Ibrance \par 7/2017 Xgeva started every 3 months and then decreased to every 6 months due to good cancer control [de-identified] : Invasive ductal carcinoma, ER 95%, DE 30% and HER 2 negative [de-identified] : Nasrin continues to do well on letrozole and Ibrance which she has been on since 7/17 with occasional mouth sores, mild arthralgias and mild hot flashes.\par However last week she developed pain in her right breast and then fever to 103 so started taking Keflex which had been given to her previously.  The pain and fever have resolved and the erythema is starting to improve.\par Scans done last week did not show any evidence of new or worsening disease.\par \par 07/10/20 Bone scan showed osseous metastatic disease not significantly changed. Degenerative disease in the major joints. No new osseous lesions.\par 07/10/20 CT scan showed Stable exam. Sclerotic osseous metastases unchanged\par 3/11/20 CT scan showed Osseous metastatic disease, unchanged. No new foci of metastatic disease.\par 7/16/19 CT Scan showed stable examination. Sclerotic osseous metastatic disease without significant change.\par 7/16/19 Bone scan showed no significant interval change compared to the previous bone scan of 7/11/18, showing resolved or less prominent metastatic lesions from 6/27/17. No new osseous lesions are identified. 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 Stable radiographic distribution of sclerotic metastases. \par CT scan from 11/2/18: Stable with no new findings\par CT scan from 7/11/18: Stable with no new findings\par Bone scan 7/11/18 stable to improved with no new areas of disease\par CT scan from 4/9/18: Stable osseous metastases and no new findings.\par CT scan from 1/16/18: Stable osseous metastases. No radiographic evidence of visceral metastasis with a stable 2 cm indeterminate left adrenal nodule\par CT scan from 11/11/2019 Stable osseous metastases, no new areas of disease\par \par \par \par \par \par

## 2020-07-14 NOTE — PHYSICAL EXAM
[Fully active, able to carry on all pre-disease performance without restriction] : Status 0 - Fully active, able to carry on all pre-disease performance without restriction [Normal] : affect appropriate [de-identified] : Mild erythema and edema of right breast but no new masses

## 2020-07-15 LAB
ALBUMIN SERPL ELPH-MCNC: 4.2 G/DL
ALP BLD-CCNC: 52 U/L
ALT SERPL-CCNC: 33 U/L
ANION GAP SERPL CALC-SCNC: 13 MMOL/L
AST SERPL-CCNC: 22 U/L
BILIRUB SERPL-MCNC: 0.3 MG/DL
BUN SERPL-MCNC: 22 MG/DL
CALCIUM SERPL-MCNC: 9.2 MG/DL
CANCER AG27-29 SERPL-ACNC: 14.8 U/ML
CEA SERPL-MCNC: 1.7 NG/ML
CHLORIDE SERPL-SCNC: 106 MMOL/L
CO2 SERPL-SCNC: 24 MMOL/L
CREAT SERPL-MCNC: 0.89 MG/DL
GLUCOSE SERPL-MCNC: 103 MG/DL
POTASSIUM SERPL-SCNC: 4.5 MMOL/L
PROT SERPL-MCNC: 6.9 G/DL
SARS-COV-2 IGG SERPL IA-ACNC: 0.01 INDEX
SARS-COV-2 IGG SERPL QL IA: NEGATIVE
SODIUM SERPL-SCNC: 143 MMOL/L

## 2020-08-25 ENCOUNTER — OUTPATIENT (OUTPATIENT)
Dept: OUTPATIENT SERVICES | Facility: HOSPITAL | Age: 55
LOS: 1 days | Discharge: HOME | End: 2020-08-25

## 2020-08-25 DIAGNOSIS — Z90.49 ACQUIRED ABSENCE OF OTHER SPECIFIED PARTS OF DIGESTIVE TRACT: Chronic | ICD-10-CM

## 2020-08-25 DIAGNOSIS — C50.911 MALIGNANT NEOPLASM OF UNSPECIFIED SITE OF RIGHT FEMALE BREAST: ICD-10-CM

## 2020-08-25 DIAGNOSIS — Z98.891 HISTORY OF UTERINE SCAR FROM PREVIOUS SURGERY: Chronic | ICD-10-CM

## 2020-09-01 ENCOUNTER — RESULT REVIEW (OUTPATIENT)
Age: 55
End: 2020-09-01

## 2020-09-01 ENCOUNTER — APPOINTMENT (OUTPATIENT)
Dept: HEMATOLOGY ONCOLOGY | Facility: CLINIC | Age: 55
End: 2020-09-01
Payer: MEDICARE

## 2020-09-01 VITALS
RESPIRATION RATE: 18 BRPM | DIASTOLIC BLOOD PRESSURE: 94 MMHG | SYSTOLIC BLOOD PRESSURE: 129 MMHG | HEART RATE: 105 BPM | HEIGHT: 67.13 IN | OXYGEN SATURATION: 97 % | TEMPERATURE: 98.3 F | WEIGHT: 223.11 LBS | BODY MASS INDEX: 34.61 KG/M2

## 2020-09-01 LAB
BASOPHILS # BLD AUTO: 0.05 K/UL — SIGNIFICANT CHANGE UP (ref 0–0.2)
BASOPHILS NFR BLD AUTO: 2 % — SIGNIFICANT CHANGE UP (ref 0–2)
EOSINOPHIL # BLD AUTO: 0.03 K/UL — SIGNIFICANT CHANGE UP (ref 0–0.5)
EOSINOPHIL NFR BLD AUTO: 1.2 % — SIGNIFICANT CHANGE UP (ref 0–6)
HCT VFR BLD CALC: 38.7 % — SIGNIFICANT CHANGE UP (ref 34.5–45)
HGB BLD-MCNC: 12.9 G/DL — SIGNIFICANT CHANGE UP (ref 11.5–15.5)
IMM GRANULOCYTES NFR BLD AUTO: 0 % — SIGNIFICANT CHANGE UP (ref 0–1.5)
LYMPHOCYTES # BLD AUTO: 0.88 K/UL — LOW (ref 1–3.3)
LYMPHOCYTES # BLD AUTO: 35.1 % — SIGNIFICANT CHANGE UP (ref 13–44)
MCHC RBC-ENTMCNC: 33.3 GM/DL — SIGNIFICANT CHANGE UP (ref 32–36)
MCHC RBC-ENTMCNC: 34.5 PG — HIGH (ref 27–34)
MCV RBC AUTO: 103.5 FL — HIGH (ref 80–100)
MONOCYTES # BLD AUTO: 0.19 K/UL — SIGNIFICANT CHANGE UP (ref 0–0.9)
MONOCYTES NFR BLD AUTO: 7.6 % — SIGNIFICANT CHANGE UP (ref 2–14)
NEUTROPHILS # BLD AUTO: 1.36 K/UL — LOW (ref 1.8–7.4)
NEUTROPHILS NFR BLD AUTO: 54.1 % — SIGNIFICANT CHANGE UP (ref 43–77)
NRBC # BLD: 0 /100 WBCS — SIGNIFICANT CHANGE UP (ref 0–0)
PLATELET # BLD AUTO: 281 K/UL — SIGNIFICANT CHANGE UP (ref 150–400)
RBC # BLD: 3.74 M/UL — LOW (ref 3.8–5.2)
RBC # FLD: 12.9 % — SIGNIFICANT CHANGE UP (ref 10.3–14.5)
WBC # BLD: 2.51 K/UL — LOW (ref 3.8–10.5)
WBC # FLD AUTO: 2.51 K/UL — LOW (ref 3.8–10.5)

## 2020-09-01 PROCEDURE — 99214 OFFICE O/P EST MOD 30 MIN: CPT

## 2020-09-02 LAB
ALBUMIN SERPL ELPH-MCNC: 4.5 G/DL
ALP BLD-CCNC: 59 U/L
ALT SERPL-CCNC: 16 U/L
ANION GAP SERPL CALC-SCNC: 14 MMOL/L
AST SERPL-CCNC: 20 U/L
BILIRUB SERPL-MCNC: 0.3 MG/DL
BUN SERPL-MCNC: 18 MG/DL
CALCIUM SERPL-MCNC: 9.4 MG/DL
CANCER AG27-29 SERPL-ACNC: 14.3 U/ML
CEA SERPL-MCNC: 1.8 NG/ML
CHLORIDE SERPL-SCNC: 106 MMOL/L
CO2 SERPL-SCNC: 22 MMOL/L
CREAT SERPL-MCNC: 1.09 MG/DL
GLUCOSE SERPL-MCNC: 95 MG/DL
POTASSIUM SERPL-SCNC: 4.4 MMOL/L
PROT SERPL-MCNC: 6.7 G/DL
SODIUM SERPL-SCNC: 143 MMOL/L

## 2020-09-02 NOTE — PHYSICAL EXAM
[Fully active, able to carry on all pre-disease performance without restriction] : Status 0 - Fully active, able to carry on all pre-disease performance without restriction [Normal] : affect appropriate [de-identified] : Mild erythema and edema of right breast but no new masses

## 2020-09-02 NOTE — HISTORY OF PRESENT ILLNESS
[Disease: _____________________] : Disease: [unfilled] [T: ___] : T[unfilled] [N: ___] : N[unfilled] [M: ___] : M[unfilled] [AJCC Stage: ____] : AJCC Stage: [unfilled] [de-identified] : Right breast cancer diagnosed at the age of 52\par 11/14 Presented with right breast mastitis and treated with antibiotics\par Multiple recurrence of mastitis over next two years, 3-4 per year with increased frequency in 2016-17\par 6/13/16 She had a 3 mm punch biopsy of the right breast which revealed interstitial neutrophilic infiltrate\par 04/25/17 Bilateral breast mammogram showed no significant interval change in the appearance of the diffuse breast edema and diffuse skin thickening in the right breast with no specific evidence of adenopathy or underlying abnormality. New skin thickening and increase parenchymal density in the left LIQ. Finding are indeterminate and biopsy was suggested. Bilateral breast ultrasound showed diffuse right sided skin thickening with focal left medial new skin thickening.\par 06/02/17 Diagnostic mammogram revealed signs of breast edema with skin thickening, progressive since prior studies. D/DX cellulitis vs. mastitis vs. inflammatory breast cancer.\par 06/03/17 Bilateral breast ultrasound again showed nonspecific and recurrent signs of inflammation in the right breast, consisting of skin thickening and breast edema. Patient reported having pain, erythema, fever and shaking chills, slightly better with antibiotics.\par 06/22/17 Right central breast, sonoguided core biopsy showed  invasive ductal carcinoma, SBR 6/9, ER 95%, [place] 30% and HER 2 negative and DCIS solid pattern with intermediate grade nuclear atypia.\par 06/26/17 MRI of the breast showed a recently diagnosed right breast malignancy, with diffuse skin thickening and asymmetric parenchymal enhancement. A 5 mm enhancing nodule  in the right upper outer breast, possibly representing intramammary lymph node. No suspicious findings in the left breast.\par 06/27/17 CT scan of C/A/P showed an irregular right sided breast thickening compatible with biopsy proven right breast cancer. Osseous metastases involving the T10, T11 and L4 vertebral bodies. Indeterminate 2 cm left adrenal nodule.\par 06/27/17 Bone scan showed abnormal bone scan compatible with multifocal osseous metastases in the thoracolumbar spine, left iliac bone and in the right acetabulum.\par 7/14/17 BSO \par 7/21/17 Started letrozole and Ibrance \par 7/2017 Xgeva started every 3 months and then decreased to every 6 months due to good cancer control [de-identified] : Invasive ductal carcinoma, ER 95%, MA 30% and HER 2 negative [de-identified] : Nasrin continues to do well on letrozole and Ibrance which she has been on since 7/17 with infrequent mouth sores, mild to moderate  arthralgias and hot flashes.\par In 7/2020 patient developed right breast pain with associated erythema and fever of 103. She was immediately started Keflex with resolutions of her symptoms by day 3 of ABX. \par This is the second time she developed mastitis of the right breast since being on Ibrance, prior that being on Ibrance the mastitis/cellulitis would recur very frequently\par Scans done in July did not show any evidence of new or worsening disease.\par She is overall doing well, but finds the arthralgia a little worse than before and she is  requesting to  be switched to brand name of Letrozole, which she heard may improve some the joint pain and stiffness she has been experiencing , now that she has medicare , which maybe covered under her plan.\par \par 07/10/20 Bone scan showed osseous metastatic disease not significantly changed. Degenerative disease in the major joints. No new osseous lesions.\par 07/10/20 CT scan showed Stable exam. Sclerotic osseous metastases unchanged\par 3/11/20 CT scan showed Osseous metastatic disease, unchanged. No new foci of metastatic disease.\par 7/16/19 CT Scan showed stable examination. Sclerotic osseous metastatic disease without significant change.\par 7/16/19 Bone scan showed no significant interval change compared to the previous bone scan of 7/11/18, showing resolved or less prominent metastatic lesions from 6/27/17. No new osseous lesions are identified. 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 Stable radiographic distribution of sclerotic metastases. \par CT scan from 11/2/18: Stable with no new findings\par CT scan from 7/11/18: Stable with no new findings\par Bone scan 7/11/18 stable to improved with no new areas of disease\par CT scan from 4/9/18: Stable osseous metastases and no new findings.\par CT scan from 1/16/18: Stable osseous metastases. No radiographic evidence of visceral metastasis with a stable 2 cm indeterminate left adrenal nodule\par CT scan from 11/11/2019 Stable osseous metastases, no new areas of disease\par \par \par \par \par \par

## 2020-10-29 ENCOUNTER — OUTPATIENT (OUTPATIENT)
Dept: OUTPATIENT SERVICES | Facility: HOSPITAL | Age: 55
LOS: 1 days | Discharge: HOME | End: 2020-10-29

## 2020-10-29 ENCOUNTER — APPOINTMENT (OUTPATIENT)
Dept: CT IMAGING | Facility: CLINIC | Age: 55
End: 2020-10-29
Payer: MEDICARE

## 2020-10-29 ENCOUNTER — OUTPATIENT (OUTPATIENT)
Dept: OUTPATIENT SERVICES | Facility: HOSPITAL | Age: 55
LOS: 1 days | End: 2020-10-29
Payer: MEDICARE

## 2020-10-29 DIAGNOSIS — C50.911 MALIGNANT NEOPLASM OF UNSPECIFIED SITE OF RIGHT FEMALE BREAST: ICD-10-CM

## 2020-10-29 DIAGNOSIS — Z98.891 HISTORY OF UTERINE SCAR FROM PREVIOUS SURGERY: Chronic | ICD-10-CM

## 2020-10-29 DIAGNOSIS — Z90.49 ACQUIRED ABSENCE OF OTHER SPECIFIED PARTS OF DIGESTIVE TRACT: Chronic | ICD-10-CM

## 2020-10-29 PROCEDURE — 74177 CT ABD & PELVIS W/CONTRAST: CPT

## 2020-10-29 PROCEDURE — 74177 CT ABD & PELVIS W/CONTRAST: CPT | Mod: 26

## 2020-10-29 PROCEDURE — 82565 ASSAY OF CREATININE: CPT

## 2020-10-29 PROCEDURE — 71260 CT THORAX DX C+: CPT | Mod: 26

## 2020-10-29 PROCEDURE — 71260 CT THORAX DX C+: CPT

## 2020-11-05 ENCOUNTER — APPOINTMENT (OUTPATIENT)
Dept: INFUSION THERAPY | Facility: HOSPITAL | Age: 55
End: 2020-11-05
Payer: MEDICARE

## 2020-11-05 ENCOUNTER — APPOINTMENT (OUTPATIENT)
Dept: HEMATOLOGY ONCOLOGY | Facility: CLINIC | Age: 55
End: 2020-11-05
Payer: MEDICARE

## 2020-11-05 ENCOUNTER — RESULT REVIEW (OUTPATIENT)
Age: 55
End: 2020-11-05

## 2020-11-05 VITALS
RESPIRATION RATE: 16 BRPM | TEMPERATURE: 97.4 F | SYSTOLIC BLOOD PRESSURE: 110 MMHG | HEIGHT: 66.18 IN | DIASTOLIC BLOOD PRESSURE: 79 MMHG | OXYGEN SATURATION: 99 % | HEART RATE: 100 BPM | BODY MASS INDEX: 35.61 KG/M2 | WEIGHT: 221.56 LBS

## 2020-11-05 DIAGNOSIS — C79.51 SECONDARY MALIGNANT NEOPLASM OF BONE: ICD-10-CM

## 2020-11-05 LAB
ALBUMIN SERPL ELPH-MCNC: 4.8 G/DL
ALP BLD-CCNC: 60 U/L
ALT SERPL-CCNC: 25 U/L
ANION GAP SERPL CALC-SCNC: 12 MMOL/L
AST SERPL-CCNC: 31 U/L
BASOPHILS # BLD AUTO: 0.07 K/UL — SIGNIFICANT CHANGE UP (ref 0–0.2)
BASOPHILS NFR BLD AUTO: 2.9 % — HIGH (ref 0–2)
BILIRUB SERPL-MCNC: 0.3 MG/DL
BUN SERPL-MCNC: 19 MG/DL
CALCIUM SERPL-MCNC: 9.9 MG/DL
CEA SERPL-MCNC: 1.7 NG/ML
CHLORIDE SERPL-SCNC: 99 MMOL/L
CO2 SERPL-SCNC: 25 MMOL/L
CREAT SERPL-MCNC: 0.9 MG/DL
EOSINOPHIL # BLD AUTO: 0.03 K/UL — SIGNIFICANT CHANGE UP (ref 0–0.5)
EOSINOPHIL NFR BLD AUTO: 1.2 % — SIGNIFICANT CHANGE UP (ref 0–6)
GLUCOSE SERPL-MCNC: 94 MG/DL
HCT VFR BLD CALC: 40.4 % — SIGNIFICANT CHANGE UP (ref 34.5–45)
HGB BLD-MCNC: 13.6 G/DL — SIGNIFICANT CHANGE UP (ref 11.5–15.5)
IMM GRANULOCYTES NFR BLD AUTO: 0 % — SIGNIFICANT CHANGE UP (ref 0–1.5)
LYMPHOCYTES # BLD AUTO: 0.98 K/UL — LOW (ref 1–3.3)
LYMPHOCYTES # BLD AUTO: 40.7 % — SIGNIFICANT CHANGE UP (ref 13–44)
MCHC RBC-ENTMCNC: 33.7 G/DL — SIGNIFICANT CHANGE UP (ref 32–36)
MCHC RBC-ENTMCNC: 34.1 PG — HIGH (ref 27–34)
MCV RBC AUTO: 101.3 FL — HIGH (ref 80–100)
MONOCYTES # BLD AUTO: 0.29 K/UL — SIGNIFICANT CHANGE UP (ref 0–0.9)
MONOCYTES NFR BLD AUTO: 12 % — SIGNIFICANT CHANGE UP (ref 2–14)
NEUTROPHILS # BLD AUTO: 1.04 K/UL — LOW (ref 1.8–7.4)
NEUTROPHILS NFR BLD AUTO: 43.2 % — SIGNIFICANT CHANGE UP (ref 43–77)
NRBC # BLD: 0 /100 WBCS — SIGNIFICANT CHANGE UP (ref 0–0)
PLATELET # BLD AUTO: 145 K/UL — LOW (ref 150–400)
POTASSIUM SERPL-SCNC: 4.5 MMOL/L
PROT SERPL-MCNC: 7.2 G/DL
RBC # BLD: 3.99 M/UL — SIGNIFICANT CHANGE UP (ref 3.8–5.2)
RBC # FLD: 12.8 % — SIGNIFICANT CHANGE UP (ref 10.3–14.5)
SODIUM SERPL-SCNC: 137 MMOL/L
WBC # BLD: 2.41 K/UL — LOW (ref 3.8–10.5)
WBC # FLD AUTO: 2.41 K/UL — LOW (ref 3.8–10.5)

## 2020-11-05 PROCEDURE — 99215 OFFICE O/P EST HI 40 MIN: CPT

## 2020-11-05 PROCEDURE — G0008: CPT

## 2020-11-05 RX ORDER — PALBOCICLIB 125 MG/1
125 CAPSULE ORAL
Qty: 21 | Refills: 3 | Status: DISCONTINUED | COMMUNITY
Start: 2017-07-17 | End: 2020-11-05

## 2020-11-05 NOTE — PHYSICAL EXAM
[Fully active, able to carry on all pre-disease performance without restriction] : Status 0 - Fully active, able to carry on all pre-disease performance without restriction [Normal] : affect appropriate [de-identified] : Right breast with increasingly normal tissue and less firmness from cancer

## 2020-11-05 NOTE — HISTORY OF PRESENT ILLNESS
[Disease: _____________________] : Disease: [unfilled] [T: ___] : T[unfilled] [N: ___] : N[unfilled] [M: ___] : M[unfilled] [AJCC Stage: ____] : AJCC Stage: [unfilled] [de-identified] : Right breast cancer diagnosed at the age of 52\par 11/14 Presented with right breast mastitis and treated with antibiotics\par Multiple recurrence of mastitis over next two years, 3-4 per year with increased frequency in 2016-17\par 6/13/16 She had a 3 mm punch biopsy of the right breast which revealed interstitial neutrophilic infiltrate\par 04/25/17 Bilateral breast mammogram showed no significant interval change in the appearance of the diffuse breast edema and diffuse skin thickening in the right breast with no specific evidence of adenopathy or underlying abnormality. New skin thickening and increase parenchymal density in the left LIQ. Finding are indeterminate and biopsy was suggested. Bilateral breast ultrasound showed diffuse right sided skin thickening with focal left medial new skin thickening.\par 06/02/17 Diagnostic mammogram revealed signs of breast edema with skin thickening, progressive since prior studies. D/DX cellulitis vs. mastitis vs. inflammatory breast cancer.\par 06/03/17 Bilateral breast ultrasound again showed nonspecific and recurrent signs of inflammation in the right breast, consisting of skin thickening and breast edema. Patient reported having pain, erythema, fever and shaking chills, slightly better with antibiotics.\par 06/22/17 Right central breast, sonoguided core biopsy showed  invasive ductal carcinoma, SBR 6/9, ER 95%, CT 30% and HER 2 negative and DCIS solid pattern with intermediate grade nuclear atypia.\par 06/26/17 MRI of the breast showed a recently diagnosed right breast malignancy, with diffuse skin thickening and asymmetric parenchymal enhancement. A 5 mm enhancing nodule  in the right upper outer breast, possibly representing intramammary lymph node. No suspicious findings in the left breast.\par 06/27/17 CT scan of C/A/P showed an irregular right sided breast thickening compatible with biopsy proven right breast cancer. Osseous metastases involving the T10, T11 and L4 vertebral bodies. Indeterminate 2 cm left adrenal nodule.\par 06/27/17 Bone scan showed abnormal bone scan compatible with multifocal osseous metastases in the thoracolumbar spine, left iliac bone and in the right acetabulum.\par 7/14/17 BSO \par 7/21/17 Started letrozole and Ibrance \par 7/2017 Xgeva started every 3 months and then decreased to every 6 months due to good cancer control [de-identified] : Invasive ductal carcinoma, ER 95%, NE 30% and HER 2 negative [de-identified] : Nasrin continues to do well on letrozole and Ibrance which she has been on since 7/17 with infrequent mouth sores, and mild to moderate arthralgias and hot flashes.\par She is overall doing well, but finds the arthralgias have worsened over time. We changed to Femara from letrozole 2 months ago with no significant change.\par She had an episodes of right breast mastitis 7/2020 and immediately started Keflex with resolutions of her symptoms in 3 days. No recurrence since then. \par Overall she is doing well with good energy and appetite.\par Her daughters are back in college and doing well, living off campus.\par \par 10/29/20 CT scan C/A/P showed multiple blastic bone metastases demonstrating no change since the prior exam. No other evidence of metastatic disease.\par 07/10/20 Bone scan showed osseous metastatic disease not significantly changed. Degenerative disease in the major joints. No new osseous lesions.\par 07/10/20 CT scan showed Stable exam. Sclerotic osseous metastases unchanged\par 3/11/20 CT scan showed Osseous metastatic disease, unchanged. No new foci of metastatic disease.\par 7/16/19 CT Scan showed stable examination. Sclerotic osseous metastatic disease without significant change.\par 7/16/19 Bone scan showed no significant interval change compared to the previous bone scan of 7/11/18, showing resolved or less prominent metastatic lesions from 6/27/17. No new osseous lesions are identified. 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 Stable radiographic distribution of sclerotic metastases. \par CT scan from 11/2/18: Stable with no new findings\par CT scan from 7/11/18: Stable with no new findings\par Bone scan 7/11/18 stable to improved with no new areas of disease\par CT scan from 4/9/18: Stable osseous metastases and no new findings.\par CT scan from 1/16/18: Stable osseous metastases. No radiographic evidence of visceral metastasis with a stable 2 cm indeterminate left adrenal nodule\par CT scan from 11/11/2019 Stable osseous metastases, no new areas of disease\par \par \par \par \par \par

## 2020-11-06 LAB — CANCER AG27-29 SERPL-ACNC: 14.8 U/ML

## 2020-11-12 ENCOUNTER — TRANSCRIPTION ENCOUNTER (OUTPATIENT)
Age: 55
End: 2020-11-12

## 2020-11-19 ENCOUNTER — RX RENEWAL (OUTPATIENT)
Age: 55
End: 2020-11-19

## 2020-12-29 ENCOUNTER — OUTPATIENT (OUTPATIENT)
Dept: OUTPATIENT SERVICES | Facility: HOSPITAL | Age: 55
LOS: 1 days | Discharge: HOME | End: 2020-12-29

## 2020-12-29 DIAGNOSIS — Z90.49 ACQUIRED ABSENCE OF OTHER SPECIFIED PARTS OF DIGESTIVE TRACT: Chronic | ICD-10-CM

## 2020-12-29 DIAGNOSIS — C50.911 MALIGNANT NEOPLASM OF UNSPECIFIED SITE OF RIGHT FEMALE BREAST: ICD-10-CM

## 2020-12-29 DIAGNOSIS — C79.51 SECONDARY MALIGNANT NEOPLASM OF BONE: ICD-10-CM

## 2020-12-29 DIAGNOSIS — Z98.891 HISTORY OF UTERINE SCAR FROM PREVIOUS SURGERY: Chronic | ICD-10-CM

## 2021-01-05 ENCOUNTER — RESULT REVIEW (OUTPATIENT)
Age: 56
End: 2021-01-05

## 2021-01-05 ENCOUNTER — APPOINTMENT (OUTPATIENT)
Dept: HEMATOLOGY ONCOLOGY | Facility: CLINIC | Age: 56
End: 2021-01-05
Payer: MEDICARE

## 2021-01-05 VITALS
HEART RATE: 97 BPM | OXYGEN SATURATION: 98 % | SYSTOLIC BLOOD PRESSURE: 117 MMHG | HEIGHT: 66.18 IN | BODY MASS INDEX: 35.61 KG/M2 | DIASTOLIC BLOOD PRESSURE: 81 MMHG | WEIGHT: 221.56 LBS | RESPIRATION RATE: 17 BRPM | TEMPERATURE: 97.6 F

## 2021-01-05 DIAGNOSIS — E55.9 VITAMIN D DEFICIENCY, UNSPECIFIED: ICD-10-CM

## 2021-01-05 LAB
BASOPHILS # BLD AUTO: 0.05 K/UL — SIGNIFICANT CHANGE UP (ref 0–0.2)
BASOPHILS NFR BLD AUTO: 1.6 % — SIGNIFICANT CHANGE UP (ref 0–2)
EOSINOPHIL # BLD AUTO: 0.05 K/UL — SIGNIFICANT CHANGE UP (ref 0–0.5)
EOSINOPHIL NFR BLD AUTO: 1.6 % — SIGNIFICANT CHANGE UP (ref 0–6)
HCT VFR BLD CALC: 39.1 % — SIGNIFICANT CHANGE UP (ref 34.5–45)
HGB BLD-MCNC: 13.4 G/DL — SIGNIFICANT CHANGE UP (ref 11.5–15.5)
IMM GRANULOCYTES NFR BLD AUTO: 0.3 % — SIGNIFICANT CHANGE UP (ref 0–1.5)
LYMPHOCYTES # BLD AUTO: 1 K/UL — SIGNIFICANT CHANGE UP (ref 1–3.3)
LYMPHOCYTES # BLD AUTO: 32.9 % — SIGNIFICANT CHANGE UP (ref 13–44)
MCHC RBC-ENTMCNC: 34.3 G/DL — SIGNIFICANT CHANGE UP (ref 32–36)
MCHC RBC-ENTMCNC: 34.4 PG — HIGH (ref 27–34)
MCV RBC AUTO: 100.5 FL — HIGH (ref 80–100)
MONOCYTES # BLD AUTO: 0.24 K/UL — SIGNIFICANT CHANGE UP (ref 0–0.9)
MONOCYTES NFR BLD AUTO: 7.9 % — SIGNIFICANT CHANGE UP (ref 2–14)
NEUTROPHILS # BLD AUTO: 1.69 K/UL — LOW (ref 1.8–7.4)
NEUTROPHILS NFR BLD AUTO: 55.7 % — SIGNIFICANT CHANGE UP (ref 43–77)
NRBC # BLD: 0 /100 WBCS — SIGNIFICANT CHANGE UP (ref 0–0)
PLATELET # BLD AUTO: 152 K/UL — SIGNIFICANT CHANGE UP (ref 150–400)
RBC # BLD: 3.89 M/UL — SIGNIFICANT CHANGE UP (ref 3.8–5.2)
RBC # FLD: 11.9 % — SIGNIFICANT CHANGE UP (ref 10.3–14.5)
WBC # BLD: 3.04 K/UL — LOW (ref 3.8–10.5)
WBC # FLD AUTO: 3.04 K/UL — LOW (ref 3.8–10.5)

## 2021-01-05 PROCEDURE — 99215 OFFICE O/P EST HI 40 MIN: CPT

## 2021-01-05 NOTE — HISTORY OF PRESENT ILLNESS
[Disease: _____________________] : Disease: [unfilled] [T: ___] : T[unfilled] [N: ___] : N[unfilled] [M: ___] : M[unfilled] [AJCC Stage: ____] : AJCC Stage: [unfilled] [de-identified] : Right breast cancer diagnosed at the age of 52\par 11/14 Presented with right breast mastitis and treated with antibiotics\par Multiple recurrence of mastitis over next two years, 3-4 per year with increased frequency in 2016-17\par 6/13/16 She had a 3 mm punch biopsy of the right breast which revealed interstitial neutrophilic infiltrate\par 04/25/17 Bilateral breast mammogram showed no significant interval change in the appearance of the diffuse breast edema and diffuse skin thickening in the right breast with no specific evidence of adenopathy or underlying abnormality. New skin thickening and increase parenchymal density in the left LIQ. Finding are indeterminate and biopsy was suggested. Bilateral breast ultrasound showed diffuse right sided skin thickening with focal left medial new skin thickening.\par 06/02/17 Diagnostic mammogram revealed signs of breast edema with skin thickening, progressive since prior studies. D/DX cellulitis vs. mastitis vs. inflammatory breast cancer.\par 06/03/17 Bilateral breast ultrasound again showed nonspecific and recurrent signs of inflammation in the right breast, consisting of skin thickening and breast edema. Patient reported having pain, erythema, fever and shaking chills, slightly better with antibiotics.\par 06/22/17 Right central breast, sonoguided core biopsy showed  invasive ductal carcinoma, SBR 6/9, ER 95%, CA 30% and HER 2 negative and DCIS solid pattern with intermediate grade nuclear atypia.\par 06/26/17 MRI of the breast showed a recently diagnosed right breast malignancy, with diffuse skin thickening and asymmetric parenchymal enhancement. A 5 mm enhancing nodule  in the right upper outer breast, possibly representing intramammary lymph node. No suspicious findings in the left breast.\par 06/27/17 CT scan of C/A/P showed an irregular right sided breast thickening compatible with biopsy proven right breast cancer. Osseous metastases involving the T10, T11 and L4 vertebral bodies. Indeterminate 2 cm left adrenal nodule.\par 06/27/17 Bone scan showed abnormal bone scan compatible with multifocal osseous metastases in the thoracolumbar spine, left iliac bone and in the right acetabulum.\par 7/14/17 BSO \par 7/21/17 Started letrozole and Ibrance \par 7/2017 Xgeva started every 3 months and then decreased to every 6 months due to good cancer control [de-identified] : Invasive ductal carcinoma, ER 95%, CT 30% and HER 2 negative [de-identified] : Nasrin continues to do well on letrozole and Ibrance which she has been on since 7/17 with infrequent mouth sores, and mild to moderate arthralgias and hot flashes.\par She is overall doing well, but finds the arthralgias continue to be an issue and at this time she is particularly struggling with pain in her ankles and feet. She has been wearing support stockings and good shoes but gets a lot of pain if on her feet a lot. We changed to Femara from letrozole in mid 2020 with no significant change. She has been taking glucosamine chondroitin for a while and is not sure if that has made a difference.\par She had an episodes of right breast mastitis 7/2020 and immediately started Keflex with resolutions of her symptoms in 3 days. No recurrence since then. \par Overall she is doing well with good energy and appetite.\par \par 10/29/20 CT scan C/A/P showed multiple blastic bone metastases demonstrating no change since the prior exam. No other evidence of metastatic disease.\par 07/10/20 Bone scan showed osseous metastatic disease not significantly changed. Degenerative disease in the major joints. No new osseous lesions.\par 07/10/20 CT scan showed Stable exam. Sclerotic osseous metastases unchanged\par 3/11/20 CT scan showed Osseous metastatic disease, unchanged. No new foci of metastatic disease.\par 7/16/19 CT Scan showed stable examination. Sclerotic osseous metastatic disease without significant change.\par 7/16/19 Bone scan showed no significant interval change compared to the previous bone scan of 7/11/18, showing resolved or less prominent metastatic lesions from 6/27/17. No new osseous lesions are identified. 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 Stable radiographic distribution of sclerotic metastases. \par CT scan from 11/2/18: Stable with no new findings\par CT scan from 7/11/18: Stable with no new findings\par Bone scan 7/11/18 stable to improved with no new areas of disease\par CT scan from 4/9/18: Stable osseous metastases and no new findings.\par CT scan from 1/16/18: Stable osseous metastases. No radiographic evidence of visceral metastasis with a stable 2 cm indeterminate left adrenal nodule\par CT scan from 11/11/2019 Stable osseous metastases, no new areas of disease\par \par \par \par \par \par

## 2021-01-05 NOTE — PHYSICAL EXAM
[Fully active, able to carry on all pre-disease performance without restriction] : Status 0 - Fully active, able to carry on all pre-disease performance without restriction [Normal] : affect appropriate [de-identified] : Right breast with increasingly normal tissue and less firmness from cancer, central mass still present but much smaller

## 2021-01-06 LAB
ALBUMIN SERPL ELPH-MCNC: 4.5 G/DL
ALP BLD-CCNC: 58 U/L
ALT SERPL-CCNC: 22 U/L
ANION GAP SERPL CALC-SCNC: 14 MMOL/L
AST SERPL-CCNC: 16 U/L
BILIRUB SERPL-MCNC: 0.2 MG/DL
BUN SERPL-MCNC: 21 MG/DL
CALCIUM SERPL-MCNC: 9.7 MG/DL
CANCER AG27-29 SERPL-ACNC: 16.3 U/ML
CEA SERPL-MCNC: 1.6 NG/ML
CHLORIDE SERPL-SCNC: 105 MMOL/L
CO2 SERPL-SCNC: 21 MMOL/L
CREAT SERPL-MCNC: 1.01 MG/DL
GLUCOSE SERPL-MCNC: 92 MG/DL
POTASSIUM SERPL-SCNC: 4.5 MMOL/L
PROT SERPL-MCNC: 6.9 G/DL
SODIUM SERPL-SCNC: 140 MMOL/L

## 2021-02-12 ENCOUNTER — RX RENEWAL (OUTPATIENT)
Age: 56
End: 2021-02-12

## 2021-02-22 RX ORDER — LETROZOLE 2.5 MG/1
2.5 TABLET, FILM COATED ORAL
Qty: 90 | Refills: 1 | Status: DISCONTINUED | COMMUNITY
Start: 2017-07-17 | End: 2021-02-22

## 2021-03-01 ENCOUNTER — NON-APPOINTMENT (OUTPATIENT)
Age: 56
End: 2021-03-01

## 2021-03-01 ENCOUNTER — APPOINTMENT (OUTPATIENT)
Dept: FAMILY MEDICINE | Facility: CLINIC | Age: 56
End: 2021-03-01
Payer: MEDICARE

## 2021-03-01 VITALS
HEART RATE: 110 BPM | HEIGHT: 66 IN | DIASTOLIC BLOOD PRESSURE: 84 MMHG | BODY MASS INDEX: 35.68 KG/M2 | WEIGHT: 222 LBS | OXYGEN SATURATION: 99 % | TEMPERATURE: 96.3 F | SYSTOLIC BLOOD PRESSURE: 124 MMHG

## 2021-03-01 VITALS — SYSTOLIC BLOOD PRESSURE: 130 MMHG | DIASTOLIC BLOOD PRESSURE: 86 MMHG

## 2021-03-01 DIAGNOSIS — Z13.89 ENCOUNTER FOR SCREENING FOR OTHER DISORDER: ICD-10-CM

## 2021-03-01 DIAGNOSIS — Z00.00 ENCOUNTER FOR GENERAL ADULT MEDICAL EXAMINATION W/OUT ABNORMAL FINDINGS: ICD-10-CM

## 2021-03-01 DIAGNOSIS — R94.31 ABNORMAL ELECTROCARDIOGRAM [ECG] [EKG]: ICD-10-CM

## 2021-03-01 DIAGNOSIS — Z13.29 ENCOUNTER FOR SCREENING FOR OTHER SUSPECTED ENDOCRINE DISORDER: ICD-10-CM

## 2021-03-01 DIAGNOSIS — M79.672 PAIN IN LEFT FOOT: ICD-10-CM

## 2021-03-01 DIAGNOSIS — Z12.11 ENCOUNTER FOR SCREENING FOR MALIGNANT NEOPLASM OF COLON: ICD-10-CM

## 2021-03-01 DIAGNOSIS — Z13.1 ENCOUNTER FOR SCREENING FOR DIABETES MELLITUS: ICD-10-CM

## 2021-03-01 PROCEDURE — G0444 DEPRESSION SCREEN ANNUAL: CPT | Mod: 59

## 2021-03-01 PROCEDURE — 93000 ELECTROCARDIOGRAM COMPLETE: CPT | Mod: 59

## 2021-03-01 PROCEDURE — G0439: CPT

## 2021-03-01 PROCEDURE — G0442 ANNUAL ALCOHOL SCREEN 15 MIN: CPT | Mod: 59

## 2021-03-01 NOTE — HISTORY OF PRESENT ILLNESS
[FreeTextEntry1] : Annual  [de-identified] : SANDER is a 55 year female here for AWV. Patient confirms that she had received her second COVID-19 vaccination 2/10/21 at a [facility] in [place], [place]. Patient confirms that she is having a follow up scan for oncologist tomorrow. Patient states that she hasn't had any episodes of cellulitis since July. Patient states that her oncologist suggests against a colonoscopy. Receives bone scans once a year. Patient states that she is not currently compliant with dermatology screening. Confirms compliance with the Wellbutrin. Patient takes Vitamin - D and Calcium on a regular basis. Patient states that her father had kidney and bladder cancer. Confirms normal bowel habits. Patient confirms that she has been beginning to have worsening pain in her left foot. States that her father had the same issues with his ankle/ foot pain. \par \par

## 2021-03-01 NOTE — ADDENDUM
[FreeTextEntry1] : I, Donna Mcdonough acting as a scribe for Dr. Katey Montero on Mar 01, 2021  at 1:58 PM

## 2021-03-01 NOTE — HEALTH RISK ASSESSMENT
[Good] : ~his/her~  mood as  good [Yes] : Yes [0] : 2) Feeling down, depressed, or hopeless: Not at all (0) [] :  [# Of Children ___] : has [unfilled] children [Feels Safe at Home] : Feels safe at home [Fully functional (bathing, dressing, toileting, transferring, walking, feeding)] : Fully functional (bathing, dressing, toileting, transferring, walking, feeding) [Fully functional (using the telephone, shopping, preparing meals, housekeeping, doing laundry, using] : Fully functional and needs no help or supervision to perform IADLs (using the telephone, shopping, preparing meals, housekeeping, doing laundry, using transportation, managing medications and managing finances) [2 - 4 times a month (2 pts)] : 2-4 times a month (2 points) [No] : In the past 12 months have you used drugs other than those required for medical reasons? No [None] : None [With Family] : lives with family [] : No [Audit-CScore] : 2 [[id number]] : 0 [MammogramDate] : 04/17 [PapSmearDate] : 2017

## 2021-03-01 NOTE — END OF VISIT
[FreeTextEntry3] : Medical record entries made by the scribe today today, were at my direction and personally dictated to them by me, Dr. Katey Montero on Mar 01, 2021. I have reviewed the chart and agree that the record accurately reflects my personal performance of the history, physical exam, assessment, and plan.

## 2021-03-01 NOTE — PLAN
[FreeTextEntry1] : -Spoke to patient about option of Cologuard pre-screening. Will write script. Discussed with patient, if positive result, colonoscopy is recommended.  \par -Referred patient to dermatologist for checkup. \par -Advised patient to obtain shingles vaccination. Discussed possible side effects of vaccination. \par -Advised patient to follow up with orthopedist foot/ ankle physician. \par -Blood work in office today.

## 2021-03-02 ENCOUNTER — APPOINTMENT (OUTPATIENT)
Dept: CT IMAGING | Facility: CLINIC | Age: 56
End: 2021-03-02
Payer: MEDICARE

## 2021-03-02 ENCOUNTER — OUTPATIENT (OUTPATIENT)
Dept: OUTPATIENT SERVICES | Facility: HOSPITAL | Age: 56
LOS: 1 days | End: 2021-03-02
Payer: MEDICARE

## 2021-03-02 ENCOUNTER — RESULT REVIEW (OUTPATIENT)
Age: 56
End: 2021-03-02

## 2021-03-02 DIAGNOSIS — C50.911 MALIGNANT NEOPLASM OF UNSPECIFIED SITE OF RIGHT FEMALE BREAST: ICD-10-CM

## 2021-03-02 DIAGNOSIS — Z98.891 HISTORY OF UTERINE SCAR FROM PREVIOUS SURGERY: Chronic | ICD-10-CM

## 2021-03-02 DIAGNOSIS — Z90.49 ACQUIRED ABSENCE OF OTHER SPECIFIED PARTS OF DIGESTIVE TRACT: Chronic | ICD-10-CM

## 2021-03-02 LAB
25(OH)D3 SERPL-MCNC: 39 NG/ML
ALBUMIN SERPL ELPH-MCNC: 4.4 G/DL
ALP BLD-CCNC: 60 U/L
ALT SERPL-CCNC: 28 U/L
ANION GAP SERPL CALC-SCNC: 14 MMOL/L
APPEARANCE: CLEAR
AST SERPL-CCNC: 18 U/L
BACTERIA: NEGATIVE
BASOPHILS # BLD AUTO: 0.08 K/UL
BASOPHILS NFR BLD AUTO: 2.4 %
BILIRUB SERPL-MCNC: 0.2 MG/DL
BILIRUBIN URINE: NEGATIVE
BLOOD URINE: NEGATIVE
BUN SERPL-MCNC: 23 MG/DL
CALCIUM SERPL-MCNC: 9.4 MG/DL
CHLORIDE SERPL-SCNC: 106 MMOL/L
CHOLEST SERPL-MCNC: 201 MG/DL
CO2 SERPL-SCNC: 21 MMOL/L
COLOR: NORMAL
CREAT SERPL-MCNC: 1.07 MG/DL
EOSINOPHIL # BLD AUTO: 0.05 K/UL
EOSINOPHIL NFR BLD AUTO: 1.5 %
ESTIMATED AVERAGE GLUCOSE: 103 MG/DL
GLUCOSE QUALITATIVE U: NEGATIVE
GLUCOSE SERPL-MCNC: 99 MG/DL
HBA1C MFR BLD HPLC: 5.2 %
HCT VFR BLD CALC: 40.3 %
HDLC SERPL-MCNC: 79 MG/DL
HGB BLD-MCNC: 13.3 G/DL
HYALINE CASTS: 0 /LPF
IMM GRANULOCYTES NFR BLD AUTO: 0.3 %
KETONES URINE: NEGATIVE
LDLC SERPL CALC-MCNC: 99 MG/DL
LEUKOCYTE ESTERASE URINE: NEGATIVE
LYMPHOCYTES # BLD AUTO: 0.96 K/UL
LYMPHOCYTES NFR BLD AUTO: 28.5 %
MAN DIFF?: NORMAL
MCHC RBC-ENTMCNC: 33 GM/DL
MCHC RBC-ENTMCNC: 33.3 PG
MCV RBC AUTO: 101 FL
MICROSCOPIC-UA: NORMAL
MONOCYTES # BLD AUTO: 0.37 K/UL
MONOCYTES NFR BLD AUTO: 11 %
NEUTROPHILS # BLD AUTO: 1.9 K/UL
NEUTROPHILS NFR BLD AUTO: 56.3 %
NITRITE URINE: NEGATIVE
NONHDLC SERPL-MCNC: 122 MG/DL
PH URINE: 5.5
PLATELET # BLD AUTO: 160 K/UL
POTASSIUM SERPL-SCNC: 4.1 MMOL/L
PROT SERPL-MCNC: 6.9 G/DL
PROTEIN URINE: NEGATIVE
RBC # BLD: 3.99 M/UL
RBC # FLD: 12.4 %
RED BLOOD CELLS URINE: 1 /HPF
SODIUM SERPL-SCNC: 141 MMOL/L
SPECIFIC GRAVITY URINE: 1.03
SQUAMOUS EPITHELIAL CELLS: 1 /HPF
TRIGL SERPL-MCNC: 114 MG/DL
TSH SERPL-ACNC: 2.27 UIU/ML
URATE SERPL-MCNC: 4.2 MG/DL
UROBILINOGEN URINE: NORMAL
WBC # FLD AUTO: 3.37 K/UL
WHITE BLOOD CELLS URINE: 1 /HPF

## 2021-03-02 PROCEDURE — G1004: CPT

## 2021-03-02 PROCEDURE — 74177 CT ABD & PELVIS W/CONTRAST: CPT | Mod: 26,MH

## 2021-03-02 PROCEDURE — 71260 CT THORAX DX C+: CPT | Mod: 26,MH

## 2021-03-02 PROCEDURE — 71260 CT THORAX DX C+: CPT

## 2021-03-02 PROCEDURE — 74177 CT ABD & PELVIS W/CONTRAST: CPT

## 2021-03-05 ENCOUNTER — APPOINTMENT (OUTPATIENT)
Dept: ORTHOPEDIC SURGERY | Facility: CLINIC | Age: 56
End: 2021-03-05
Payer: MEDICARE

## 2021-03-05 DIAGNOSIS — M20.41 OTHER HAMMER TOE(S) (ACQUIRED), RIGHT FOOT: ICD-10-CM

## 2021-03-05 DIAGNOSIS — M76.822 POSTERIOR TIBIAL TENDINITIS, RIGHT LEG: ICD-10-CM

## 2021-03-05 DIAGNOSIS — M20.21 HALLUX RIGIDUS, RIGHT FOOT: ICD-10-CM

## 2021-03-05 DIAGNOSIS — M76.821 POSTERIOR TIBIAL TENDINITIS, RIGHT LEG: ICD-10-CM

## 2021-03-05 DIAGNOSIS — M25.571 PAIN IN RIGHT ANKLE AND JOINTS OF RIGHT FOOT: ICD-10-CM

## 2021-03-05 DIAGNOSIS — M20.31 HALLUX VARUS (ACQUIRED), RIGHT FOOT: ICD-10-CM

## 2021-03-05 PROCEDURE — 99204 OFFICE O/P NEW MOD 45 MIN: CPT

## 2021-03-08 ENCOUNTER — OUTPATIENT (OUTPATIENT)
Dept: OUTPATIENT SERVICES | Facility: HOSPITAL | Age: 56
LOS: 1 days | Discharge: HOME | End: 2021-03-08

## 2021-03-08 DIAGNOSIS — Z98.891 HISTORY OF UTERINE SCAR FROM PREVIOUS SURGERY: Chronic | ICD-10-CM

## 2021-03-08 DIAGNOSIS — Z90.49 ACQUIRED ABSENCE OF OTHER SPECIFIED PARTS OF DIGESTIVE TRACT: Chronic | ICD-10-CM

## 2021-03-08 DIAGNOSIS — C50.911 MALIGNANT NEOPLASM OF UNSPECIFIED SITE OF RIGHT FEMALE BREAST: ICD-10-CM

## 2021-03-09 ENCOUNTER — RESULT REVIEW (OUTPATIENT)
Age: 56
End: 2021-03-09

## 2021-03-09 ENCOUNTER — APPOINTMENT (OUTPATIENT)
Dept: HEMATOLOGY ONCOLOGY | Facility: CLINIC | Age: 56
End: 2021-03-09
Payer: MEDICARE

## 2021-03-09 VITALS
HEART RATE: 95 BPM | DIASTOLIC BLOOD PRESSURE: 73 MMHG | RESPIRATION RATE: 18 BRPM | TEMPERATURE: 97 F | OXYGEN SATURATION: 97 % | BODY MASS INDEX: 34.54 KG/M2 | HEIGHT: 67.32 IN | WEIGHT: 222.64 LBS | SYSTOLIC BLOOD PRESSURE: 107 MMHG

## 2021-03-09 LAB
BASOPHILS # BLD AUTO: 0.03 K/UL — SIGNIFICANT CHANGE UP (ref 0–0.2)
BASOPHILS NFR BLD AUTO: 1 % — SIGNIFICANT CHANGE UP (ref 0–2)
EOSINOPHIL # BLD AUTO: 0.05 K/UL — SIGNIFICANT CHANGE UP (ref 0–0.5)
EOSINOPHIL NFR BLD AUTO: 2 % — SIGNIFICANT CHANGE UP (ref 0–6)
HCT VFR BLD CALC: 39.1 % — SIGNIFICANT CHANGE UP (ref 34.5–45)
HGB BLD-MCNC: 13.2 G/DL — SIGNIFICANT CHANGE UP (ref 11.5–15.5)
LYMPHOCYTES # BLD AUTO: 1.05 K/UL — SIGNIFICANT CHANGE UP (ref 1–3.3)
LYMPHOCYTES # BLD AUTO: 39 % — SIGNIFICANT CHANGE UP (ref 13–44)
MCHC RBC-ENTMCNC: 33.8 G/DL — SIGNIFICANT CHANGE UP (ref 32–36)
MCHC RBC-ENTMCNC: 34.1 PG — HIGH (ref 27–34)
MCV RBC AUTO: 101 FL — HIGH (ref 80–100)
MONOCYTES # BLD AUTO: 0.22 K/UL — SIGNIFICANT CHANGE UP (ref 0–0.9)
MONOCYTES NFR BLD AUTO: 8 % — SIGNIFICANT CHANGE UP (ref 2–14)
NEUTROPHILS # BLD AUTO: 1.35 K/UL — LOW (ref 1.8–7.4)
NEUTROPHILS NFR BLD AUTO: 50 % — SIGNIFICANT CHANGE UP (ref 43–77)
NRBC # BLD: 0 /100 — SIGNIFICANT CHANGE UP (ref 0–0)
NRBC # BLD: SIGNIFICANT CHANGE UP /100 WBCS (ref 0–0)
PLAT MORPH BLD: NORMAL — SIGNIFICANT CHANGE UP
PLATELET # BLD AUTO: 307 K/UL — SIGNIFICANT CHANGE UP (ref 150–400)
RBC # BLD: 3.87 M/UL — SIGNIFICANT CHANGE UP (ref 3.8–5.2)
RBC # FLD: 12.6 % — SIGNIFICANT CHANGE UP (ref 10.3–14.5)
RBC BLD AUTO: SIGNIFICANT CHANGE UP
WBC # BLD: 2.7 K/UL — LOW (ref 3.8–10.5)
WBC # FLD AUTO: 2.7 K/UL — LOW (ref 3.8–10.5)

## 2021-03-09 PROCEDURE — 99214 OFFICE O/P EST MOD 30 MIN: CPT

## 2021-03-09 NOTE — PHYSICAL EXAM
[Fully active, able to carry on all pre-disease performance without restriction] : Status 0 - Fully active, able to carry on all pre-disease performance without restriction [Normal] : affect appropriate [de-identified] : Right breast with increasingly normal tissue and less firmness from cancer, central mass still present but much smaller

## 2021-03-09 NOTE — HISTORY OF PRESENT ILLNESS
[Disease: _____________________] : Disease: [unfilled] [T: ___] : T[unfilled] [N: ___] : N[unfilled] [M: ___] : M[unfilled] [AJCC Stage: ____] : AJCC Stage: [unfilled] [de-identified] : Right breast cancer diagnosed at the age of 52\par 11/14 Presented with right breast mastitis and treated with antibiotics\par Multiple recurrence of mastitis over next two years, 3-4 per year with increased frequency in 2016-17\par 6/13/16 She had a 3 mm punch biopsy of the right breast which revealed interstitial neutrophilic infiltrate\par 04/25/17 Bilateral breast mammogram showed no significant interval change in the appearance of the diffuse breast edema and diffuse skin thickening in the right breast with no specific evidence of adenopathy or underlying abnormality. New skin thickening and increase parenchymal density in the left LIQ. Finding are indeterminate and biopsy was suggested. Bilateral breast ultrasound showed diffuse right sided skin thickening with focal left medial new skin thickening.\par 06/02/17 Diagnostic mammogram revealed signs of breast edema with skin thickening, progressive since prior studies. D/DX cellulitis vs. mastitis vs. inflammatory breast cancer.\par 06/03/17 Bilateral breast ultrasound again showed nonspecific and recurrent signs of inflammation in the right breast, consisting of skin thickening and breast edema. Patient reported having pain, erythema, fever and shaking chills, slightly better with antibiotics.\par 06/22/17 Right central breast, sonoguided core biopsy showed  invasive ductal carcinoma, SBR 6/9, ER 95%, NH 30% and HER 2 negative and DCIS solid pattern with intermediate grade nuclear atypia.\par 06/26/17 MRI of the breast showed a recently diagnosed right breast malignancy, with diffuse skin thickening and asymmetric parenchymal enhancement. A 5 mm enhancing nodule  in the right upper outer breast, possibly representing intramammary lymph node. No suspicious findings in the left breast.\par 06/27/17 CT scan of C/A/P showed an irregular right sided breast thickening compatible with biopsy proven right breast cancer. Osseous metastases involving the T10, T11 and L4 vertebral bodies. Indeterminate 2 cm left adrenal nodule.\par 06/27/17 Bone scan showed abnormal bone scan compatible with multifocal osseous metastases in the thoracolumbar spine, left iliac bone and in the right acetabulum.\par 7/14/17 BSO \par 7/21/17 Started letrozole and Ibrance \par 7/2017 Xgeva started every 3 months and then decreased to every 6 months due to good cancer control [de-identified] : Invasive ductal carcinoma, ER 95%, IL 30% and HER 2 negative [de-identified] : Nasrin continues to do well on letrozole and Ibrance which she has been on since 7/17.\par She is overall doing well, but finds the arthralgias continue to be an issue and at this time she is particularly struggling with pain in her ankles and feet. She was evaluated by an orthopedist who prescribed her an ASO brace with instructions to take NSAIDs as needed for pain. She reports using the NSAIDs very rarely as she does not like to take too many medications. to wear  She was previously changed to  to Femara from letrozole in mid 2020 with no significant change in her arthralgia. She has been taking glucosamine chondroitin for a while and is not sure if that has made a difference.\par She has not any mouth sores in the past few months and  has not needed to use the dexamethasone elixir.\par She still gets some hot flashes, but they are overall very manageable. \par She had an episodes of right breast mastitis 7/2020 and immediately started Keflex with resolutions of her symptoms in 3 days. No recurrence since then. \par Overall she is doing well with good energy and appetite. Sleep is still an issue, but she tries to power through the day.\par She received her second dose of COVID vaccine on 2/10/21\par \par 03/02/21 CT scan C/A/P showed grossly stable bone metastases. No other evidence for metastatic disease. Stable skin thickening of the right breast.\par 10/29/20 CT scan C/A/P showed multiple blastic bone metastases demonstrating no change since the prior exam. No other evidence of metastatic disease.\par 07/10/20 Bone scan showed osseous metastatic disease not significantly changed. Degenerative disease in the major joints. No new osseous lesions.\par 07/10/20 CT scan showed Stable exam. Sclerotic osseous metastases unchanged\par 3/11/20 CT scan showed Osseous metastatic disease, unchanged. No new foci of metastatic disease.\par 7/16/19 CT Scan showed stable examination. Sclerotic osseous metastatic disease without significant change.\par 7/16/19 Bone scan showed no significant interval change compared to the previous bone scan of 7/11/18, showing resolved or less prominent metastatic lesions from 6/27/17. No new osseous lesions are identified. 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 Stable radiographic distribution of sclerotic metastases. \par CT scan from 11/2/18: Stable with no new findings\par CT scan from 7/11/18: Stable with no new findings\par Bone scan 7/11/18 stable to improved with no new areas of disease\par CT scan from 4/9/18: Stable osseous metastases and no new findings.\par CT scan from 1/16/18: Stable osseous metastases. No radiographic evidence of visceral metastasis with a stable 2 cm indeterminate left adrenal nodule\par CT scan from 11/11/2019 Stable osseous metastases, no new areas of disease\par \par \par \par \par \par

## 2021-03-10 LAB
ALBUMIN SERPL ELPH-MCNC: 4.4 G/DL
ALP BLD-CCNC: 55 U/L
ALT SERPL-CCNC: 14 U/L
ANION GAP SERPL CALC-SCNC: 10 MMOL/L
AST SERPL-CCNC: 17 U/L
BILIRUB SERPL-MCNC: 0.2 MG/DL
BUN SERPL-MCNC: 14 MG/DL
CALCIUM SERPL-MCNC: 9.3 MG/DL
CANCER AG27-29 SERPL-ACNC: 14.6 U/ML
CEA SERPL-MCNC: 1.7 NG/ML
CHLORIDE SERPL-SCNC: 106 MMOL/L
CO2 SERPL-SCNC: 25 MMOL/L
CREAT SERPL-MCNC: 0.89 MG/DL
GLUCOSE SERPL-MCNC: 97 MG/DL
POTASSIUM SERPL-SCNC: 4.9 MMOL/L
PROT SERPL-MCNC: 6.7 G/DL
SODIUM SERPL-SCNC: 141 MMOL/L

## 2021-03-11 NOTE — CONSULT LETTER
[Consult Letter:] : I had the pleasure of evaluating your patient, [unfilled]. [Please see my note below.] : Please see my note below. [Consult Closing:] : Thank you very much for allowing me to participate in the care of this patient.  If you have any questions, please do not hesitate to contact me. [Sincerely,] : Sincerely, [FreeTextEntry3] : Jean Pierre Alan, DO\par Foot and Ankle Surgery\par

## 2021-03-11 NOTE — HISTORY OF PRESENT ILLNESS
[FreeTextEntry1] : 55 year old female presenting with left foot pain. The patient’s pain is noted to be a 3/10. The patient's pain began in 2019. The patient cannot attribute their pain to any specific injury, fall, or trauma. The patient describes their pain as achy, burning, and is made worse by walking and standing. She c/o the alignment to the great toe as well. The patient c/o swelling. The patient has been attending physical therapy. She is currently on medication for breast cancer. No other complaints at this time.

## 2021-03-11 NOTE — DISCUSSION/SUMMARY
[de-identified] : Today I had a lengthy discussion with the patient regarding their left foot pain. I have addressed all the patient's concerns surrounding the pathology of their condition. A discussion was had about utilizing custom orthotics. The patient was educated about custom orthotics in the office today and provided with a prescription. A discussion was had about shoe wear modifications.  I recommended the patient utilize an ASO brace. The patient was provided with the ASO brace in the office today. I recommend that the patient utilize NSAIDs PRN. I advised the patient to follow up with a neurologist as well for further evaluation. I would like to see the patient back in the office PRN to reassess their condition. The patient understood and verbally agreed to the treatment plan. All of their questions were answered and they were satisfied with the visit. The patient should call the office if they have any questions or experience worsening symptoms.

## 2021-03-11 NOTE — ADDENDUM
[FreeTextEntry1] : I, Edu Sandoval, acted solely as a scribe for Dr. Jean Pierre Alan on this date 03/05/2021 .\par All medical record entries made by the Scribe were at my, Dr. Jean Pierre Alan, direction and personally dictated by me on 03/05/2021 . I have reviewed the chart and agree that the record accurately reflects my personal performance of the history, physical exam, assessment and plan. I have also personally directed, reviewed, and agreed with the chart.

## 2021-03-11 NOTE — PHYSICAL EXAM
[de-identified] : General: Alert and oriented x3. In no acute distress. Pleasant in nature with a normal affect. No apparent respiratory distress.\par \par L Foot Exam\par Skin: Clean, dry, intact\par Inspection: +Hallux varus. No masses, no swelling, no effusion\par Pulses: 2+ DP/PT pulses\par ROM: FOOT Full ROM of digits, ANKLE 10 degrees of dorsiflexion, 40 degrees of plantarflexion, 10 degrees of subtalar motion.\par Painful ROM: None\par Tenderness: +posterior tibial tendon. No tenderness over the medial malleolus, No tenderness over the lateral malleolus, no CFL/ATFL/PTFL pain, no deltoid ligament pain. No heel pain. No Achilles tenderness. No 5th metatarsal pain. No pain to the LisFranc joint. \par Stability: Negative anterior/posterior drawer.\par Strength: 5/5 ADD/ABD/TA/GS/EHL/FHL/EDL\par Neuro: Sensation in tact to light touch throughout\par Additional tests: Negative Mortons test, negative tarsal tunnel tinels, negative single heel rise. \par \par Left Big Toe Exam\par ROM Great toe: 30 degrees of dorsiflexion, 40 degrees of plantarflexion.  [de-identified] : 3V of the left foot were ordered obtained and reviewed by me today, 03/05/2021 , revealed: Hallux varus

## 2021-03-16 ENCOUNTER — TRANSCRIPTION ENCOUNTER (OUTPATIENT)
Age: 56
End: 2021-03-16

## 2021-04-05 ENCOUNTER — TRANSCRIPTION ENCOUNTER (OUTPATIENT)
Age: 56
End: 2021-04-05

## 2021-05-07 ENCOUNTER — OUTPATIENT (OUTPATIENT)
Dept: OUTPATIENT SERVICES | Facility: HOSPITAL | Age: 56
LOS: 1 days | Discharge: HOME | End: 2021-05-07

## 2021-05-07 DIAGNOSIS — Z98.891 HISTORY OF UTERINE SCAR FROM PREVIOUS SURGERY: Chronic | ICD-10-CM

## 2021-05-07 DIAGNOSIS — Z90.49 ACQUIRED ABSENCE OF OTHER SPECIFIED PARTS OF DIGESTIVE TRACT: Chronic | ICD-10-CM

## 2021-05-07 DIAGNOSIS — C50.911 MALIGNANT NEOPLASM OF UNSPECIFIED SITE OF RIGHT FEMALE BREAST: ICD-10-CM

## 2021-05-11 ENCOUNTER — APPOINTMENT (OUTPATIENT)
Dept: HEMATOLOGY ONCOLOGY | Facility: CLINIC | Age: 56
End: 2021-05-11
Payer: MEDICARE

## 2021-05-11 ENCOUNTER — RESULT REVIEW (OUTPATIENT)
Age: 56
End: 2021-05-11

## 2021-05-11 ENCOUNTER — APPOINTMENT (OUTPATIENT)
Dept: INFUSION THERAPY | Facility: HOSPITAL | Age: 56
End: 2021-05-11

## 2021-05-11 VITALS
SYSTOLIC BLOOD PRESSURE: 117 MMHG | TEMPERATURE: 97.2 F | HEIGHT: 67 IN | HEART RATE: 97 BPM | OXYGEN SATURATION: 98 % | DIASTOLIC BLOOD PRESSURE: 81 MMHG | RESPIRATION RATE: 18 BRPM | BODY MASS INDEX: 34.95 KG/M2 | WEIGHT: 222.66 LBS

## 2021-05-11 DIAGNOSIS — C79.51 SECONDARY MALIGNANT NEOPLASM OF BONE: ICD-10-CM

## 2021-05-11 LAB
BASOPHILS # BLD AUTO: 0.02 K/UL — SIGNIFICANT CHANGE UP (ref 0–0.2)
BASOPHILS NFR BLD AUTO: 1 % — SIGNIFICANT CHANGE UP (ref 0–2)
EOSINOPHIL # BLD AUTO: 0.02 K/UL — SIGNIFICANT CHANGE UP (ref 0–0.5)
EOSINOPHIL NFR BLD AUTO: 1 % — SIGNIFICANT CHANGE UP (ref 0–6)
HCT VFR BLD CALC: 38.6 % — SIGNIFICANT CHANGE UP (ref 34.5–45)
HGB BLD-MCNC: 13.1 G/DL — SIGNIFICANT CHANGE UP (ref 11.5–15.5)
LYMPHOCYTES # BLD AUTO: 0.73 K/UL — LOW (ref 1–3.3)
LYMPHOCYTES # BLD AUTO: 37 % — SIGNIFICANT CHANGE UP (ref 13–44)
MCHC RBC-ENTMCNC: 33.9 G/DL — SIGNIFICANT CHANGE UP (ref 32–36)
MCHC RBC-ENTMCNC: 34.6 PG — HIGH (ref 27–34)
MCV RBC AUTO: 101.8 FL — HIGH (ref 80–100)
MONOCYTES # BLD AUTO: 0.2 K/UL — SIGNIFICANT CHANGE UP (ref 0–0.9)
MONOCYTES NFR BLD AUTO: 10 % — SIGNIFICANT CHANGE UP (ref 2–14)
NEUTROPHILS # BLD AUTO: 1.01 K/UL — LOW (ref 1.8–7.4)
NEUTROPHILS NFR BLD AUTO: 51 % — SIGNIFICANT CHANGE UP (ref 43–77)
NRBC # BLD: 1 /100 — HIGH (ref 0–0)
NRBC # BLD: SIGNIFICANT CHANGE UP /100 WBCS (ref 0–0)
PLAT MORPH BLD: NORMAL — SIGNIFICANT CHANGE UP
PLATELET # BLD AUTO: 247 K/UL — SIGNIFICANT CHANGE UP (ref 150–400)
RBC # BLD: 3.79 M/UL — LOW (ref 3.8–5.2)
RBC # FLD: 12.7 % — SIGNIFICANT CHANGE UP (ref 10.3–14.5)
RBC BLD AUTO: SIGNIFICANT CHANGE UP
WBC # BLD: 1.98 K/UL — LOW (ref 3.8–10.5)
WBC # FLD AUTO: 1.98 K/UL — LOW (ref 3.8–10.5)

## 2021-05-11 PROCEDURE — 99214 OFFICE O/P EST MOD 30 MIN: CPT

## 2021-05-11 NOTE — PHYSICAL EXAM
[Fully active, able to carry on all pre-disease performance without restriction] : Status 0 - Fully active, able to carry on all pre-disease performance without restriction [Normal] : affect appropriate [de-identified] : Right breast with increasingly normal tissue and less firmness from cancer, central mass still present but much smaller

## 2021-05-11 NOTE — HISTORY OF PRESENT ILLNESS
[Disease: _____________________] : Disease: [unfilled] [T: ___] : T[unfilled] [N: ___] : N[unfilled] [M: ___] : M[unfilled] [AJCC Stage: ____] : AJCC Stage: [unfilled] [de-identified] : Right breast cancer diagnosed at the age of 52\par 11/14 Presented with right breast mastitis and treated with antibiotics\par Multiple recurrence of mastitis over next two years, 3-4 per year with increased frequency in 2016-17\par 6/13/16 She had a 3 mm punch biopsy of the right breast which revealed interstitial neutrophilic infiltrate\par 04/25/17 Bilateral breast mammogram showed no significant interval change in the appearance of the diffuse breast edema and diffuse skin thickening in the right breast with no specific evidence of adenopathy or underlying abnormality. New skin thickening and increase parenchymal density in the left LIQ. Finding are indeterminate and biopsy was suggested. Bilateral breast ultrasound showed diffuse right sided skin thickening with focal left medial new skin thickening.\par 06/02/17 Diagnostic mammogram revealed signs of breast edema with skin thickening, progressive since prior studies. D/DX cellulitis vs. mastitis vs. inflammatory breast cancer.\par 06/03/17 Bilateral breast ultrasound again showed nonspecific and recurrent signs of inflammation in the right breast, consisting of skin thickening and breast edema. Patient reported having pain, erythema, fever and shaking chills, slightly better with antibiotics.\par 06/22/17 Right central breast, sonoguided core biopsy showed  invasive ductal carcinoma, SBR 6/9, ER 95%, CA 30% and HER 2 negative and DCIS solid pattern with intermediate grade nuclear atypia.\par 06/26/17 MRI of the breast showed a recently diagnosed right breast malignancy, with diffuse skin thickening and asymmetric parenchymal enhancement. A 5 mm enhancing nodule  in the right upper outer breast, possibly representing intramammary lymph node. No suspicious findings in the left breast.\par 06/27/17 CT scan of C/A/P showed an irregular right sided breast thickening compatible with biopsy proven right breast cancer. Osseous metastases involving the T10, T11 and L4 vertebral bodies. Indeterminate 2 cm left adrenal nodule.\par 06/27/17 Bone scan showed abnormal bone scan compatible with multifocal osseous metastases in the thoracolumbar spine, left iliac bone and in the right acetabulum.\par 7/14/17 BSO \par 7/21/17 Started letrozole and Ibrance \par 7/2017 Xgeva started every 3 months and then decreased to every 6 months due to good cancer control [de-identified] : Invasive ductal carcinoma, ER 95%, MO 30% and HER 2 negative [de-identified] : Nasrin started letrozole and Ibrance in 7/17 and continues to do well overall with some neutropenia and fatigue and arthralgias. She is in her third week of Ibrance now.\par The arthralgias are the biggest issue for her and the pain does limit her activity\par She has tried taking glucosamine, turmeric, and occasional NSAIDs but none have been very effective.\par She has not any mouth sores in the past few months and has not needed to use the dexamethasone elixir.\par She still gets some hot flashes, but they are overall mild and very manageable. \par She had an episodes of right breast mastitis 7/2020 and immediately started Keflex with resolutions of her symptoms in 3 days. No recurrence since then. \par Overall she is doing well with good energy and appetite. Sleep can be an issue at times but she is getting enough sleep.\par She received her second dose of the Moderna COVID vaccine on 2/10/21.\par \par 03/02/21 CT scan C/A/P showed grossly stable bone metastases. No other evidence for metastatic disease. Stable skin thickening of the right breast.\par 10/29/20 CT scan C/A/P showed multiple blastic bone metastases demonstrating no change since the prior exam. No other evidence of metastatic disease.\par 07/10/20 Bone scan showed osseous metastatic disease not significantly changed. Degenerative disease in the major joints. No new osseous lesions.\par 07/10/20 CT scan showed Stable exam. Sclerotic osseous metastases unchanged\par 3/11/20 CT scan showed Osseous metastatic disease, unchanged. No new foci of metastatic disease.\par 7/16/19 CT Scan showed stable examination. Sclerotic osseous metastatic disease without significant change.\par 7/16/19 Bone scan showed no significant interval change compared to the previous bone scan of 7/11/18, showing resolved or less prominent metastatic lesions from 6/27/17. No new osseous lesions are identified. 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 Stable radiographic distribution of sclerotic metastases. \par CT scan from 11/2/18: Stable with no new findings\par CT scan from 7/11/18: Stable with no new findings\par Bone scan 7/11/18 stable to improved with no new areas of disease\par CT scan from 4/9/18: Stable osseous metastases and no new findings.\par CT scan from 1/16/18: Stable osseous metastases. No radiographic evidence of visceral metastasis with a stable 2 cm indeterminate left adrenal nodule\par CT scan from 11/11/2019 Stable osseous metastases, no new areas of disease\par \par \par \par \par \par

## 2021-05-12 LAB
ALBUMIN SERPL ELPH-MCNC: 4.3 G/DL
ALP BLD-CCNC: 64 U/L
ALT SERPL-CCNC: 15 U/L
ANION GAP SERPL CALC-SCNC: 12 MMOL/L
AST SERPL-CCNC: 19 U/L
BILIRUB SERPL-MCNC: 0.3 MG/DL
BUN SERPL-MCNC: 20 MG/DL
CALCIUM SERPL-MCNC: 9.4 MG/DL
CANCER AG27-29 SERPL-ACNC: 15 U/ML
CEA SERPL-MCNC: 1.6 NG/ML
CHLORIDE SERPL-SCNC: 104 MMOL/L
CO2 SERPL-SCNC: 25 MMOL/L
CREAT SERPL-MCNC: 0.92 MG/DL
GLUCOSE SERPL-MCNC: 96 MG/DL
POTASSIUM SERPL-SCNC: 4.3 MMOL/L
PROT SERPL-MCNC: 6.9 G/DL
SODIUM SERPL-SCNC: 141 MMOL/L

## 2021-06-15 ENCOUNTER — RESULT REVIEW (OUTPATIENT)
Age: 56
End: 2021-06-15

## 2021-07-09 ENCOUNTER — OUTPATIENT (OUTPATIENT)
Dept: OUTPATIENT SERVICES | Facility: HOSPITAL | Age: 56
LOS: 1 days | End: 2021-07-09
Payer: MEDICARE

## 2021-07-09 ENCOUNTER — APPOINTMENT (OUTPATIENT)
Dept: NUCLEAR MEDICINE | Facility: IMAGING CENTER | Age: 56
End: 2021-07-09
Payer: MEDICARE

## 2021-07-09 ENCOUNTER — RESULT REVIEW (OUTPATIENT)
Age: 56
End: 2021-07-09

## 2021-07-09 ENCOUNTER — APPOINTMENT (OUTPATIENT)
Dept: CT IMAGING | Facility: IMAGING CENTER | Age: 56
End: 2021-07-09
Payer: MEDICARE

## 2021-07-09 DIAGNOSIS — C79.51 SECONDARY MALIGNANT NEOPLASM OF BONE: ICD-10-CM

## 2021-07-09 DIAGNOSIS — C50.911 MALIGNANT NEOPLASM OF UNSPECIFIED SITE OF RIGHT FEMALE BREAST: ICD-10-CM

## 2021-07-09 DIAGNOSIS — Z90.49 ACQUIRED ABSENCE OF OTHER SPECIFIED PARTS OF DIGESTIVE TRACT: Chronic | ICD-10-CM

## 2021-07-09 DIAGNOSIS — Z98.891 HISTORY OF UTERINE SCAR FROM PREVIOUS SURGERY: Chronic | ICD-10-CM

## 2021-07-09 PROCEDURE — 74177 CT ABD & PELVIS W/CONTRAST: CPT | Mod: 26,MH

## 2021-07-09 PROCEDURE — 82565 ASSAY OF CREATININE: CPT

## 2021-07-09 PROCEDURE — G1004: CPT

## 2021-07-09 PROCEDURE — 78306 BONE IMAGING WHOLE BODY: CPT | Mod: 26,MG

## 2021-07-09 PROCEDURE — 71260 CT THORAX DX C+: CPT | Mod: 26,MG

## 2021-07-09 PROCEDURE — 78306 BONE IMAGING WHOLE BODY: CPT

## 2021-07-09 PROCEDURE — 78830 RP LOCLZJ TUM SPECT W/CT 1: CPT | Mod: 26

## 2021-07-09 PROCEDURE — 71260 CT THORAX DX C+: CPT

## 2021-07-09 PROCEDURE — 78830 RP LOCLZJ TUM SPECT W/CT 1: CPT

## 2021-07-09 PROCEDURE — 74177 CT ABD & PELVIS W/CONTRAST: CPT

## 2021-07-09 PROCEDURE — A9561: CPT

## 2021-07-14 ENCOUNTER — OUTPATIENT (OUTPATIENT)
Dept: OUTPATIENT SERVICES | Facility: HOSPITAL | Age: 56
LOS: 1 days | Discharge: HOME | End: 2021-07-14

## 2021-07-14 DIAGNOSIS — Z98.891 HISTORY OF UTERINE SCAR FROM PREVIOUS SURGERY: Chronic | ICD-10-CM

## 2021-07-14 DIAGNOSIS — Z90.49 ACQUIRED ABSENCE OF OTHER SPECIFIED PARTS OF DIGESTIVE TRACT: Chronic | ICD-10-CM

## 2021-07-14 DIAGNOSIS — C50.911 MALIGNANT NEOPLASM OF UNSPECIFIED SITE OF RIGHT FEMALE BREAST: ICD-10-CM

## 2021-07-15 ENCOUNTER — RESULT REVIEW (OUTPATIENT)
Age: 56
End: 2021-07-15

## 2021-07-15 ENCOUNTER — APPOINTMENT (OUTPATIENT)
Dept: HEMATOLOGY ONCOLOGY | Facility: CLINIC | Age: 56
End: 2021-07-15
Payer: MEDICARE

## 2021-07-15 VITALS
WEIGHT: 218.25 LBS | OXYGEN SATURATION: 98 % | DIASTOLIC BLOOD PRESSURE: 63 MMHG | BODY MASS INDEX: 34.26 KG/M2 | TEMPERATURE: 97.1 F | HEIGHT: 67 IN | RESPIRATION RATE: 17 BRPM | SYSTOLIC BLOOD PRESSURE: 93 MMHG | HEART RATE: 92 BPM

## 2021-07-15 DIAGNOSIS — L65.9 NONSCARRING HAIR LOSS, UNSPECIFIED: ICD-10-CM

## 2021-07-15 LAB
ALBUMIN SERPL ELPH-MCNC: 4.5 G/DL
ALP BLD-CCNC: 60 U/L
ALT SERPL-CCNC: 17 U/L
ANION GAP SERPL CALC-SCNC: 13 MMOL/L
AST SERPL-CCNC: 17 U/L
BASOPHILS # BLD AUTO: 0.02 K/UL — SIGNIFICANT CHANGE UP (ref 0–0.2)
BASOPHILS NFR BLD AUTO: 1 % — SIGNIFICANT CHANGE UP (ref 0–2)
BILIRUB SERPL-MCNC: 0.4 MG/DL
BUN SERPL-MCNC: 20 MG/DL
CALCIUM SERPL-MCNC: 9.9 MG/DL
CEA SERPL-MCNC: 1.6 NG/ML
CHLORIDE SERPL-SCNC: 103 MMOL/L
CO2 SERPL-SCNC: 23 MMOL/L
CREAT SERPL-MCNC: 0.87 MG/DL
EOSINOPHIL # BLD AUTO: 0.04 K/UL — SIGNIFICANT CHANGE UP (ref 0–0.5)
EOSINOPHIL NFR BLD AUTO: 2 % — SIGNIFICANT CHANGE UP (ref 0–6)
GLUCOSE SERPL-MCNC: 89 MG/DL
HCT VFR BLD CALC: 38.9 % — SIGNIFICANT CHANGE UP (ref 34.5–45)
HGB BLD-MCNC: 13.4 G/DL — SIGNIFICANT CHANGE UP (ref 11.5–15.5)
LYMPHOCYTES # BLD AUTO: 0.65 K/UL — LOW (ref 1–3.3)
LYMPHOCYTES # BLD AUTO: 33 % — SIGNIFICANT CHANGE UP (ref 13–44)
MCHC RBC-ENTMCNC: 34.4 G/DL — SIGNIFICANT CHANGE UP (ref 32–36)
MCHC RBC-ENTMCNC: 35.2 PG — HIGH (ref 27–34)
MCV RBC AUTO: 102.1 FL — HIGH (ref 80–100)
MONOCYTES # BLD AUTO: 0.18 K/UL — SIGNIFICANT CHANGE UP (ref 0–0.9)
MONOCYTES NFR BLD AUTO: 9 % — SIGNIFICANT CHANGE UP (ref 2–14)
NEUTROPHILS # BLD AUTO: 1.08 K/UL — LOW (ref 1.8–7.4)
NEUTROPHILS NFR BLD AUTO: 55 % — SIGNIFICANT CHANGE UP (ref 43–77)
NRBC # BLD: 0 /100 — SIGNIFICANT CHANGE UP (ref 0–0)
NRBC # BLD: SIGNIFICANT CHANGE UP /100 WBCS (ref 0–0)
PLAT MORPH BLD: NORMAL — SIGNIFICANT CHANGE UP
PLATELET # BLD AUTO: 146 K/UL — LOW (ref 150–400)
POTASSIUM SERPL-SCNC: 4.8 MMOL/L
PROT SERPL-MCNC: 7 G/DL
RBC # BLD: 3.81 M/UL — SIGNIFICANT CHANGE UP (ref 3.8–5.2)
RBC # FLD: 12.2 % — SIGNIFICANT CHANGE UP (ref 10.3–14.5)
RBC BLD AUTO: SIGNIFICANT CHANGE UP
SODIUM SERPL-SCNC: 139 MMOL/L
WBC # BLD: 1.97 K/UL — LOW (ref 3.8–10.5)
WBC # FLD AUTO: 1.97 K/UL — LOW (ref 3.8–10.5)

## 2021-07-15 PROCEDURE — 99214 OFFICE O/P EST MOD 30 MIN: CPT

## 2021-07-15 NOTE — HISTORY OF PRESENT ILLNESS
Dr. Chávez Stabs here rounding on patient. Order placed to give 1 unit of PRBC's. Also verbal order given ok to place on general diet post port placement.  Patient aware and agreeable [Disease: _____________________] : Disease: [unfilled] [T: ___] : T[unfilled] [N: ___] : N[unfilled] [M: ___] : M[unfilled] [AJCC Stage: ____] : AJCC Stage: [unfilled] [de-identified] : Right breast cancer diagnosed at the age of 52\par 11/14 Presented with right breast mastitis and treated with antibiotics\par Multiple recurrence of mastitis over next two years, 3-4 per year with increased frequency in 2016-17\par 6/13/16 She had a 3 mm punch biopsy of the right breast which revealed interstitial neutrophilic infiltrate\par 04/25/17 Bilateral breast mammogram showed no significant interval change in the appearance of the diffuse breast edema and diffuse skin thickening in the right breast with no specific evidence of adenopathy or underlying abnormality. New skin thickening and increase parenchymal density in the left LIQ. Finding are indeterminate and biopsy was suggested. Bilateral breast ultrasound showed diffuse right sided skin thickening with focal left medial new skin thickening.\par 06/02/17 Diagnostic mammogram revealed signs of breast edema with skin thickening, progressive since prior studies. D/DX cellulitis vs. mastitis vs. inflammatory breast cancer.\par 06/03/17 Bilateral breast ultrasound again showed nonspecific and recurrent signs of inflammation in the right breast, consisting of skin thickening and breast edema. Patient reported having pain, erythema, fever and shaking chills, slightly better with antibiotics.\par 06/22/17 Right central breast, sonoguided core biopsy showed  invasive ductal carcinoma, SBR 6/9, ER 95%, [place] 30% and HER 2 negative and DCIS solid pattern with intermediate grade nuclear atypia.\par 06/26/17 MRI of the breast showed a recently diagnosed right breast malignancy, with diffuse skin thickening and asymmetric parenchymal enhancement. A 5 mm enhancing nodule  in the right upper outer breast, possibly representing intramammary lymph node. No suspicious findings in the left breast.\par 06/27/17 CT scan of C/A/P showed an irregular right sided breast thickening compatible with biopsy proven right breast cancer. Osseous metastases involving the T10, T11 and L4 vertebral bodies. Indeterminate 2 cm left adrenal nodule.\par 06/27/17 Bone scan showed abnormal bone scan compatible with multifocal osseous metastases in the thoracolumbar spine, left iliac bone and in the right acetabulum.\par 7/14/17 BSO \par 7/21/17 Started letrozole and Ibrance \par 7/2017 Xgeva started every 3 months and then decreased to every 6 months due to good cancer control [de-identified] : Invasive ductal carcinoma, ER 95%, SC 30% and HER 2 negative [de-identified] : Nasrin started letrozole and Ibrance in 7/17 and continues to do well overall with some neutropenia and fatigue and arthralgias. \par The arthralgias had been the greatest issue for her but are much better since starting the Celebrex, 100 mg bid.\par She has not any mouth sores in the past few months and has not needed to use the dexamethasone elixir.\par She still gets some hot flashes, but they are overall mild and very manageable. \par She had an episodes of right breast mastitis 7/2020 and immediately started Keflex with resolutions of her symptoms in 3 days. No recurrence since then. \par Overall she is doing well with good energy and appetite. Sleep can be an issue at times but she is getting enough sleep.\par She received her second dose of the Moderna COVID vaccine on 2/10/21.\par Her older daughters are both in graduate school in [place] in speech pathology and hospital administration. Her younger daughter is majoring in business and going into her senior year.\par \par 07/09/21 Bone scan shows no new osseous lesions and no significant change in existing lesions. 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 No other evidence for metastatic disease. Stable skin thickening of the right breast.\par 10/29/20 CT scan C/A/P showed multiple blastic bone metastases demonstrating no change since the prior exam. No other evidence of metastatic disease.\par 07/10/20 Bone scan showed osseous metastatic disease not significantly changed. Degenerative disease in the major joints. No new osseous lesions.\par 07/10/20 CT scan showed Stable exam. Sclerotic osseous metastases unchanged\par 3/11/20 CT scan showed Osseous metastatic disease, unchanged. No new foci of metastatic disease.\par 7/16/19 CT Scan showed stable examination. Sclerotic osseous metastatic disease without significant change.\par 7/16/19 Bone scan showed no significant interval change compared to the previous bone scan of 7/11/18, showing resolved or less prominent metastatic lesions from 6/27/17. No new osseous lesions are identified. 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 Stable radiographic distribution of sclerotic metastases. \par CT scan from 11/2/18: Stable with no new findings\par CT scan from 7/11/18: Stable with no new findings\par Bone scan 7/11/18 stable to improved with no new areas of disease\par CT scan from 4/9/18: Stable osseous metastases and no new findings.\par CT scan from 1/16/18: Stable osseous metastases. No radiographic evidence of visceral metastasis with a stable 2 cm indeterminate left adrenal nodule\par CT scan from 11/11/2019 Stable osseous metastases, no new areas of disease\par \par \par \par \par \par

## 2021-07-15 NOTE — PHYSICAL EXAM
[Fully active, able to carry on all pre-disease performance without restriction] : Status 0 - Fully active, able to carry on all pre-disease performance without restriction [Normal] : affect appropriate [de-identified] : Right breast with increasingly normal tissue and less firmness from cancer, central mass still present but much smaller

## 2021-07-16 LAB — CANCER AG27-29 SERPL-ACNC: 14 U/ML

## 2021-09-14 ENCOUNTER — OUTPATIENT (OUTPATIENT)
Dept: OUTPATIENT SERVICES | Facility: HOSPITAL | Age: 56
LOS: 1 days | Discharge: HOME | End: 2021-09-14

## 2021-09-14 DIAGNOSIS — Z98.891 HISTORY OF UTERINE SCAR FROM PREVIOUS SURGERY: Chronic | ICD-10-CM

## 2021-09-14 DIAGNOSIS — C50.911 MALIGNANT NEOPLASM OF UNSPECIFIED SITE OF RIGHT FEMALE BREAST: ICD-10-CM

## 2021-09-14 DIAGNOSIS — Z90.49 ACQUIRED ABSENCE OF OTHER SPECIFIED PARTS OF DIGESTIVE TRACT: Chronic | ICD-10-CM

## 2021-09-16 ENCOUNTER — RESULT REVIEW (OUTPATIENT)
Age: 56
End: 2021-09-16

## 2021-09-16 ENCOUNTER — APPOINTMENT (OUTPATIENT)
Dept: HEMATOLOGY ONCOLOGY | Facility: CLINIC | Age: 56
End: 2021-09-16
Payer: MEDICARE

## 2021-09-16 VITALS
SYSTOLIC BLOOD PRESSURE: 122 MMHG | OXYGEN SATURATION: 98 % | BODY MASS INDEX: 34.26 KG/M2 | HEIGHT: 67.01 IN | DIASTOLIC BLOOD PRESSURE: 86 MMHG | WEIGHT: 218.26 LBS | HEART RATE: 90 BPM | TEMPERATURE: 97.6 F | RESPIRATION RATE: 17 BRPM

## 2021-09-16 LAB
BASOPHILS # BLD AUTO: 0.06 K/UL — SIGNIFICANT CHANGE UP (ref 0–0.2)
BASOPHILS NFR BLD AUTO: 2.7 % — HIGH (ref 0–2)
EOSINOPHIL # BLD AUTO: 0.04 K/UL — SIGNIFICANT CHANGE UP (ref 0–0.5)
EOSINOPHIL NFR BLD AUTO: 1.8 % — SIGNIFICANT CHANGE UP (ref 0–6)
HCT VFR BLD CALC: 39.9 % — SIGNIFICANT CHANGE UP (ref 34.5–45)
HGB BLD-MCNC: 13.8 G/DL — SIGNIFICANT CHANGE UP (ref 11.5–15.5)
IMM GRANULOCYTES NFR BLD AUTO: 0 % — SIGNIFICANT CHANGE UP (ref 0–1.5)
LYMPHOCYTES # BLD AUTO: 0.76 K/UL — LOW (ref 1–3.3)
LYMPHOCYTES # BLD AUTO: 34.5 % — SIGNIFICANT CHANGE UP (ref 13–44)
MCHC RBC-ENTMCNC: 34.6 G/DL — SIGNIFICANT CHANGE UP (ref 32–36)
MCHC RBC-ENTMCNC: 35.1 PG — HIGH (ref 27–34)
MCV RBC AUTO: 101.5 FL — HIGH (ref 80–100)
MONOCYTES # BLD AUTO: 0.17 K/UL — SIGNIFICANT CHANGE UP (ref 0–0.9)
MONOCYTES NFR BLD AUTO: 7.7 % — SIGNIFICANT CHANGE UP (ref 2–14)
NEUTROPHILS # BLD AUTO: 1.17 K/UL — LOW (ref 1.8–7.4)
NEUTROPHILS NFR BLD AUTO: 53.3 % — SIGNIFICANT CHANGE UP (ref 43–77)
NRBC # BLD: 0 /100 WBCS — SIGNIFICANT CHANGE UP (ref 0–0)
PLATELET # BLD AUTO: 167 K/UL — SIGNIFICANT CHANGE UP (ref 150–400)
RBC # BLD: 3.93 M/UL — SIGNIFICANT CHANGE UP (ref 3.8–5.2)
RBC # FLD: 12.5 % — SIGNIFICANT CHANGE UP (ref 10.3–14.5)
WBC # BLD: 2.2 K/UL — LOW (ref 3.8–10.5)
WBC # FLD AUTO: 2.2 K/UL — LOW (ref 3.8–10.5)

## 2021-09-16 PROCEDURE — 99214 OFFICE O/P EST MOD 30 MIN: CPT

## 2021-09-17 ENCOUNTER — TRANSCRIPTION ENCOUNTER (OUTPATIENT)
Age: 56
End: 2021-09-17

## 2021-09-17 LAB
ALBUMIN SERPL ELPH-MCNC: 4.4 G/DL
ALP BLD-CCNC: 57 U/L
ALT SERPL-CCNC: 20 U/L
ANION GAP SERPL CALC-SCNC: 12 MMOL/L
AST SERPL-CCNC: 17 U/L
BILIRUB SERPL-MCNC: 0.4 MG/DL
BUN SERPL-MCNC: 16 MG/DL
CALCIUM SERPL-MCNC: 9.6 MG/DL
CANCER AG27-29 SERPL-ACNC: 15.6 U/ML
CEA SERPL-MCNC: 2.1 NG/ML
CHLORIDE SERPL-SCNC: 105 MMOL/L
CO2 SERPL-SCNC: 24 MMOL/L
CREAT SERPL-MCNC: 0.94 MG/DL
GLUCOSE SERPL-MCNC: 95 MG/DL
POTASSIUM SERPL-SCNC: 4.6 MMOL/L
PROT SERPL-MCNC: 6.7 G/DL
SODIUM SERPL-SCNC: 140 MMOL/L

## 2021-09-17 NOTE — PHYSICAL EXAM
[Fully active, able to carry on all pre-disease performance without restriction] : Status 0 - Fully active, able to carry on all pre-disease performance without restriction [Normal] : affect appropriate [de-identified] : Right breast with increasingly normal tissue and less firmness from cancer, central mass still present but much smaller

## 2021-09-17 NOTE — HISTORY OF PRESENT ILLNESS
[Disease: _____________________] : Disease: [unfilled] [T: ___] : T[unfilled] [N: ___] : N[unfilled] [M: ___] : M[unfilled] [AJCC Stage: ____] : AJCC Stage: [unfilled] [de-identified] : Right breast cancer diagnosed at the age of 52\par 11/14 Presented with right breast mastitis and treated with antibiotics\par Multiple recurrence of mastitis over next two years, 3-4 per year with increased frequency in 2016-17\par 6/13/16 She had a 3 mm punch biopsy of the right breast which revealed interstitial neutrophilic infiltrate\par 04/25/17 Bilateral breast mammogram showed no significant interval change in the appearance of the diffuse breast edema and diffuse skin thickening in the right breast with no specific evidence of adenopathy or underlying abnormality. New skin thickening and increase parenchymal density in the left LIQ. Finding are indeterminate and biopsy was suggested. Bilateral breast ultrasound showed diffuse right sided skin thickening with focal left medial new skin thickening.\par 06/02/17 Diagnostic mammogram revealed signs of breast edema with skin thickening, progressive since prior studies. D/DX cellulitis vs. mastitis vs. inflammatory breast cancer.\par 06/03/17 Bilateral breast ultrasound again showed nonspecific and recurrent signs of inflammation in the right breast, consisting of skin thickening and breast edema. Patient reported having pain, erythema, fever and shaking chills, slightly better with antibiotics.\par 06/22/17 Right central breast, sonoguided core biopsy showed  invasive ductal carcinoma, SBR 6/9, ER 95%, [place] 30% and HER 2 negative and DCIS solid pattern with intermediate grade nuclear atypia.\par 06/26/17 MRI of the breast showed a recently diagnosed right breast malignancy, with diffuse skin thickening and asymmetric parenchymal enhancement. A 5 mm enhancing nodule  in the right upper outer breast, possibly representing intramammary lymph node. No suspicious findings in the left breast.\par 06/27/17 CT scan of C/A/P showed an irregular right sided breast thickening compatible with biopsy proven right breast cancer. Osseous metastases involving the T10, T11 and L4 vertebral bodies. Indeterminate 2 cm left adrenal nodule.\par 06/27/17 Bone scan showed abnormal bone scan compatible with multifocal osseous metastases in the thoracolumbar spine, left iliac bone and in the right acetabulum.\par 7/14/17 BSO \par 7/21/17 Started letrozole and Ibrance \par 7/2017 Xgeva started every 3 months and then decreased to every 6 months due to good cancer control [de-identified] : Invasive ductal carcinoma, ER 95%, [place] 30% and HER 2 negative [de-identified] : Nasrin started letrozole and Ibrance in 7/17 and continues to do well overall with some neutropenia and fatigue and arthralgias. \par The arthralgias had been the greatest issue for her but are much better since starting the Celebrex, 100 mg bid.\par She has not any mouth sores in the past few months and has not needed to use the dexamethasone elixir.\par She still gets some hot flashes, but they are overall mild and very manageable. \par She had an episodes of right breast mastitis 7/2020 and immediately started Keflex with resolutions of her symptoms in 3 days. No recurrence since then. \par Overall she is doing well with good energy and appetite. Sleep can be an issue at times but she is getting enough sleep.\par She received her second dose of the Moderna COVID vaccine on 2/10/21.\par Her older daughters are both in graduate school in [place] in speech pathology and hospital administration. Her younger daughter is majoring in business and going into her senior year.\par She and her family are going on a cruise to [place] and the [facility].\par \par 07/09/21 Bone scan shows no new osseous lesions and no significant change in existing lesions. 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 No other evidence for metastatic disease. Stable skin thickening of the right breast.\par 10/29/20 CT scan C/A/P showed multiple blastic bone metastases demonstrating no change since the prior exam. No other evidence of metastatic disease.\par 07/10/20 Bone scan showed osseous metastatic disease not significantly changed. Degenerative disease in the major joints. No new osseous lesions.\par 07/10/20 CT scan showed Stable exam. Sclerotic osseous metastases unchanged\par 3/11/20 CT scan showed Osseous metastatic disease, unchanged. No new foci of metastatic disease.\par 7/16/19 CT Scan showed stable examination. Sclerotic osseous metastatic disease without significant change.\par 7/16/19 Bone scan showed no significant interval change compared to the previous bone scan of 7/11/18, showing resolved or less prominent metastatic lesions from 6/27/17. No new osseous lesions are identified. 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 Stable radiographic distribution of sclerotic metastases. \par CT scan from 11/2/18: Stable with no new findings\par CT scan from 7/11/18: Stable with no new findings\par Bone scan 7/11/18 stable to improved with no new areas of disease\par CT scan from 4/9/18: Stable osseous metastases and no new findings.\par CT scan from 1/16/18: Stable osseous metastases. No radiographic evidence of visceral metastasis with a stable 2 cm indeterminate left adrenal nodule\par CT scan from 11/11/2019 Stable osseous metastases, no new areas of disease\par \par \par \par \par \par

## 2021-09-23 ENCOUNTER — APPOINTMENT (OUTPATIENT)
Dept: HEMATOLOGY ONCOLOGY | Facility: CLINIC | Age: 56
End: 2021-09-23

## 2021-10-28 LAB
ALBUMIN SERPL ELPH-MCNC: 4.7 G/DL
ALP BLD-CCNC: 52 U/L
ALT SERPL-CCNC: 19 U/L
ANION GAP SERPL CALC-SCNC: 12 MMOL/L
AST SERPL-CCNC: 18 U/L
BILIRUB SERPL-MCNC: 0.5 MG/DL
BUN SERPL-MCNC: 20 MG/DL
CALCIUM SERPL-MCNC: 9.6 MG/DL
CEA SERPL-MCNC: 1.5 NG/ML
CHLORIDE SERPL-SCNC: 104 MMOL/L
CO2 SERPL-SCNC: 25 MMOL/L
CREAT SERPL-MCNC: 0.92 MG/DL
GLUCOSE SERPL-MCNC: 102 MG/DL
POTASSIUM SERPL-SCNC: 4.8 MMOL/L
PROT SERPL-MCNC: 7.1 G/DL
SODIUM SERPL-SCNC: 141 MMOL/L

## 2021-11-02 ENCOUNTER — OUTPATIENT (OUTPATIENT)
Dept: OUTPATIENT SERVICES | Facility: HOSPITAL | Age: 56
LOS: 1 days | End: 2021-11-02
Payer: MEDICARE

## 2021-11-02 ENCOUNTER — APPOINTMENT (OUTPATIENT)
Dept: CT IMAGING | Facility: CLINIC | Age: 56
End: 2021-11-02
Payer: MEDICARE

## 2021-11-02 ENCOUNTER — RESULT REVIEW (OUTPATIENT)
Age: 56
End: 2021-11-02

## 2021-11-02 DIAGNOSIS — C50.911 MALIGNANT NEOPLASM OF UNSPECIFIED SITE OF RIGHT FEMALE BREAST: ICD-10-CM

## 2021-11-02 DIAGNOSIS — Z90.49 ACQUIRED ABSENCE OF OTHER SPECIFIED PARTS OF DIGESTIVE TRACT: Chronic | ICD-10-CM

## 2021-11-02 DIAGNOSIS — Z98.891 HISTORY OF UTERINE SCAR FROM PREVIOUS SURGERY: Chronic | ICD-10-CM

## 2021-11-02 PROCEDURE — G1004: CPT

## 2021-11-02 PROCEDURE — 74177 CT ABD & PELVIS W/CONTRAST: CPT | Mod: 26,MH

## 2021-11-02 PROCEDURE — 71260 CT THORAX DX C+: CPT | Mod: MG

## 2021-11-02 PROCEDURE — 74177 CT ABD & PELVIS W/CONTRAST: CPT | Mod: MH

## 2021-11-02 PROCEDURE — 71260 CT THORAX DX C+: CPT | Mod: 26,MG

## 2021-11-15 ENCOUNTER — OUTPATIENT (OUTPATIENT)
Dept: OUTPATIENT SERVICES | Facility: HOSPITAL | Age: 56
LOS: 1 days | Discharge: HOME | End: 2021-11-15

## 2021-11-15 DIAGNOSIS — C50.911 MALIGNANT NEOPLASM OF UNSPECIFIED SITE OF RIGHT FEMALE BREAST: ICD-10-CM

## 2021-11-15 DIAGNOSIS — Z90.49 ACQUIRED ABSENCE OF OTHER SPECIFIED PARTS OF DIGESTIVE TRACT: Chronic | ICD-10-CM

## 2021-11-15 DIAGNOSIS — Z98.891 HISTORY OF UTERINE SCAR FROM PREVIOUS SURGERY: Chronic | ICD-10-CM

## 2021-11-18 ENCOUNTER — APPOINTMENT (OUTPATIENT)
Dept: HEMATOLOGY ONCOLOGY | Facility: CLINIC | Age: 56
End: 2021-11-18
Payer: MEDICARE

## 2021-11-18 ENCOUNTER — RESULT REVIEW (OUTPATIENT)
Age: 56
End: 2021-11-18

## 2021-11-18 ENCOUNTER — APPOINTMENT (OUTPATIENT)
Dept: INFUSION THERAPY | Facility: HOSPITAL | Age: 56
End: 2021-11-18

## 2021-11-18 VITALS
BODY MASS INDEX: 34.9 KG/M2 | OXYGEN SATURATION: 99 % | SYSTOLIC BLOOD PRESSURE: 125 MMHG | HEART RATE: 98 BPM | TEMPERATURE: 96.8 F | DIASTOLIC BLOOD PRESSURE: 85 MMHG | RESPIRATION RATE: 17 BRPM | WEIGHT: 222.87 LBS

## 2021-11-18 DIAGNOSIS — C79.51 SECONDARY MALIGNANT NEOPLASM OF BONE: ICD-10-CM

## 2021-11-18 LAB
BASOPHILS # BLD AUTO: 0.04 K/UL — SIGNIFICANT CHANGE UP (ref 0–0.2)
BASOPHILS NFR BLD AUTO: 1.6 % — SIGNIFICANT CHANGE UP (ref 0–2)
EOSINOPHIL # BLD AUTO: 0.07 K/UL — SIGNIFICANT CHANGE UP (ref 0–0.5)
EOSINOPHIL NFR BLD AUTO: 2.7 % — SIGNIFICANT CHANGE UP (ref 0–6)
HCT VFR BLD CALC: 40.8 % — SIGNIFICANT CHANGE UP (ref 34.5–45)
HGB BLD-MCNC: 13.6 G/DL — SIGNIFICANT CHANGE UP (ref 11.5–15.5)
IMM GRANULOCYTES NFR BLD AUTO: 0 % — SIGNIFICANT CHANGE UP (ref 0–1.5)
LYMPHOCYTES # BLD AUTO: 0.73 K/UL — LOW (ref 1–3.3)
LYMPHOCYTES # BLD AUTO: 28.5 % — SIGNIFICANT CHANGE UP (ref 13–44)
MCHC RBC-ENTMCNC: 33.3 G/DL — SIGNIFICANT CHANGE UP (ref 32–36)
MCHC RBC-ENTMCNC: 34.8 PG — HIGH (ref 27–34)
MCV RBC AUTO: 104.3 FL — HIGH (ref 80–100)
MONOCYTES # BLD AUTO: 0.17 K/UL — SIGNIFICANT CHANGE UP (ref 0–0.9)
MONOCYTES NFR BLD AUTO: 6.6 % — SIGNIFICANT CHANGE UP (ref 2–14)
NEUTROPHILS # BLD AUTO: 1.55 K/UL — LOW (ref 1.8–7.4)
NEUTROPHILS NFR BLD AUTO: 60.6 % — SIGNIFICANT CHANGE UP (ref 43–77)
NRBC # BLD: 0 /100 WBCS — SIGNIFICANT CHANGE UP (ref 0–0)
PLATELET # BLD AUTO: 327 K/UL — SIGNIFICANT CHANGE UP (ref 150–400)
RBC # BLD: 3.91 M/UL — SIGNIFICANT CHANGE UP (ref 3.8–5.2)
RBC # FLD: 12.1 % — SIGNIFICANT CHANGE UP (ref 10.3–14.5)
WBC # BLD: 2.56 K/UL — LOW (ref 3.8–10.5)
WBC # FLD AUTO: 2.56 K/UL — LOW (ref 3.8–10.5)

## 2021-11-18 PROCEDURE — 99214 OFFICE O/P EST MOD 30 MIN: CPT

## 2021-11-19 LAB
ALBUMIN SERPL ELPH-MCNC: 4.6 G/DL
ALP BLD-CCNC: 67 U/L
ALT SERPL-CCNC: 19 U/L
ANION GAP SERPL CALC-SCNC: 15 MMOL/L
AST SERPL-CCNC: 19 U/L
BILIRUB SERPL-MCNC: 0.4 MG/DL
BUN SERPL-MCNC: 19 MG/DL
CALCIUM SERPL-MCNC: 9.4 MG/DL
CANCER AG27-29 SERPL-ACNC: 17.3 U/ML
CEA SERPL-MCNC: 1.8 NG/ML
CHLORIDE SERPL-SCNC: 105 MMOL/L
CO2 SERPL-SCNC: 22 MMOL/L
CREAT SERPL-MCNC: 0.94 MG/DL
GLUCOSE SERPL-MCNC: 98 MG/DL
POTASSIUM SERPL-SCNC: 4.3 MMOL/L
PROT SERPL-MCNC: 7 G/DL
SODIUM SERPL-SCNC: 142 MMOL/L

## 2021-11-22 NOTE — PHYSICAL EXAM
[Fully active, able to carry on all pre-disease performance without restriction] : Status 0 - Fully active, able to carry on all pre-disease performance without restriction [Normal] : affect appropriate [de-identified] : Right breast with increasingly normal tissue and less firmness from cancer, central mass still present but much smaller

## 2021-11-22 NOTE — HISTORY OF PRESENT ILLNESS
[Disease: _____________________] : Disease: [unfilled] [T: ___] : T[unfilled] [N: ___] : N[unfilled] [M: ___] : M[unfilled] [AJCC Stage: ____] : AJCC Stage: [unfilled] [de-identified] : Right breast cancer diagnosed at the age of 52\par 11/14 Presented with right breast mastitis and treated with antibiotics\par Multiple recurrence of mastitis over next two years, 3-4 per year with increased frequency in 2016-17\par 6/13/16 She had a 3 mm punch biopsy of the right breast which revealed interstitial neutrophilic infiltrate\par 04/25/17 Bilateral breast mammogram showed no significant interval change in the appearance of the diffuse breast edema and diffuse skin thickening in the right breast with no specific evidence of adenopathy or underlying abnormality. New skin thickening and increase parenchymal density in the left LIQ. Finding are indeterminate and biopsy was suggested. Bilateral breast ultrasound showed diffuse right sided skin thickening with focal left medial new skin thickening.\par 06/02/17 Diagnostic mammogram revealed signs of breast edema with skin thickening, progressive since prior studies. D/DX cellulitis vs. mastitis vs. inflammatory breast cancer.\par 06/03/17 Bilateral breast ultrasound again showed nonspecific and recurrent signs of inflammation in the right breast, consisting of skin thickening and breast edema. Patient reported having pain, erythema, fever and shaking chills, slightly better with antibiotics.\par 06/22/17 Right central breast, sonoguided core biopsy showed  invasive ductal carcinoma, SBR 6/9, ER 95%, [place] 30% and HER 2 negative and DCIS solid pattern with intermediate grade nuclear atypia.\par 06/26/17 MRI of the breast showed a recently diagnosed right breast malignancy, with diffuse skin thickening and asymmetric parenchymal enhancement. A 5 mm enhancing nodule  in the right upper outer breast, possibly representing intramammary lymph node. No suspicious findings in the left breast.\par 06/27/17 CT scan of C/A/P showed an irregular right sided breast thickening compatible with biopsy proven right breast cancer. Osseous metastases involving the T10, T11 and L4 vertebral bodies. Indeterminate 2 cm left adrenal nodule.\par 06/27/17 Bone scan showed abnormal bone scan compatible with multifocal osseous metastases in the thoracolumbar spine, left iliac bone and in the right acetabulum.\par 7/14/17 BSO \par 7/21/17 Started letrozole and Ibrance \par 7/2017 Xgeva started every 3 months and then decreased to every 6 months due to good cancer control [de-identified] : Invasive ductal carcinoma, ER 95%, [place] 30% and HER 2 negative [de-identified] : Nasrin started letrozole and Ibrance in 7/17 and continues to do well overall with some neutropenia and fatigue and arthralgias. \par The arthralgias had been the greatest issue for her but are much better since starting the Celebrex, 100 mg bid.\par She has not any mouth sores in the past few months and has not needed to use the dexamethasone elixir.\par She still gets some hot flashes, but they are overall mild and very manageable. \par She had an episode of right breast mastitis 7/2020 and immediately started Keflex with resolutions of her symptoms in 3 days. No recurrence since then. \par Overall she is doing well with good energy and appetite. Sleep can be an issue at times but she is getting enough sleep.\par She is fully vaccinated for COVID with the Moderna vaccine as of 2/10/21, with a booster in 10/21.\par Her older daughters are both in graduate school in [place] in speech pathology and hospital administration. Her younger daughter is majoring in business and in her senior year.\par \par 11/2/21 CT scan C/A/P showed stable sclerotic osseous metastatic lesions without significant change. No evidence of any new cancer lesions on scan\par 07/09/21 Bone scan shows no new osseous lesions and no significant change in existing lesions. 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 No other evidence for metastatic disease. Stable skin thickening of the right breast.\par 10/29/20 CT scan C/A/P showed multiple blastic bone metastases demonstrating no change since the prior exam. No other evidence of metastatic disease.\par 07/10/20 Bone scan showed osseous metastatic disease not significantly changed. Degenerative disease in the major joints. No new osseous lesions.\par 07/10/20 CT scan showed Stable exam. Sclerotic osseous metastases unchanged\par 3/11/20 CT scan showed Osseous metastatic disease, unchanged. No new foci of metastatic disease.\par 7/16/19 CT Scan showed stable examination. Sclerotic osseous metastatic disease without significant change.\par 7/16/19 Bone scan showed no significant interval change compared to the previous bone scan of 7/11/18, showing resolved or less prominent metastatic lesions from 6/27/17. No new osseous lesions are identified. 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 Stable radiographic distribution of sclerotic metastases. \par CT scan from 11/2/18: Stable with no new findings\par CT scan from 7/11/18: Stable with no new findings\par Bone scan 7/11/18 stable to improved with no new areas of disease\par CT scan from 4/9/18: Stable osseous metastases and no new findings.\par CT scan from 1/16/18: Stable osseous metastases. No radiographic evidence of visceral metastasis with a stable 2 cm indeterminate left adrenal nodule\par CT scan from 11/11/2019 Stable osseous metastases, no new areas of disease\par \par \par \par \par \par

## 2021-12-07 ENCOUNTER — NON-APPOINTMENT (OUTPATIENT)
Age: 56
End: 2021-12-07

## 2022-01-18 ENCOUNTER — OUTPATIENT (OUTPATIENT)
Dept: OUTPATIENT SERVICES | Facility: HOSPITAL | Age: 57
LOS: 1 days | Discharge: HOME | End: 2022-01-18

## 2022-01-18 ENCOUNTER — RX RENEWAL (OUTPATIENT)
Age: 57
End: 2022-01-18

## 2022-01-18 DIAGNOSIS — C50.911 MALIGNANT NEOPLASM OF UNSPECIFIED SITE OF RIGHT FEMALE BREAST: ICD-10-CM

## 2022-01-18 DIAGNOSIS — Z98.891 HISTORY OF UTERINE SCAR FROM PREVIOUS SURGERY: Chronic | ICD-10-CM

## 2022-01-18 DIAGNOSIS — Z90.49 ACQUIRED ABSENCE OF OTHER SPECIFIED PARTS OF DIGESTIVE TRACT: Chronic | ICD-10-CM

## 2022-01-20 ENCOUNTER — RESULT REVIEW (OUTPATIENT)
Age: 57
End: 2022-01-20

## 2022-01-20 ENCOUNTER — APPOINTMENT (OUTPATIENT)
Dept: HEMATOLOGY ONCOLOGY | Facility: CLINIC | Age: 57
End: 2022-01-20
Payer: MEDICARE

## 2022-01-20 VITALS
BODY MASS INDEX: 35.04 KG/M2 | WEIGHT: 223.77 LBS | SYSTOLIC BLOOD PRESSURE: 113 MMHG | TEMPERATURE: 97.2 F | RESPIRATION RATE: 17 BRPM | DIASTOLIC BLOOD PRESSURE: 76 MMHG | OXYGEN SATURATION: 96 % | HEART RATE: 93 BPM

## 2022-01-20 LAB
BASOPHILS # BLD AUTO: 0 K/UL — SIGNIFICANT CHANGE UP (ref 0–0.2)
BASOPHILS NFR BLD AUTO: 0 % — SIGNIFICANT CHANGE UP (ref 0–2)
EOSINOPHIL # BLD AUTO: 0.08 K/UL — SIGNIFICANT CHANGE UP (ref 0–0.5)
EOSINOPHIL NFR BLD AUTO: 4 % — SIGNIFICANT CHANGE UP (ref 0–6)
HCT VFR BLD CALC: 39 % — SIGNIFICANT CHANGE UP (ref 34.5–45)
HGB BLD-MCNC: 13.2 G/DL — SIGNIFICANT CHANGE UP (ref 11.5–15.5)
LYMPHOCYTES # BLD AUTO: 0.9 K/UL — LOW (ref 1–3.3)
LYMPHOCYTES # BLD AUTO: 45 % — HIGH (ref 13–44)
MCHC RBC-ENTMCNC: 33.8 G/DL — SIGNIFICANT CHANGE UP (ref 32–36)
MCHC RBC-ENTMCNC: 34.6 PG — HIGH (ref 27–34)
MCV RBC AUTO: 102.1 FL — HIGH (ref 80–100)
MONOCYTES # BLD AUTO: 0.14 K/UL — SIGNIFICANT CHANGE UP (ref 0–0.9)
MONOCYTES NFR BLD AUTO: 7 % — SIGNIFICANT CHANGE UP (ref 2–14)
NEUTROPHILS # BLD AUTO: 0.88 K/UL — LOW (ref 1.8–7.4)
NEUTROPHILS NFR BLD AUTO: 44 % — SIGNIFICANT CHANGE UP (ref 43–77)
NRBC # BLD: 0 /100 — SIGNIFICANT CHANGE UP (ref 0–0)
NRBC # BLD: SIGNIFICANT CHANGE UP /100 WBCS (ref 0–0)
PLAT MORPH BLD: NORMAL — SIGNIFICANT CHANGE UP
PLATELET # BLD AUTO: 206 K/UL — SIGNIFICANT CHANGE UP (ref 150–400)
RBC # BLD: 3.82 M/UL — SIGNIFICANT CHANGE UP (ref 3.8–5.2)
RBC # FLD: 12.3 % — SIGNIFICANT CHANGE UP (ref 10.3–14.5)
RBC BLD AUTO: SIGNIFICANT CHANGE UP
WBC # BLD: 2 K/UL — LOW (ref 3.8–10.5)
WBC # FLD AUTO: 2 K/UL — LOW (ref 3.8–10.5)

## 2022-01-20 PROCEDURE — 99214 OFFICE O/P EST MOD 30 MIN: CPT

## 2022-01-20 RX ORDER — ALPRAZOLAM 0.25 MG/1
0.25 TABLET ORAL
Qty: 30 | Refills: 0 | Status: ACTIVE | COMMUNITY
Start: 2020-06-25 | End: 1900-01-01

## 2022-01-20 NOTE — PHYSICAL EXAM
[Fully active, able to carry on all pre-disease performance without restriction] : Status 0 - Fully active, able to carry on all pre-disease performance without restriction [Normal] : affect appropriate [de-identified] : Right breast with increasingly normal tissue and less firmness from cancer, central mass still present but much smaller

## 2022-01-20 NOTE — HISTORY OF PRESENT ILLNESS
[Disease: _____________________] : Disease: [unfilled] [T: ___] : T[unfilled] [N: ___] : N[unfilled] [M: ___] : M[unfilled] [AJCC Stage: ____] : AJCC Stage: [unfilled] [de-identified] : Right breast cancer diagnosed at the age of 52\par 11/14 Presented with right breast mastitis and treated with antibiotics\par Multiple recurrence of mastitis over next two years, 3-4 per year with increased frequency in 2016-17\par 6/13/16 She had a 3 mm punch biopsy of the right breast which revealed interstitial neutrophilic infiltrate\par 04/25/17 Bilateral breast mammogram showed no significant interval change in the appearance of the diffuse breast edema and diffuse skin thickening in the right breast with no specific evidence of adenopathy or underlying abnormality. New skin thickening and increase parenchymal density in the left LIQ. Finding are indeterminate and biopsy was suggested. Bilateral breast ultrasound showed diffuse right sided skin thickening with focal left medial new skin thickening.\par 06/02/17 Diagnostic mammogram revealed signs of breast edema with skin thickening, progressive since prior studies. D/DX cellulitis vs. mastitis vs. inflammatory breast cancer.\par 06/03/17 Bilateral breast ultrasound again showed nonspecific and recurrent signs of inflammation in the right breast, consisting of skin thickening and breast edema. Patient reported having pain, erythema, fever and shaking chills, slightly better with antibiotics.\par 06/22/17 Right central breast, sonoguided core biopsy showed  invasive ductal carcinoma, SBR 6/9, ER 95%, [place] 30% and HER 2 negative and DCIS solid pattern with intermediate grade nuclear atypia.\par 06/26/17 MRI of the breast showed a recently diagnosed right breast malignancy, with diffuse skin thickening and asymmetric parenchymal enhancement. A 5 mm enhancing nodule  in the right upper outer breast, possibly representing intramammary lymph node. No suspicious findings in the left breast.\par 06/27/17 CT scan of C/A/P showed an irregular right sided breast thickening compatible with biopsy proven right breast cancer. Osseous metastases involving the T10, T11 and L4 vertebral bodies. Indeterminate 2 cm left adrenal nodule.\par 06/27/17 Bone scan showed abnormal bone scan compatible with multifocal osseous metastases in the thoracolumbar spine, left iliac bone and in the right acetabulum.\par 7/14/17 BSO \par 7/21/17 Started letrozole and Ibrance \par 7/2017 Xgeva started every 3 months and then decreased to every 6 months due to good cancer control [de-identified] : Invasive ductal carcinoma, ER 95%, [place] 30% and HER 2 negative [de-identified] : Nasrin started letrozole and Ibrance in 7/17 and continues to do well overall with some neutropenia and fatigue and arthralgias. \par The arthralgias had been the greatest issue for her but are much better since starting Celebrex 100 mg bid.\par She has not any mouth sores in the past few months and has not needed to use the dexamethasone elixir.\par She still gets some hot flashes, but they are mild and very manageable. \par She had an episode of right breast mastitis 7/2020 and then called 12/7/21 to report left mastitis, which she had not previously had. She took Keflex for 7 days with resolution of her symptoms in 3 days. Recently she had some discomfort in her right breast but the symptoms resolved after about a day without treatment.\par She has been having difficulty falling asleep and is awake for hours at night. She has been taking an OTC sleep aid which helps if she has little sleep for several nights in a row. 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 They are considering NH placement\par She is fully vaccinated for COVID with the Moderna vaccine as of 2/10/21, with a booster in 10/21.\par Her older daughters are both in graduate school in [place] in speech pathology and hospital administration. Her younger daughter is majoring in business and in her senior year.\par \par 11/2/21 CT scan C/A/P showed stable sclerotic osseous metastatic lesions without significant change. No evidence of any new cancer lesions on scan\par 07/09/21 Bone scan shows no new osseous lesions and no significant change in existing lesions. 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 No other evidence for metastatic disease. Stable skin thickening of the right breast.\par 10/29/20 CT scan C/A/P showed multiple blastic bone metastases demonstrating no change since the prior exam. No other evidence of metastatic disease.\par 07/10/20 Bone scan showed osseous metastatic disease not significantly changed. Degenerative disease in the major joints. No new osseous lesions.\par 07/10/20 CT scan showed Stable exam. Sclerotic osseous metastases unchanged\par 3/11/20 CT scan showed Osseous metastatic disease, unchanged. No new foci of metastatic disease.\par 7/16/19 CT Scan showed stable examination. Sclerotic osseous metastatic disease without significant change.\par 7/16/19 Bone scan showed no significant interval change compared to the previous bone scan of 7/11/18, showing resolved or less prominent metastatic lesions from 6/27/17. No new osseous lesions are identified. 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 Stable radiographic distribution of sclerotic metastases. \par CT scan from 11/2/18: Stable with no new findings\par CT scan from 7/11/18: Stable with no new findings\par Bone scan 7/11/18 stable to improved with no new areas of disease\par CT scan from 4/9/18: Stable osseous metastases and no new findings.\par CT scan from 1/16/18: Stable osseous metastases. No radiographic evidence of visceral metastasis with a stable 2 cm indeterminate left adrenal nodule\par CT scan from 11/11/2019 Stable osseous metastases, no new areas of disease\par \par \par \par \par \par

## 2022-01-24 LAB
ALBUMIN SERPL ELPH-MCNC: 4.6 G/DL
ALP BLD-CCNC: 68 U/L
ALT SERPL-CCNC: 17 U/L
ANION GAP SERPL CALC-SCNC: 15 MMOL/L
AST SERPL-CCNC: 19 U/L
BILIRUB SERPL-MCNC: 0.3 MG/DL
BUN SERPL-MCNC: 19 MG/DL
CALCIUM SERPL-MCNC: 9.5 MG/DL
CANCER AG27-29 SERPL-ACNC: 14.2 U/ML
CEA SERPL-MCNC: 1.6 NG/ML
CHLORIDE SERPL-SCNC: 106 MMOL/L
CO2 SERPL-SCNC: 20 MMOL/L
CREAT SERPL-MCNC: 0.87 MG/DL
GLUCOSE SERPL-MCNC: 100 MG/DL
POTASSIUM SERPL-SCNC: 5.1 MMOL/L
PROT SERPL-MCNC: 6.9 G/DL
SODIUM SERPL-SCNC: 141 MMOL/L

## 2022-01-31 ENCOUNTER — OUTPATIENT (OUTPATIENT)
Dept: OUTPATIENT SERVICES | Facility: HOSPITAL | Age: 57
LOS: 1 days | End: 2022-01-31
Payer: MEDICARE

## 2022-01-31 ENCOUNTER — APPOINTMENT (OUTPATIENT)
Dept: MRI IMAGING | Facility: CLINIC | Age: 57
End: 2022-01-31
Payer: MEDICARE

## 2022-01-31 DIAGNOSIS — Z98.891 HISTORY OF UTERINE SCAR FROM PREVIOUS SURGERY: Chronic | ICD-10-CM

## 2022-01-31 DIAGNOSIS — C50.911 MALIGNANT NEOPLASM OF UNSPECIFIED SITE OF RIGHT FEMALE BREAST: ICD-10-CM

## 2022-01-31 DIAGNOSIS — Z90.49 ACQUIRED ABSENCE OF OTHER SPECIFIED PARTS OF DIGESTIVE TRACT: Chronic | ICD-10-CM

## 2022-01-31 PROCEDURE — 77049 MRI BREAST C-+ W/CAD BI: CPT | Mod: 26,MG

## 2022-01-31 PROCEDURE — C8937: CPT

## 2022-01-31 PROCEDURE — C8908: CPT | Mod: MG

## 2022-01-31 PROCEDURE — G1004: CPT

## 2022-01-31 PROCEDURE — A9585: CPT

## 2022-03-14 ENCOUNTER — OUTPATIENT (OUTPATIENT)
Dept: OUTPATIENT SERVICES | Facility: HOSPITAL | Age: 57
LOS: 1 days | End: 2022-03-14
Payer: MEDICARE

## 2022-03-14 ENCOUNTER — APPOINTMENT (OUTPATIENT)
Dept: CT IMAGING | Facility: CLINIC | Age: 57
End: 2022-03-14
Payer: MEDICARE

## 2022-03-14 ENCOUNTER — RESULT REVIEW (OUTPATIENT)
Age: 57
End: 2022-03-14

## 2022-03-14 DIAGNOSIS — C50.911 MALIGNANT NEOPLASM OF UNSPECIFIED SITE OF RIGHT FEMALE BREAST: ICD-10-CM

## 2022-03-14 DIAGNOSIS — Z98.891 HISTORY OF UTERINE SCAR FROM PREVIOUS SURGERY: Chronic | ICD-10-CM

## 2022-03-14 DIAGNOSIS — Z90.49 ACQUIRED ABSENCE OF OTHER SPECIFIED PARTS OF DIGESTIVE TRACT: Chronic | ICD-10-CM

## 2022-03-14 PROCEDURE — 74177 CT ABD & PELVIS W/CONTRAST: CPT | Mod: 26,ME

## 2022-03-14 PROCEDURE — 82565 ASSAY OF CREATININE: CPT

## 2022-03-14 PROCEDURE — 71260 CT THORAX DX C+: CPT | Mod: 26,MG

## 2022-03-14 PROCEDURE — 74177 CT ABD & PELVIS W/CONTRAST: CPT | Mod: ME

## 2022-03-14 PROCEDURE — 71260 CT THORAX DX C+: CPT | Mod: MG

## 2022-03-14 PROCEDURE — G1004: CPT

## 2022-03-16 ENCOUNTER — OUTPATIENT (OUTPATIENT)
Dept: OUTPATIENT SERVICES | Facility: HOSPITAL | Age: 57
LOS: 1 days | Discharge: HOME | End: 2022-03-16

## 2022-03-16 DIAGNOSIS — C79.51 SECONDARY MALIGNANT NEOPLASM OF BONE: ICD-10-CM

## 2022-03-16 DIAGNOSIS — Z98.891 HISTORY OF UTERINE SCAR FROM PREVIOUS SURGERY: Chronic | ICD-10-CM

## 2022-03-16 DIAGNOSIS — Z90.49 ACQUIRED ABSENCE OF OTHER SPECIFIED PARTS OF DIGESTIVE TRACT: Chronic | ICD-10-CM

## 2022-03-16 DIAGNOSIS — C50.911 MALIGNANT NEOPLASM OF UNSPECIFIED SITE OF RIGHT FEMALE BREAST: ICD-10-CM

## 2022-03-17 ENCOUNTER — RESULT REVIEW (OUTPATIENT)
Age: 57
End: 2022-03-17

## 2022-03-17 ENCOUNTER — APPOINTMENT (OUTPATIENT)
Dept: HEMATOLOGY ONCOLOGY | Facility: CLINIC | Age: 57
End: 2022-03-17
Payer: MEDICARE

## 2022-03-17 ENCOUNTER — APPOINTMENT (OUTPATIENT)
Dept: HEMATOLOGY ONCOLOGY | Facility: CLINIC | Age: 57
End: 2022-03-17

## 2022-03-17 VITALS
DIASTOLIC BLOOD PRESSURE: 87 MMHG | HEART RATE: 85 BPM | TEMPERATURE: 97.2 F | SYSTOLIC BLOOD PRESSURE: 125 MMHG | RESPIRATION RATE: 16 BRPM | HEIGHT: 67 IN | OXYGEN SATURATION: 97 % | WEIGHT: 227.05 LBS | BODY MASS INDEX: 35.64 KG/M2

## 2022-03-17 LAB
BASOPHILS # BLD AUTO: 0.02 K/UL — SIGNIFICANT CHANGE UP (ref 0–0.2)
BASOPHILS NFR BLD AUTO: 1 % — SIGNIFICANT CHANGE UP (ref 0–2)
EOSINOPHIL # BLD AUTO: 0.06 K/UL — SIGNIFICANT CHANGE UP (ref 0–0.5)
EOSINOPHIL NFR BLD AUTO: 3 % — SIGNIFICANT CHANGE UP (ref 0–6)
HCT VFR BLD CALC: 39.9 % — SIGNIFICANT CHANGE UP (ref 34.5–45)
HGB BLD-MCNC: 13.5 G/DL — SIGNIFICANT CHANGE UP (ref 11.5–15.5)
LYMPHOCYTES # BLD AUTO: 0.75 K/UL — LOW (ref 1–3.3)
LYMPHOCYTES # BLD AUTO: 39 % — SIGNIFICANT CHANGE UP (ref 13–44)
MCHC RBC-ENTMCNC: 33.8 G/DL — SIGNIFICANT CHANGE UP (ref 32–36)
MCHC RBC-ENTMCNC: 34.2 PG — HIGH (ref 27–34)
MCV RBC AUTO: 101 FL — HIGH (ref 80–100)
MONOCYTES # BLD AUTO: 0.21 K/UL — SIGNIFICANT CHANGE UP (ref 0–0.9)
MONOCYTES NFR BLD AUTO: 11 % — SIGNIFICANT CHANGE UP (ref 2–14)
NEUTROPHILS # BLD AUTO: 0.89 K/UL — LOW (ref 1.8–7.4)
NEUTROPHILS NFR BLD AUTO: 46 % — SIGNIFICANT CHANGE UP (ref 43–77)
NRBC # BLD: 0 /100 — SIGNIFICANT CHANGE UP (ref 0–0)
NRBC # BLD: SIGNIFICANT CHANGE UP /100 WBCS (ref 0–0)
PLAT MORPH BLD: NORMAL — SIGNIFICANT CHANGE UP
PLATELET # BLD AUTO: 208 K/UL — SIGNIFICANT CHANGE UP (ref 150–400)
RBC # BLD: 3.95 M/UL — SIGNIFICANT CHANGE UP (ref 3.8–5.2)
RBC # FLD: 12 % — SIGNIFICANT CHANGE UP (ref 10.3–14.5)
RBC BLD AUTO: SIGNIFICANT CHANGE UP
WBC # BLD: 1.93 K/UL — LOW (ref 3.8–10.5)
WBC # FLD AUTO: 1.93 K/UL — LOW (ref 3.8–10.5)

## 2022-03-17 PROCEDURE — 99214 OFFICE O/P EST MOD 30 MIN: CPT

## 2022-03-17 NOTE — HISTORY OF PRESENT ILLNESS
[Disease: _____________________] : Disease: [unfilled] [T: ___] : T[unfilled] [N: ___] : N[unfilled] [M: ___] : M[unfilled] [AJCC Stage: ____] : AJCC Stage: [unfilled] [de-identified] : Right breast cancer diagnosed at the age of 52\par 11/14 Presented with right breast mastitis and treated with antibiotics\par Multiple recurrence of mastitis over next two years, 3-4 per year with increased frequency in 2016-17\par 6/13/16 She had a 3 mm punch biopsy of the right breast which revealed interstitial neutrophilic infiltrate\par 04/25/17 Bilateral breast mammogram showed no significant interval change in the appearance of the diffuse breast edema and diffuse skin thickening in the right breast with no specific evidence of adenopathy or underlying abnormality. New skin thickening and increase parenchymal density in the left LIQ. Finding are indeterminate and biopsy was suggested. Bilateral breast ultrasound showed diffuse right sided skin thickening with focal left medial new skin thickening.\par 06/02/17 Diagnostic mammogram revealed signs of breast edema with skin thickening, progressive since prior studies. D/DX cellulitis vs. mastitis vs. inflammatory breast cancer.\par 06/03/17 Bilateral breast ultrasound again showed nonspecific and recurrent signs of inflammation in the right breast, consisting of skin thickening and breast edema. Patient reported having pain, erythema, fever and shaking chills, slightly better with antibiotics.\par 06/22/17 Right central breast, sonoguided core biopsy showed  invasive ductal carcinoma, SBR 6/9, ER 95%, WV 30% and HER 2 negative and DCIS solid pattern with intermediate grade nuclear atypia.\par 06/26/17 MRI of the breast showed a recently diagnosed right breast malignancy, with diffuse skin thickening and asymmetric parenchymal enhancement. A 5 mm enhancing nodule  in the right upper outer breast, possibly representing intramammary lymph node. No suspicious findings in the left breast.\par 06/27/17 CT scan of C/A/P showed an irregular right sided breast thickening compatible with biopsy proven right breast cancer. Osseous metastases involving the T10, T11 and L4 vertebral bodies. Indeterminate 2 cm left adrenal nodule.\par 06/27/17 Bone scan showed abnormal bone scan compatible with multifocal osseous metastases in the thoracolumbar spine, left iliac bone and in the right acetabulum.\par 7/14/17 BSO \par 7/21/17 Started letrozole and Ibrance \par 7/2017 Xgeva started every 3 months and then decreased to every 6 months due to good cancer control [de-identified] : Invasive ductal carcinoma, ER 95%, [place] 30% and HER 2 negative [de-identified] : Nasrin started letrozole and Ibrance in 7/17 and continues to do well overall with some neutropenia and fatigue and arthralgias.\par The arthralgias had been the greatest issue for her but are much better since starting Celebrex 100 mg bid.\par She has not any mouth sores in the past few months and has not needed to use the dexamethasone elixir.\par She still gets some hot flashes, but they are mild and very manageable. \par She had an episode of right breast mastitis 7/2020 and then called 12/7/21 to report left mastitis, which she had not previously had. She took Keflex for 7 days with resolution of her symptoms in 3 days. No further episodes.\par She has difficulty sleeping at times and takes an OTC sleep aid as needed. 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 They are considering NH placement\par She is fully vaccinated for COVID with the Moderna vaccine as of 2/10/21, with a booster in 10/21.\par Her older daughters are both in graduate school in [place] in speech pathology and hospital administration/. Her younger daughter is majoring in business and in her senior year and will be going to [employer] next July for a one year Masters in business management.\par \par 3/14/22 CT scan C/A/P showed unchanged extent of osteoblastic osseous metastases and no new metastatic disease.\par 1/31/22 Breast MRI showed diffuse mild right breast skin thickening and parenchymal edema with no foal enhancing mass. No suspicious enhancement in the left breast. No significant axillary or internal mammary lymphadenopathy.\par 11/2/21 CT scan C/A/P showed stable sclerotic osseous metastatic lesions without significant change. No evidence of any new cancer lesions on scan.\par 07/09/21 Bone scan shows no new osseous lesions and no significant change in existing lesions. 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 No other evidence for metastatic disease. Stable skin thickening of the right breast.\par 10/29/20 CT scan C/A/P showed multiple blastic bone metastases demonstrating no change since the prior exam. No other evidence of metastatic disease.\par 07/10/20 Bone scan showed osseous metastatic disease not significantly changed. Degenerative disease in the major joints. No new osseous lesions.\par 07/10/20 CT scan showed Stable exam. Sclerotic osseous metastases unchanged\par 3/11/20 CT scan showed Osseous metastatic disease, unchanged. No new foci of metastatic disease.\par 7/16/19 CT Scan showed stable examination. Sclerotic osseous metastatic disease without significant change.\par 7/16/19 Bone scan showed no significant interval change compared to the previous bone scan of 7/11/18, showing resolved or less prominent metastatic lesions from 6/27/17. No new osseous lesions are identified. 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 Stable radiographic distribution of sclerotic metastases. \par CT scan from 11/2/18: Stable with no new findings\par CT scan from 7/11/18: Stable with no new findings\par Bone scan 7/11/18 stable to improved with no new areas of disease\par CT scan from 4/9/18: Stable osseous metastases and no new findings.\par CT scan from 1/16/18: Stable osseous metastases. No radiographic evidence of visceral metastasis with a stable 2 cm indeterminate left adrenal nodule\par CT scan from 11/11/2019 Stable osseous metastases, no new areas of disease\par \par \par \par \par \par

## 2022-03-17 NOTE — PHYSICAL EXAM
[Fully active, able to carry on all pre-disease performance without restriction] : Status 0 - Fully active, able to carry on all pre-disease performance without restriction [Normal] : affect appropriate [de-identified] : Right breast with increasingly normal tissue and less firmness from cancer, central mass still present but much smaller

## 2022-05-16 ENCOUNTER — OUTPATIENT (OUTPATIENT)
Dept: OUTPATIENT SERVICES | Facility: HOSPITAL | Age: 57
LOS: 1 days | Discharge: HOME | End: 2022-05-16

## 2022-05-16 DIAGNOSIS — Z90.49 ACQUIRED ABSENCE OF OTHER SPECIFIED PARTS OF DIGESTIVE TRACT: Chronic | ICD-10-CM

## 2022-05-16 DIAGNOSIS — Z98.891 HISTORY OF UTERINE SCAR FROM PREVIOUS SURGERY: Chronic | ICD-10-CM

## 2022-05-16 DIAGNOSIS — C50.911 MALIGNANT NEOPLASM OF UNSPECIFIED SITE OF RIGHT FEMALE BREAST: ICD-10-CM

## 2022-05-17 ENCOUNTER — NON-APPOINTMENT (OUTPATIENT)
Age: 57
End: 2022-05-17

## 2022-05-17 ENCOUNTER — APPOINTMENT (OUTPATIENT)
Dept: FAMILY MEDICINE | Facility: CLINIC | Age: 57
End: 2022-05-17
Payer: MEDICARE

## 2022-05-17 PROCEDURE — 99442: CPT | Mod: CS,95

## 2022-05-19 ENCOUNTER — RX RENEWAL (OUTPATIENT)
Age: 57
End: 2022-05-19

## 2022-05-19 ENCOUNTER — APPOINTMENT (OUTPATIENT)
Dept: HEMATOLOGY ONCOLOGY | Facility: CLINIC | Age: 57
End: 2022-05-19

## 2022-05-19 ENCOUNTER — APPOINTMENT (OUTPATIENT)
Dept: INFUSION THERAPY | Facility: HOSPITAL | Age: 57
End: 2022-05-19

## 2022-05-31 ENCOUNTER — RESULT REVIEW (OUTPATIENT)
Age: 57
End: 2022-05-31

## 2022-05-31 ENCOUNTER — APPOINTMENT (OUTPATIENT)
Dept: HEMATOLOGY ONCOLOGY | Facility: CLINIC | Age: 57
End: 2022-05-31
Payer: MEDICARE

## 2022-05-31 ENCOUNTER — APPOINTMENT (OUTPATIENT)
Dept: INFUSION THERAPY | Facility: HOSPITAL | Age: 57
End: 2022-05-31

## 2022-05-31 VITALS
OXYGEN SATURATION: 97 % | RESPIRATION RATE: 17 BRPM | TEMPERATURE: 96.8 F | BODY MASS INDEX: 35.75 KG/M2 | HEART RATE: 94 BPM | WEIGHT: 228.28 LBS | SYSTOLIC BLOOD PRESSURE: 113 MMHG | DIASTOLIC BLOOD PRESSURE: 80 MMHG

## 2022-05-31 DIAGNOSIS — C79.51 SECONDARY MALIGNANT NEOPLASM OF BONE: ICD-10-CM

## 2022-05-31 DIAGNOSIS — U07.1 COVID-19: ICD-10-CM

## 2022-05-31 LAB
BASOPHILS # BLD AUTO: 0.06 K/UL — SIGNIFICANT CHANGE UP (ref 0–0.2)
BASOPHILS NFR BLD AUTO: 1.9 % — SIGNIFICANT CHANGE UP (ref 0–2)
EOSINOPHIL # BLD AUTO: 0.06 K/UL — SIGNIFICANT CHANGE UP (ref 0–0.5)
EOSINOPHIL NFR BLD AUTO: 1.9 % — SIGNIFICANT CHANGE UP (ref 0–6)
HCT VFR BLD CALC: 39.7 % — SIGNIFICANT CHANGE UP (ref 34.5–45)
HGB BLD-MCNC: 13.3 G/DL — SIGNIFICANT CHANGE UP (ref 11.5–15.5)
IMM GRANULOCYTES NFR BLD AUTO: 0.3 % — SIGNIFICANT CHANGE UP (ref 0–1.5)
LYMPHOCYTES # BLD AUTO: 0.71 K/UL — LOW (ref 1–3.3)
LYMPHOCYTES # BLD AUTO: 22.7 % — SIGNIFICANT CHANGE UP (ref 13–44)
MCHC RBC-ENTMCNC: 33.5 G/DL — SIGNIFICANT CHANGE UP (ref 32–36)
MCHC RBC-ENTMCNC: 33.8 PG — SIGNIFICANT CHANGE UP (ref 27–34)
MCV RBC AUTO: 100.8 FL — HIGH (ref 80–100)
MONOCYTES # BLD AUTO: 0.14 K/UL — SIGNIFICANT CHANGE UP (ref 0–0.9)
MONOCYTES NFR BLD AUTO: 4.5 % — SIGNIFICANT CHANGE UP (ref 2–14)
NEUTROPHILS # BLD AUTO: 2.15 K/UL — SIGNIFICANT CHANGE UP (ref 1.8–7.4)
NEUTROPHILS NFR BLD AUTO: 68.7 % — SIGNIFICANT CHANGE UP (ref 43–77)
NRBC # BLD: 0 /100 WBCS — SIGNIFICANT CHANGE UP (ref 0–0)
PLATELET # BLD AUTO: 327 K/UL — SIGNIFICANT CHANGE UP (ref 150–400)
RBC # BLD: 3.94 M/UL — SIGNIFICANT CHANGE UP (ref 3.8–5.2)
RBC # FLD: 12 % — SIGNIFICANT CHANGE UP (ref 10.3–14.5)
WBC # BLD: 3.13 K/UL — LOW (ref 3.8–10.5)
WBC # FLD AUTO: 3.13 K/UL — LOW (ref 3.8–10.5)

## 2022-05-31 PROCEDURE — 99214 OFFICE O/P EST MOD 30 MIN: CPT | Mod: CS

## 2022-05-31 RX ORDER — CEPHALEXIN 500 MG/1
500 CAPSULE ORAL 3 TIMES DAILY
Qty: 30 | Refills: 0 | Status: ACTIVE | COMMUNITY
Start: 2017-07-19 | End: 1900-01-01

## 2022-05-31 RX ORDER — NIRMATRELVIR AND RITONAVIR 300-100 MG
20 X 150 MG & KIT ORAL
Qty: 1 | Refills: 0 | Status: DISCONTINUED | COMMUNITY
Start: 2022-05-17 | End: 2022-05-31

## 2022-06-01 ENCOUNTER — NON-APPOINTMENT (OUTPATIENT)
Age: 57
End: 2022-06-01

## 2022-06-01 PROBLEM — U07.1 COVID-19 VIRUS INFECTION: Status: ACTIVE | Noted: 2022-05-17

## 2022-06-01 LAB
ALBUMIN SERPL ELPH-MCNC: 4.5 G/DL
ALP BLD-CCNC: 64 U/L
ALT SERPL-CCNC: 23 U/L
ANION GAP SERPL CALC-SCNC: 14 MMOL/L
AST SERPL-CCNC: 19 U/L
BILIRUB SERPL-MCNC: 0.3 MG/DL
BUN SERPL-MCNC: 20 MG/DL
CALCIUM SERPL-MCNC: 10 MG/DL
CANCER AG27-29 SERPL-ACNC: 17.3 U/ML
CEA SERPL-MCNC: 2.5 NG/ML
CHLORIDE SERPL-SCNC: 106 MMOL/L
CO2 SERPL-SCNC: 23 MMOL/L
CREAT SERPL-MCNC: 0.97 MG/DL
EGFR: 69 ML/MIN/1.73M2
GLUCOSE SERPL-MCNC: 98 MG/DL
POTASSIUM SERPL-SCNC: 4.6 MMOL/L
PROT SERPL-MCNC: 7 G/DL
SODIUM SERPL-SCNC: 143 MMOL/L

## 2022-06-04 NOTE — HISTORY OF PRESENT ILLNESS
[Disease: _____________________] : Disease: [unfilled] [T: ___] : T[unfilled] [N: ___] : N[unfilled] [M: ___] : M[unfilled] [AJCC Stage: ____] : AJCC Stage: [unfilled] [de-identified] : Right breast cancer diagnosed at the age of 52\par 11/14 Presented with right breast mastitis and treated with antibiotics\par Multiple recurrence of mastitis over next two years, 3-4 per year with increased frequency in 2016-17\par 6/13/16 She had a 3 mm punch biopsy of the right breast which revealed interstitial neutrophilic infiltrate\par 04/25/17 Bilateral breast mammogram showed no significant interval change in the appearance of the diffuse breast edema and diffuse skin thickening in the right breast with no specific evidence of adenopathy or underlying abnormality. New skin thickening and increase parenchymal density in the left LIQ. Finding are indeterminate and biopsy was suggested. Bilateral breast ultrasound showed diffuse right sided skin thickening with focal left medial new skin thickening.\par 06/02/17 Diagnostic mammogram revealed signs of breast edema with skin thickening, progressive since prior studies. D/DX cellulitis vs. mastitis vs. inflammatory breast cancer.\par 06/03/17 Bilateral breast ultrasound again showed nonspecific and recurrent signs of inflammation in the right breast, consisting of skin thickening and breast edema. Patient reported having pain, erythema, fever and shaking chills, slightly better with antibiotics.\par 06/22/17 Right central breast, sonoguided core biopsy showed  invasive ductal carcinoma, SBR 6/9, ER 95%, [place] 30% and HER 2 negative and DCIS solid pattern with intermediate grade nuclear atypia.\par 06/26/17 MRI of the breast showed a recently diagnosed right breast malignancy, with diffuse skin thickening and asymmetric parenchymal enhancement. A 5 mm enhancing nodule  in the right upper outer breast, possibly representing intramammary lymph node. No suspicious findings in the left breast.\par 06/27/17 CT scan of C/A/P showed an irregular right sided breast thickening compatible with biopsy proven right breast cancer. Osseous metastases involving the T10, T11 and L4 vertebral bodies. Indeterminate 2 cm left adrenal nodule.\par 06/27/17 Bone scan showed abnormal bone scan compatible with multifocal osseous metastases in the thoracolumbar spine, left iliac bone and in the right acetabulum.\par 7/14/17 BSO \par 7/21/17 Started letrozole and Ibrance \par 7/2017 Xgeva started every 3 months and then decreased to every 6 months due to good cancer control [de-identified] : Invasive ductal carcinoma, ER 95%, [place] 30% and HER 2 negative [de-identified] : Nasrin started letrozole and Ibrance in 7/17 and continues to do well overall with some neutropenia and fatigue and arthralgias.\par The arthralgias had been the greatest issue for her but are much better since starting Celebrex 100 mg bid.\par She has not any mouth sores in the past few months and has not needed to use the dexamethasone elixir.\par She still gets some hot flashes, but they are mild and very manageable. \par She is currently on her second week of Ibrance, reports skipping a few days when she had COVID  about 2 weeks ago for which she received monoclonal antibody. She is overall doing well no acute complaints at this time.\par She had an episode of right breast mastitis 7/2020 and then called 12/7/21 to report left mastitis, which she had not previously had. She took Keflex for 7 days with resolution of her symptoms in 3 days. No further episodes. She has difficulty sleeping at times and takes an OTC sleep aid as needed. 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 They are considering NH placement\par She is fully vaccinated for COVID with the Moderna vaccine as of 2/10/21, with a booster in 10/21 and  03/2022 \par \par Her older daughters are both in graduate school in [place] in speech pathology and hospital administration/. Her younger daughter is majoring in business and in her senior year and will be going to [place] [employer] next July for a one year Masters in business management.\par \par 3/14/22 CT scan C/A/P showed unchanged extent of osteoblastic osseous metastases and no new metastatic disease.\par 1/31/22 Breast MRI showed diffuse mild right breast skin thickening and parenchymal edema with no foal enhancing mass. No suspicious enhancement in the left breast. No significant axillary or internal mammary lymphadenopathy.\par 11/2/21 CT scan C/A/P showed stable sclerotic osseous metastatic lesions without significant change. No evidence of any new cancer lesions on scan.\par 07/09/21 Bone scan shows no new osseous lesions and no significant change in existing lesions. 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 No other evidence for metastatic disease. Stable skin thickening of the right breast.\par 10/29/20 CT scan C/A/P showed multiple blastic bone metastases demonstrating no change since the prior exam. No other evidence of metastatic disease.\par 07/10/20 Bone scan showed osseous metastatic disease not significantly changed. Degenerative disease in the major joints. No new osseous lesions.\par 07/10/20 CT scan showed Stable exam. Sclerotic osseous metastases unchanged\par 3/11/20 CT scan showed Osseous metastatic disease, unchanged. No new foci of metastatic disease.\par 7/16/19 CT Scan showed stable examination. Sclerotic osseous metastatic disease without significant change.\par 7/16/19 Bone scan showed no significant interval change compared to the previous bone scan of 7/11/18, showing resolved or less prominent metastatic lesions from 6/27/17. No new osseous lesions are identified. 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 Stable radiographic distribution of sclerotic metastases. \par CT scan from 11/2/18: Stable with no new findings\par CT scan from 7/11/18: Stable with no new findings\par Bone scan 7/11/18 stable to improved with no new areas of disease\par CT scan from 4/9/18: Stable osseous metastases and no new findings.\par CT scan from 1/16/18: Stable osseous metastases. No radiographic evidence of visceral metastasis with a stable 2 cm indeterminate left adrenal nodule\par CT scan from 11/11/2019 Stable osseous metastases, no new areas of disease\par \par \par \par \par \par

## 2022-06-04 NOTE — PHYSICAL EXAM
[Fully active, able to carry on all pre-disease performance without restriction] : Status 0 - Fully active, able to carry on all pre-disease performance without restriction [Normal] : affect appropriate [de-identified] : Right breast with increasingly normal tissue and less firmness from cancer, central mass still present but much smaller

## 2022-07-08 ENCOUNTER — OUTPATIENT (OUTPATIENT)
Dept: OUTPATIENT SERVICES | Facility: HOSPITAL | Age: 57
LOS: 1 days | Discharge: HOME | End: 2022-07-08

## 2022-07-08 DIAGNOSIS — C50.911 MALIGNANT NEOPLASM OF UNSPECIFIED SITE OF RIGHT FEMALE BREAST: ICD-10-CM

## 2022-07-08 DIAGNOSIS — Z90.49 ACQUIRED ABSENCE OF OTHER SPECIFIED PARTS OF DIGESTIVE TRACT: Chronic | ICD-10-CM

## 2022-07-08 DIAGNOSIS — Z98.891 HISTORY OF UTERINE SCAR FROM PREVIOUS SURGERY: Chronic | ICD-10-CM

## 2022-07-11 ENCOUNTER — RESULT REVIEW (OUTPATIENT)
Age: 57
End: 2022-07-11

## 2022-07-14 ENCOUNTER — APPOINTMENT (OUTPATIENT)
Dept: NUCLEAR MEDICINE | Facility: IMAGING CENTER | Age: 57
End: 2022-07-14

## 2022-07-14 ENCOUNTER — OUTPATIENT (OUTPATIENT)
Dept: OUTPATIENT SERVICES | Facility: HOSPITAL | Age: 57
LOS: 1 days | End: 2022-07-14
Payer: MEDICARE

## 2022-07-14 ENCOUNTER — APPOINTMENT (OUTPATIENT)
Dept: CT IMAGING | Facility: IMAGING CENTER | Age: 57
End: 2022-07-14

## 2022-07-14 DIAGNOSIS — Z98.891 HISTORY OF UTERINE SCAR FROM PREVIOUS SURGERY: Chronic | ICD-10-CM

## 2022-07-14 DIAGNOSIS — C50.911 MALIGNANT NEOPLASM OF UNSPECIFIED SITE OF RIGHT FEMALE BREAST: ICD-10-CM

## 2022-07-14 DIAGNOSIS — Z90.49 ACQUIRED ABSENCE OF OTHER SPECIFIED PARTS OF DIGESTIVE TRACT: Chronic | ICD-10-CM

## 2022-07-14 PROCEDURE — 78306 BONE IMAGING WHOLE BODY: CPT | Mod: 26,MH

## 2022-07-14 PROCEDURE — A9561: CPT

## 2022-07-14 PROCEDURE — 71260 CT THORAX DX C+: CPT

## 2022-07-14 PROCEDURE — 74177 CT ABD & PELVIS W/CONTRAST: CPT | Mod: 26,MH

## 2022-07-14 PROCEDURE — 71260 CT THORAX DX C+: CPT | Mod: 26,MH

## 2022-07-14 PROCEDURE — 74177 CT ABD & PELVIS W/CONTRAST: CPT

## 2022-07-14 PROCEDURE — 78306 BONE IMAGING WHOLE BODY: CPT

## 2022-07-21 ENCOUNTER — APPOINTMENT (OUTPATIENT)
Dept: HEMATOLOGY ONCOLOGY | Facility: CLINIC | Age: 57
End: 2022-07-21

## 2022-07-21 ENCOUNTER — RESULT REVIEW (OUTPATIENT)
Age: 57
End: 2022-07-21

## 2022-07-21 VITALS
RESPIRATION RATE: 16 BRPM | WEIGHT: 226.19 LBS | HEART RATE: 92 BPM | DIASTOLIC BLOOD PRESSURE: 83 MMHG | OXYGEN SATURATION: 96 % | HEIGHT: 67 IN | TEMPERATURE: 96.8 F | BODY MASS INDEX: 35.5 KG/M2 | SYSTOLIC BLOOD PRESSURE: 118 MMHG

## 2022-07-21 LAB
ALBUMIN SERPL ELPH-MCNC: 4.7 G/DL
ALP BLD-CCNC: 60 U/L
ALT SERPL-CCNC: 29 U/L
ANION GAP SERPL CALC-SCNC: 10 MMOL/L
AST SERPL-CCNC: 22 U/L
BASOPHILS # BLD AUTO: 0.06 K/UL — SIGNIFICANT CHANGE UP (ref 0–0.2)
BASOPHILS NFR BLD AUTO: 2.4 % — HIGH (ref 0–2)
BILIRUB SERPL-MCNC: 0.4 MG/DL
BUN SERPL-MCNC: 20 MG/DL
CALCIUM SERPL-MCNC: 9.7 MG/DL
CEA SERPL-MCNC: 2.4 NG/ML
CHLORIDE SERPL-SCNC: 106 MMOL/L
CO2 SERPL-SCNC: 24 MMOL/L
CREAT SERPL-MCNC: 1.05 MG/DL
EGFR: 62 ML/MIN/1.73M2
EOSINOPHIL # BLD AUTO: 0.04 K/UL — SIGNIFICANT CHANGE UP (ref 0–0.5)
EOSINOPHIL NFR BLD AUTO: 1.6 % — SIGNIFICANT CHANGE UP (ref 0–6)
GLUCOSE SERPL-MCNC: 100 MG/DL
HCT VFR BLD CALC: 39.3 % — SIGNIFICANT CHANGE UP (ref 34.5–45)
HGB BLD-MCNC: 13.5 G/DL — SIGNIFICANT CHANGE UP (ref 11.5–15.5)
IMM GRANULOCYTES NFR BLD AUTO: 0 % — SIGNIFICANT CHANGE UP (ref 0–1.5)
LYMPHOCYTES # BLD AUTO: 0.92 K/UL — LOW (ref 1–3.3)
LYMPHOCYTES # BLD AUTO: 36.4 % — SIGNIFICANT CHANGE UP (ref 13–44)
MCHC RBC-ENTMCNC: 34.4 G/DL — SIGNIFICANT CHANGE UP (ref 32–36)
MCHC RBC-ENTMCNC: 34.5 PG — HIGH (ref 27–34)
MCV RBC AUTO: 100.5 FL — HIGH (ref 80–100)
MONOCYTES # BLD AUTO: 0.3 K/UL — SIGNIFICANT CHANGE UP (ref 0–0.9)
MONOCYTES NFR BLD AUTO: 11.9 % — SIGNIFICANT CHANGE UP (ref 2–14)
NEUTROPHILS # BLD AUTO: 1.21 K/UL — LOW (ref 1.8–7.4)
NEUTROPHILS NFR BLD AUTO: 47.7 % — SIGNIFICANT CHANGE UP (ref 43–77)
NRBC # BLD: 0 /100 WBCS — SIGNIFICANT CHANGE UP (ref 0–0)
PLATELET # BLD AUTO: 147 K/UL — LOW (ref 150–400)
POTASSIUM SERPL-SCNC: 5.5 MMOL/L
PROT SERPL-MCNC: 7.1 G/DL
RBC # BLD: 3.91 M/UL — SIGNIFICANT CHANGE UP (ref 3.8–5.2)
RBC # FLD: 13.3 % — SIGNIFICANT CHANGE UP (ref 10.3–14.5)
SODIUM SERPL-SCNC: 141 MMOL/L
WBC # BLD: 2.53 K/UL — LOW (ref 3.8–10.5)
WBC # FLD AUTO: 2.53 K/UL — LOW (ref 3.8–10.5)

## 2022-07-21 PROCEDURE — 99214 OFFICE O/P EST MOD 30 MIN: CPT

## 2022-07-21 NOTE — HISTORY OF PRESENT ILLNESS
[Disease: _____________________] : Disease: [unfilled] [T: ___] : T[unfilled] [N: ___] : N[unfilled] [M: ___] : M[unfilled] [AJCC Stage: ____] : AJCC Stage: [unfilled] [de-identified] : Right breast cancer diagnosed at the age of 52\par 11/14 Presented with right breast mastitis and treated with antibiotics\par Multiple recurrence of mastitis over next two years, 3-4 per year with increased frequency in 2016-17\par 6/13/16 She had a 3 mm punch biopsy of the right breast which revealed interstitial neutrophilic infiltrate\par 04/25/17 Bilateral breast mammogram showed no significant interval change in the appearance of the diffuse breast edema and diffuse skin thickening in the right breast with no specific evidence of adenopathy or underlying abnormality. New skin thickening and increase parenchymal density in the left LIQ. Finding are indeterminate and biopsy was suggested. Bilateral breast ultrasound showed diffuse right sided skin thickening with focal left medial new skin thickening.\par 06/02/17 Diagnostic mammogram revealed signs of breast edema with skin thickening, progressive since prior studies. D/DX cellulitis vs. mastitis vs. inflammatory breast cancer.\par 06/03/17 Bilateral breast ultrasound again showed nonspecific and recurrent signs of inflammation in the right breast, consisting of skin thickening and breast edema. Patient reported having pain, erythema, fever and shaking chills, slightly better with antibiotics.\par 06/22/17 Right central breast, sonoguided core biopsy showed  invasive ductal carcinoma, SBR 6/9, ER 95%, LA 30% and HER 2 negative and DCIS solid pattern with intermediate grade nuclear atypia.\par 06/26/17 MRI of the breast showed a recently diagnosed right breast malignancy, with diffuse skin thickening and asymmetric parenchymal enhancement. A 5 mm enhancing nodule  in the right upper outer breast, possibly representing intramammary lymph node. No suspicious findings in the left breast.\par 06/27/17 CT scan of C/A/P showed an irregular right sided breast thickening compatible with biopsy proven right breast cancer. Osseous metastases involving the T10, T11 and L4 vertebral bodies. Indeterminate 2 cm left adrenal nodule.\par 06/27/17 Bone scan showed abnormal bone scan compatible with multifocal osseous metastases in the thoracolumbar spine, left iliac bone and in the right acetabulum.\par 7/14/17 BSO \par 7/21/17 Started letrozole and Ibrance \par 7/2017 Xgeva started every 3 months and then decreased to every 6 months due to good cancer control [de-identified] : Invasive ductal carcinoma, ER 95%, ID 30% and HER 2 negative [de-identified] : Nasrin started letrozole and Ibrance in 7/17 and continues to do well overall with some neutropenia and fatigue and arthralgias.\par The arthralgias had been the greatest issue for her but are much better since starting Celebrex 100 mg bid.\par She has not any mouth sores in the past few months and has not needed to use the dexamethasone elixir.\par She still gets some hot flashes, but they are mild and very manageable. \par She had an episode of right breast mastitis 7/2020 and then called 12/7/21 to report left mastitis, which she had not previously had. She took Keflex for 7 days with resolution of her symptoms in 3 days. No further episodes. \par She has difficulty sleeping at times and takes an OTC sleep aid as needed. 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 They are considering NH placement\par Her older daughters are both in graduate school in [place] in speech pathology and hospital administration/. Her younger daughter graduated in 5/22 and will be going to [employer]  for a one year Masters in business management. They will all graduate in 5/2023.\par \par 7/14/22 CT scan showed stable sclerotic bony mets and no new metastatic disease\par 7/5/22 Bone scan showed stable disease and no new lesions\par 3/14/22 CT scan C/A/P showed unchanged extent of osteoblastic osseous metastases and no new metastatic disease.\par 1/31/22 Breast MRI showed diffuse mild right breast skin thickening and parenchymal edema with no foal enhancing mass. No suspicious enhancement in the left breast. No significant axillary or internal mammary lymphadenopathy.\par 11/2/21 CT scan C/A/P showed stable sclerotic osseous metastatic lesions without significant change. No evidence of any new cancer lesions on scan.\par 07/09/21 Bone scan shows no new osseous lesions and no significant change in existing lesions. 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 No other evidence for metastatic disease. Stable skin thickening of the right breast.\par 10/29/20 CT scan C/A/P showed multiple blastic bone metastases demonstrating no change since the prior exam. No other evidence of metastatic disease.\par 07/10/20 Bone scan showed osseous metastatic disease not significantly changed. Degenerative disease in the major joints. No new osseous lesions.\par 07/10/20 CT scan showed Stable exam. Sclerotic osseous metastases unchanged\par 3/11/20 CT scan showed Osseous metastatic disease, unchanged. No new foci of metastatic disease.\par 7/16/19 CT Scan showed stable examination. Sclerotic osseous metastatic disease without significant change.\par 7/16/19 Bone scan showed no significant interval change compared to the previous bone scan of 7/11/18, showing resolved or less prominent metastatic lesions from 6/27/17. No new osseous lesions are identified. 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 Stable radiographic distribution of sclerotic metastases. \par CT scan from 11/2/18: Stable with no new findings\par CT scan from 7/11/18: Stable with no new findings\par Bone scan 7/11/18 stable to improved with no new areas of disease\par CT scan from 4/9/18: Stable osseous metastases and no new findings.\par CT scan from 1/16/18: Stable osseous metastases. No radiographic evidence of visceral metastasis with a stable 2 cm indeterminate left adrenal nodule\par CT scan from 11/11/2019 Stable osseous metastases, no new areas of disease\par \par \par \par \par \par

## 2022-07-21 NOTE — PHYSICAL EXAM
[Fully active, able to carry on all pre-disease performance without restriction] : Status 0 - Fully active, able to carry on all pre-disease performance without restriction [Normal] : affect appropriate [de-identified] : Right breast with increasingly normal tissue and less firmness from cancer, central mass still present but much smaller

## 2022-07-26 LAB — CANCER AG27-29 SERPL-ACNC: 18.6 U/ML

## 2022-09-07 NOTE — H&P PST ADULT - PT NEEDS ASSIST
Other (Free Text): A total of 4 lesions were treated with liquid nitrogen for 2 freeze-thaw cycles lasting 5 seconds, located on the right central temple and right lateral zygoma, left inferior central forehead and left central frontal scalp. The patient's consent was obtained including but not limited to risks of crusting, scabbing, blistering, scarring, darker or lighter pigmentary change, recurrence, incomplete removal and infection. Detail Level: Detailed Render Risk Assessment In Note?: no Note Text (......Xxx Chief Complaint.): This diagnosis correlates with the no

## 2022-09-16 ENCOUNTER — APPOINTMENT (OUTPATIENT)
Dept: HEMATOLOGY ONCOLOGY | Facility: CLINIC | Age: 57
End: 2022-09-16

## 2022-11-08 ENCOUNTER — OUTPATIENT (OUTPATIENT)
Dept: OUTPATIENT SERVICES | Facility: HOSPITAL | Age: 57
LOS: 1 days | Discharge: HOME | End: 2022-11-08

## 2022-11-08 DIAGNOSIS — Z90.49 ACQUIRED ABSENCE OF OTHER SPECIFIED PARTS OF DIGESTIVE TRACT: Chronic | ICD-10-CM

## 2022-11-08 DIAGNOSIS — Z98.891 HISTORY OF UTERINE SCAR FROM PREVIOUS SURGERY: Chronic | ICD-10-CM

## 2022-11-08 DIAGNOSIS — C50.911 MALIGNANT NEOPLASM OF UNSPECIFIED SITE OF RIGHT FEMALE BREAST: ICD-10-CM

## 2022-11-09 ENCOUNTER — APPOINTMENT (OUTPATIENT)
Dept: CT IMAGING | Facility: CLINIC | Age: 57
End: 2022-11-09

## 2022-11-09 ENCOUNTER — OUTPATIENT (OUTPATIENT)
Dept: OUTPATIENT SERVICES | Facility: HOSPITAL | Age: 57
LOS: 1 days | End: 2022-11-09
Payer: MEDICARE

## 2022-11-09 DIAGNOSIS — Z98.891 HISTORY OF UTERINE SCAR FROM PREVIOUS SURGERY: Chronic | ICD-10-CM

## 2022-11-09 DIAGNOSIS — C50.911 MALIGNANT NEOPLASM OF UNSPECIFIED SITE OF RIGHT FEMALE BREAST: ICD-10-CM

## 2022-11-09 DIAGNOSIS — Z90.49 ACQUIRED ABSENCE OF OTHER SPECIFIED PARTS OF DIGESTIVE TRACT: Chronic | ICD-10-CM

## 2022-11-09 PROCEDURE — 71260 CT THORAX DX C+: CPT

## 2022-11-09 PROCEDURE — G1004: CPT

## 2022-11-09 PROCEDURE — 74177 CT ABD & PELVIS W/CONTRAST: CPT | Mod: 26,MG

## 2022-11-09 PROCEDURE — 71260 CT THORAX DX C+: CPT | Mod: 26,MH

## 2022-11-09 PROCEDURE — 74177 CT ABD & PELVIS W/CONTRAST: CPT | Mod: MG

## 2022-11-30 ENCOUNTER — RESULT REVIEW (OUTPATIENT)
Age: 57
End: 2022-11-30

## 2022-11-30 ENCOUNTER — APPOINTMENT (OUTPATIENT)
Dept: HEMATOLOGY ONCOLOGY | Facility: CLINIC | Age: 57
End: 2022-11-30

## 2022-11-30 ENCOUNTER — APPOINTMENT (OUTPATIENT)
Dept: INFUSION THERAPY | Facility: HOSPITAL | Age: 57
End: 2022-11-30

## 2022-11-30 VITALS
DIASTOLIC BLOOD PRESSURE: 85 MMHG | TEMPERATURE: 97 F | BODY MASS INDEX: 36.6 KG/M2 | SYSTOLIC BLOOD PRESSURE: 121 MMHG | OXYGEN SATURATION: 95 % | RESPIRATION RATE: 17 BRPM | WEIGHT: 233.69 LBS | HEART RATE: 108 BPM

## 2022-11-30 DIAGNOSIS — C79.51 SECONDARY MALIGNANT NEOPLASM OF BONE: ICD-10-CM

## 2022-11-30 LAB
BASOPHILS # BLD AUTO: 0.09 K/UL — SIGNIFICANT CHANGE UP (ref 0–0.2)
BASOPHILS NFR BLD AUTO: 4 % — HIGH (ref 0–2)
EOSINOPHIL # BLD AUTO: 0.07 K/UL — SIGNIFICANT CHANGE UP (ref 0–0.5)
EOSINOPHIL NFR BLD AUTO: 3 % — SIGNIFICANT CHANGE UP (ref 0–6)
HCT VFR BLD CALC: 37.8 % — SIGNIFICANT CHANGE UP (ref 34.5–45)
HGB BLD-MCNC: 13 G/DL — SIGNIFICANT CHANGE UP (ref 11.5–15.5)
LYMPHOCYTES # BLD AUTO: 0.92 K/UL — LOW (ref 1–3.3)
LYMPHOCYTES # BLD AUTO: 39 % — SIGNIFICANT CHANGE UP (ref 13–44)
MCHC RBC-ENTMCNC: 34.4 G/DL — SIGNIFICANT CHANGE UP (ref 32–36)
MCHC RBC-ENTMCNC: 34.8 PG — HIGH (ref 27–34)
MCV RBC AUTO: 101.1 FL — HIGH (ref 80–100)
MONOCYTES # BLD AUTO: 0.19 K/UL — SIGNIFICANT CHANGE UP (ref 0–0.9)
MONOCYTES NFR BLD AUTO: 8 % — SIGNIFICANT CHANGE UP (ref 2–14)
NEUTROPHILS # BLD AUTO: 1.08 K/UL — LOW (ref 1.8–7.4)
NEUTROPHILS NFR BLD AUTO: 46 % — SIGNIFICANT CHANGE UP (ref 43–77)
NRBC # BLD: 0 /100 — SIGNIFICANT CHANGE UP (ref 0–0)
NRBC # BLD: SIGNIFICANT CHANGE UP /100 WBCS (ref 0–0)
PLAT MORPH BLD: NORMAL — SIGNIFICANT CHANGE UP
PLATELET # BLD AUTO: 168 K/UL — SIGNIFICANT CHANGE UP (ref 150–400)
RBC # BLD: 3.74 M/UL — LOW (ref 3.8–5.2)
RBC # FLD: 12.1 % — SIGNIFICANT CHANGE UP (ref 10.3–14.5)
RBC BLD AUTO: SIGNIFICANT CHANGE UP
WBC # BLD: 2.35 K/UL — LOW (ref 3.8–10.5)
WBC # FLD AUTO: 2.35 K/UL — LOW (ref 3.8–10.5)

## 2022-11-30 PROCEDURE — 99214 OFFICE O/P EST MOD 30 MIN: CPT

## 2022-12-01 LAB
ALBUMIN SERPL ELPH-MCNC: 4.6 G/DL
ALP BLD-CCNC: 60 U/L
ALT SERPL-CCNC: 18 U/L
ANION GAP SERPL CALC-SCNC: 12 MMOL/L
AST SERPL-CCNC: 20 U/L
BILIRUB SERPL-MCNC: 0.3 MG/DL
BUN SERPL-MCNC: 21 MG/DL
CALCIUM SERPL-MCNC: 10.3 MG/DL
CANCER AG27-29 SERPL-ACNC: 19.3 U/ML
CEA SERPL-MCNC: 3.3 NG/ML
CHLORIDE SERPL-SCNC: 104 MMOL/L
CO2 SERPL-SCNC: 26 MMOL/L
CREAT SERPL-MCNC: 0.94 MG/DL
EGFR: 71 ML/MIN/1.73M2
GLUCOSE SERPL-MCNC: 87 MG/DL
POTASSIUM SERPL-SCNC: 4.5 MMOL/L
PROT SERPL-MCNC: 6.8 G/DL
SODIUM SERPL-SCNC: 142 MMOL/L

## 2022-12-01 NOTE — PHYSICAL EXAM
[Fully active, able to carry on all pre-disease performance without restriction] : Status 0 - Fully active, able to carry on all pre-disease performance without restriction [Normal] : affect appropriate [de-identified] : Right breast with increasingly normal tissue and less firmness from cancer, central mass still present but much smaller

## 2022-12-01 NOTE — HISTORY OF PRESENT ILLNESS
[Disease: _____________________] : Disease: [unfilled] [T: ___] : T[unfilled] [N: ___] : N[unfilled] [M: ___] : M[unfilled] [AJCC Stage: ____] : AJCC Stage: [unfilled] [de-identified] : Right breast cancer diagnosed at the age of 52\par 11/14 Presented with right breast mastitis and treated with antibiotics\par Multiple recurrence of mastitis over next two years, 3-4 per year with increased frequency in 2016-17\par 6/13/16 She had a 3 mm punch biopsy of the right breast which revealed interstitial neutrophilic infiltrate\par 04/25/17 Bilateral breast mammogram showed no significant interval change in the appearance of the diffuse breast edema and diffuse skin thickening in the right breast with no specific evidence of adenopathy or underlying abnormality. New skin thickening and increase parenchymal density in the left LIQ. Finding are indeterminate and biopsy was suggested. Bilateral breast ultrasound showed diffuse right sided skin thickening with focal left medial new skin thickening.\par 06/02/17 Diagnostic mammogram revealed signs of breast edema with skin thickening, progressive since prior studies. D/DX cellulitis vs. mastitis vs. inflammatory breast cancer.\par 06/03/17 Bilateral breast ultrasound again showed nonspecific and recurrent signs of inflammation in the right breast, consisting of skin thickening and breast edema. Patient reported having pain, erythema, fever and shaking chills, slightly better with antibiotics.\par 06/22/17 Right central breast, sonoguided core biopsy showed  invasive ductal carcinoma, SBR 6/9, ER 95%, IN 30% and HER 2 negative and DCIS solid pattern with intermediate grade nuclear atypia.\par 06/26/17 MRI of the breast showed a recently diagnosed right breast malignancy, with diffuse skin thickening and asymmetric parenchymal enhancement. A 5 mm enhancing nodule  in the right upper outer breast, possibly representing intramammary lymph node. No suspicious findings in the left breast.\par 06/27/17 CT scan of C/A/P showed an irregular right sided breast thickening compatible with biopsy proven right breast cancer. Osseous metastases involving the T10, T11 and L4 vertebral bodies. Indeterminate 2 cm left adrenal nodule.\par 06/27/17 Bone scan showed abnormal bone scan compatible with multifocal osseous metastases in the thoracolumbar spine, left iliac bone and in the right acetabulum.\par 7/14/17 BSO \par 7/21/17 Started letrozole and Ibrance \par 7/2017 Xgeva started every 3 months and then decreased to every 6 months due to good cancer control [de-identified] : Invasive ductal carcinoma, ER 95%, DC 30% and HER 2 negative [de-identified] : Nasrin started letrozole and Ibrance in 7/17 and continues to do well overall with some neutropenia and fatigue and arthralgias.\par The arthralgias were her greatest issue, but are much better since starting Celebrex 100 mg bid.\par She has not any mouth sores in the past few months and has not needed to use the dexamethasone elixir.\par She still gets some hot flashes, but they are mild and very manageable. \par She had an episode of right breast mastitis 7/2020 and then called 12/7/21 to report left mastitis, which she had not previously had. She took Keflex for 7 days with resolution of her symptoms in 3 days. No further episodes. \par Her mother passed away the week before Thanksgiving in hospice from dementia.\par Her older daughters are both in graduate school in [place] in speech pathology and hospital administration/. Her younger daughter graduated in 5/22 and will be going to [employer]  for a one year Masters in business management. They will all graduate in 5/2023.\par \par 11/9/22 CT scan showed stable sclerotic bony metastases and no evidence of new or increasing metastatic disease.\par 7/14/22 CT scan showed stable sclerotic bony metastases and no new metastatic disease\par 7/5/22 Bone scan showed stable disease and no new lesions\par 3/14/22 CT scan C/A/P showed unchanged extent of osteoblastic osseous metastases and no new metastatic disease.\par 1/31/22 Breast MRI showed diffuse mild right breast skin thickening and parenchymal edema with no foal enhancing mass. No suspicious enhancement in the left breast. No significant axillary or internal mammary lymphadenopathy.\par 11/2/21 CT scan C/A/P showed stable sclerotic osseous metastatic lesions without significant change. No evidence of any new cancer lesions on scan.\par 07/09/21 Bone scan shows no new osseous lesions and no significant change in existing lesions. 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 No other evidence for metastatic disease. Stable skin thickening of the right breast.\par 10/29/20 CT scan C/A/P showed multiple blastic bone metastases demonstrating no change since the prior exam. No other evidence of metastatic disease.\par 07/10/20 Bone scan showed osseous metastatic disease not significantly changed. Degenerative disease in the major joints. No new osseous lesions.\par 07/10/20 CT scan showed Stable exam. Sclerotic osseous metastases unchanged\par 3/11/20 CT scan showed Osseous metastatic disease, unchanged. No new foci of metastatic disease.\par 7/16/19 CT Scan showed stable examination. Sclerotic osseous metastatic disease without significant change.\par 7/16/19 Bone scan showed no significant interval change compared to the previous bone scan of 7/11/18, showing resolved or less prominent metastatic lesions from 6/27/17. No new osseous lesions are identified. 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 Stable radiographic distribution of sclerotic metastases. \par CT scan from 11/2/18: Stable with no new findings\par CT scan from 7/11/18: Stable with no new findings\par Bone scan 7/11/18 stable to improved with no new areas of disease\par CT scan from 4/9/18: Stable osseous metastases and no new findings.\par CT scan from 1/16/18: Stable osseous metastases. No radiographic evidence of visceral metastasis with a stable 2 cm indeterminate left adrenal nodule\par CT scan from 11/11/2019 Stable osseous metastases, no new areas of disease\par \par \par \par \par \par

## 2022-12-21 ENCOUNTER — OFFICE (OUTPATIENT)
Dept: URBAN - METROPOLITAN AREA CLINIC 12 | Facility: CLINIC | Age: 57
Setting detail: OPHTHALMOLOGY
End: 2022-12-21
Payer: MEDICARE

## 2022-12-21 DIAGNOSIS — H25.13: ICD-10-CM

## 2022-12-21 DIAGNOSIS — H11.32: ICD-10-CM

## 2022-12-21 PROCEDURE — 92004 COMPRE OPH EXAM NEW PT 1/>: CPT | Performed by: STUDENT IN AN ORGANIZED HEALTH CARE EDUCATION/TRAINING PROGRAM

## 2022-12-21 ASSESSMENT — REFRACTION_AUTOREFRACTION
OS_AXIS: 165
OS_SPHERE: -1.25
OD_CYLINDER: -1.00
OD_SPHERE: -0.75
OD_AXIS: 005
OS_CYLINDER: -1.00

## 2022-12-21 ASSESSMENT — REFRACTION_CURRENTRX
OS_VPRISM_DIRECTION: SV
OS_OVR_VA: 20/
OD_SPHERE: -0.75
OS_SPHERE: -1.25
OD_CYLINDER: -1.00
OD_OVR_VA: 20/
OD_VPRISM_DIRECTION: SV
OS_CYLINDER: -1.00
OS_AXIS: 165
OD_AXIS: 165

## 2022-12-21 ASSESSMENT — KERATOMETRY
OS_K2POWER_DIOPTERS: 44.00
OS_AXISANGLE_DEGREES: 077
OD_K1POWER_DIOPTERS: 43.00
OD_AXISANGLE_DEGREES: 087
OD_K2POWER_DIOPTERS: 44.00
OS_K1POWER_DIOPTERS: 43.00

## 2022-12-21 ASSESSMENT — AXIALLENGTH_DERIVED
OD_AL: 24.0892
OS_AL: 24.294

## 2022-12-21 ASSESSMENT — CONFRONTATIONAL VISUAL FIELD TEST (CVF)
OD_FINDINGS: FULL
OS_FINDINGS: FULL

## 2022-12-21 ASSESSMENT — SPHEQUIV_DERIVED
OS_SPHEQUIV: -1.75
OD_SPHEQUIV: -1.25

## 2022-12-21 ASSESSMENT — VISUAL ACUITY
OS_BCVA: 20/20
OD_BCVA: 20/20

## 2022-12-21 ASSESSMENT — TONOMETRY
OD_IOP_MMHG: 18
OS_IOP_MMHG: 19

## 2023-01-08 ENCOUNTER — OUTPATIENT (OUTPATIENT)
Dept: OUTPATIENT SERVICES | Facility: HOSPITAL | Age: 58
LOS: 1 days | Discharge: HOME | End: 2023-01-08

## 2023-01-08 DIAGNOSIS — C50.911 MALIGNANT NEOPLASM OF UNSPECIFIED SITE OF RIGHT FEMALE BREAST: ICD-10-CM

## 2023-01-08 DIAGNOSIS — Z98.891 HISTORY OF UTERINE SCAR FROM PREVIOUS SURGERY: Chronic | ICD-10-CM

## 2023-01-08 DIAGNOSIS — Z90.49 ACQUIRED ABSENCE OF OTHER SPECIFIED PARTS OF DIGESTIVE TRACT: Chronic | ICD-10-CM

## 2023-01-08 DIAGNOSIS — C79.51 SECONDARY MALIGNANT NEOPLASM OF BONE: ICD-10-CM

## 2023-01-11 ENCOUNTER — NON-APPOINTMENT (OUTPATIENT)
Age: 58
End: 2023-01-11

## 2023-01-11 ENCOUNTER — APPOINTMENT (OUTPATIENT)
Dept: HEMATOLOGY ONCOLOGY | Facility: CLINIC | Age: 58
End: 2023-01-11
Payer: MEDICARE

## 2023-01-11 ENCOUNTER — APPOINTMENT (OUTPATIENT)
Dept: HEMATOLOGY ONCOLOGY | Facility: CLINIC | Age: 58
End: 2023-01-11

## 2023-01-11 ENCOUNTER — RESULT REVIEW (OUTPATIENT)
Age: 58
End: 2023-01-11

## 2023-01-11 VITALS
WEIGHT: 235.89 LBS | SYSTOLIC BLOOD PRESSURE: 134 MMHG | TEMPERATURE: 96.6 F | RESPIRATION RATE: 18 BRPM | OXYGEN SATURATION: 97 % | DIASTOLIC BLOOD PRESSURE: 87 MMHG | HEART RATE: 96 BPM | BODY MASS INDEX: 36.95 KG/M2

## 2023-01-11 LAB
BASOPHILS # BLD AUTO: 0.05 K/UL — SIGNIFICANT CHANGE UP (ref 0–0.2)
BASOPHILS NFR BLD AUTO: 2 % — SIGNIFICANT CHANGE UP (ref 0–2)
EOSINOPHIL # BLD AUTO: 0.02 K/UL — SIGNIFICANT CHANGE UP (ref 0–0.5)
EOSINOPHIL NFR BLD AUTO: 1 % — SIGNIFICANT CHANGE UP (ref 0–6)
HCT VFR BLD CALC: 38.1 % — SIGNIFICANT CHANGE UP (ref 34.5–45)
HGB BLD-MCNC: 13.1 G/DL — SIGNIFICANT CHANGE UP (ref 11.5–15.5)
LYMPHOCYTES # BLD AUTO: 0.67 K/UL — LOW (ref 1–3.3)
LYMPHOCYTES # BLD AUTO: 27 % — SIGNIFICANT CHANGE UP (ref 13–44)
MCHC RBC-ENTMCNC: 34.4 G/DL — SIGNIFICANT CHANGE UP (ref 32–36)
MCHC RBC-ENTMCNC: 34.7 PG — HIGH (ref 27–34)
MCV RBC AUTO: 101.1 FL — HIGH (ref 80–100)
MONOCYTES # BLD AUTO: 0.12 K/UL — SIGNIFICANT CHANGE UP (ref 0–0.9)
MONOCYTES NFR BLD AUTO: 5 % — SIGNIFICANT CHANGE UP (ref 2–14)
NEUTROPHILS # BLD AUTO: 1.61 K/UL — LOW (ref 1.8–7.4)
NEUTROPHILS NFR BLD AUTO: 65 % — SIGNIFICANT CHANGE UP (ref 43–77)
NRBC # BLD: 0 /100 — SIGNIFICANT CHANGE UP (ref 0–0)
NRBC # BLD: SIGNIFICANT CHANGE UP /100 WBCS (ref 0–0)
PLAT MORPH BLD: NORMAL — SIGNIFICANT CHANGE UP
PLATELET # BLD AUTO: 269 K/UL — SIGNIFICANT CHANGE UP (ref 150–400)
RBC # BLD: 3.77 M/UL — LOW (ref 3.8–5.2)
RBC # FLD: 12.2 % — SIGNIFICANT CHANGE UP (ref 10.3–14.5)
RBC BLD AUTO: SIGNIFICANT CHANGE UP
WBC # BLD: 2.48 K/UL — LOW (ref 3.8–10.5)
WBC # FLD AUTO: 2.48 K/UL — LOW (ref 3.8–10.5)

## 2023-01-11 PROCEDURE — 99214 OFFICE O/P EST MOD 30 MIN: CPT

## 2023-01-11 NOTE — HISTORY OF PRESENT ILLNESS
[Disease: _____________________] : Disease: [unfilled] [T: ___] : T[unfilled] [N: ___] : N[unfilled] [M: ___] : M[unfilled] [AJCC Stage: ____] : AJCC Stage: [unfilled] [de-identified] : Right breast cancer diagnosed at the age of 52\par 11/14 Presented with right breast mastitis and treated with antibiotics\par Multiple recurrence of mastitis over next two years, 3-4 per year with increased frequency in 2016-17\par 6/13/16 She had a 3 mm punch biopsy of the right breast which revealed interstitial neutrophilic infiltrate\par 04/25/17 Bilateral breast mammogram showed no significant interval change in the appearance of the diffuse breast edema and diffuse skin thickening in the right breast with no specific evidence of adenopathy or underlying abnormality. New skin thickening and increase parenchymal density in the left LIQ. Finding are indeterminate and biopsy was suggested. Bilateral breast ultrasound showed diffuse right sided skin thickening with focal left medial new skin thickening.\par 06/02/17 Diagnostic mammogram revealed signs of breast edema with skin thickening, progressive since prior studies. D/DX cellulitis vs. mastitis vs. inflammatory breast cancer.\par 06/03/17 Bilateral breast ultrasound again showed nonspecific and recurrent signs of inflammation in the right breast, consisting of skin thickening and breast edema. Patient reported having pain, erythema, fever and shaking chills, slightly better with antibiotics.\par 06/22/17 Right central breast, sonoguided core biopsy showed  invasive ductal carcinoma, SBR 6/9, ER 95%, NM 30% and HER 2 negative and DCIS solid pattern with intermediate grade nuclear atypia.\par 06/26/17 MRI of the breast showed a recently diagnosed right breast malignancy, with diffuse skin thickening and asymmetric parenchymal enhancement. A 5 mm enhancing nodule  in the right upper outer breast, possibly representing intramammary lymph node. No suspicious findings in the left breast.\par 06/27/17 CT scan of C/A/P showed an irregular right sided breast thickening compatible with biopsy proven right breast cancer. Osseous metastases involving the T10, T11 and L4 vertebral bodies. Indeterminate 2 cm left adrenal nodule.\par 06/27/17 Bone scan showed abnormal bone scan compatible with multifocal osseous metastases in the thoracolumbar spine, left iliac bone and in the right acetabulum.\par 7/14/17 BSO \par 7/21/17 Started letrozole and Ibrance \par 7/2017 Xgeva started every 3 months and then decreased to every 6 months due to good cancer control [de-identified] : Invasive ductal carcinoma, ER 95%, [place] 30% and HER 2 negative [de-identified] : Nasrin started letrozole and Ibrance in 7/17 and continues to do well overall with some neutropenia and fatigue and arthralgias.\par The arthralgias were her greatest issue, but are much better since starting Celebrex 100 mg bid.\par She has not any mouth sores in the past few months and has not needed to use the dexamethasone elixir for quite a while\par She still gets some hot flashes, but they are mild and very manageable. \par She had an episode of right breast mastitis 7/2020 and then called 12/7/21 to report left mastitis, which she had not previously had. She took Keflex for 7 days with resolution of her symptoms in 3 days. No further episodes. \par Her mother passed away the week before Thanksgiving in hospice from dementia and she is spending a lot of time now with her father who lives nearby.\par Her older daughters (twins) are both in graduate school in [place] in speech pathology and hospital administration/. Her younger daughter graduated in 5/22 and is now at [employer] for a one year Masters in business management. They will all graduate in 5/2023.\par \par 11/9/22 CT scan showed stable sclerotic bony metastases and no evidence of new or increasing metastatic disease.\par 7/14/22 CT scan showed stable sclerotic bony metastases and no new metastatic disease\par 7/5/22 Bone scan showed stable disease and no new lesions\par 3/14/22 CT scan C/A/P showed unchanged extent of osteoblastic osseous metastases and no new metastatic disease.\par 1/31/22 Breast MRI showed diffuse mild right breast skin thickening and parenchymal edema with no foal enhancing mass. No suspicious enhancement in the left breast. No significant axillary or internal mammary lymphadenopathy.\par 11/2/21 CT scan C/A/P showed stable sclerotic osseous metastatic lesions without significant change. No evidence of any new cancer lesions on scan.\par 07/09/21 Bone scan shows no new osseous lesions and no significant change in existing lesions. 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 No other evidence for metastatic disease. Stable skin thickening of the right breast.\par 10/29/20 CT scan C/A/P showed multiple blastic bone metastases demonstrating no change since the prior exam. No other evidence of metastatic disease.\par 07/10/20 Bone scan showed osseous metastatic disease not significantly changed. Degenerative disease in the major joints. No new osseous lesions.\par 07/10/20 CT scan showed Stable exam. Sclerotic osseous metastases unchanged\par 3/11/20 CT scan showed Osseous metastatic disease, unchanged. No new foci of metastatic disease.\par 7/16/19 CT Scan showed stable examination. Sclerotic osseous metastatic disease without significant change.\par 7/16/19 Bone scan showed no significant interval change compared to the previous bone scan of 7/11/18, showing resolved or less prominent metastatic lesions from 6/27/17. No new osseous lesions are identified. 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 Stable radiographic distribution of sclerotic metastases. \par CT scan from 11/2/18: Stable with no new findings\par CT scan from 7/11/18: Stable with no new findings\par Bone scan 7/11/18 stable to improved with no new areas of disease\par CT scan from 4/9/18: Stable osseous metastases and no new findings.\par CT scan from 1/16/18: Stable osseous metastases. No radiographic evidence of visceral metastasis with a stable 2 cm indeterminate left adrenal nodule\par CT scan from 11/11/2019 Stable osseous metastases, no new areas of disease\par \par \par \par \par \par

## 2023-01-11 NOTE — PHYSICAL EXAM
[Fully active, able to carry on all pre-disease performance without restriction] : Status 0 - Fully active, able to carry on all pre-disease performance without restriction [Normal] : affect appropriate [de-identified] : Right breast with increasingly normal tissue and less firmness from cancer, central mass still present but much smaller

## 2023-01-12 LAB
ALBUMIN SERPL ELPH-MCNC: 4.4 G/DL
ALP BLD-CCNC: 61 U/L
ALT SERPL-CCNC: 22 U/L
ANION GAP SERPL CALC-SCNC: 11 MMOL/L
AST SERPL-CCNC: 17 U/L
BILIRUB SERPL-MCNC: 0.5 MG/DL
BUN SERPL-MCNC: 18 MG/DL
CALCIUM SERPL-MCNC: 9.9 MG/DL
CANCER AG27-29 SERPL-ACNC: 20.6 U/ML
CEA SERPL-MCNC: 3.3 NG/ML
CHLORIDE SERPL-SCNC: 104 MMOL/L
CO2 SERPL-SCNC: 24 MMOL/L
CREAT SERPL-MCNC: 0.96 MG/DL
EGFR: 69 ML/MIN/1.73M2
GLUCOSE SERPL-MCNC: 83 MG/DL
POTASSIUM SERPL-SCNC: 4.1 MMOL/L
PROT SERPL-MCNC: 6.7 G/DL
SODIUM SERPL-SCNC: 140 MMOL/L

## 2023-03-09 ENCOUNTER — APPOINTMENT (OUTPATIENT)
Dept: CT IMAGING | Facility: CLINIC | Age: 58
End: 2023-03-09
Payer: MEDICARE

## 2023-03-09 ENCOUNTER — RESULT REVIEW (OUTPATIENT)
Age: 58
End: 2023-03-09

## 2023-03-09 ENCOUNTER — OUTPATIENT (OUTPATIENT)
Dept: OUTPATIENT SERVICES | Facility: HOSPITAL | Age: 58
LOS: 1 days | End: 2023-03-09
Payer: MEDICARE

## 2023-03-09 DIAGNOSIS — C50.911 MALIGNANT NEOPLASM OF UNSPECIFIED SITE OF RIGHT FEMALE BREAST: ICD-10-CM

## 2023-03-09 DIAGNOSIS — Z98.891 HISTORY OF UTERINE SCAR FROM PREVIOUS SURGERY: Chronic | ICD-10-CM

## 2023-03-09 DIAGNOSIS — Z90.49 ACQUIRED ABSENCE OF OTHER SPECIFIED PARTS OF DIGESTIVE TRACT: Chronic | ICD-10-CM

## 2023-03-09 PROCEDURE — 74177 CT ABD & PELVIS W/CONTRAST: CPT | Mod: 26,MG

## 2023-03-09 PROCEDURE — 71260 CT THORAX DX C+: CPT | Mod: 26,MH

## 2023-03-09 PROCEDURE — 74177 CT ABD & PELVIS W/CONTRAST: CPT | Mod: MG

## 2023-03-09 PROCEDURE — G1004: CPT

## 2023-03-09 PROCEDURE — 71260 CT THORAX DX C+: CPT | Mod: MH

## 2023-03-15 ENCOUNTER — OUTPATIENT (OUTPATIENT)
Dept: OUTPATIENT SERVICES | Facility: HOSPITAL | Age: 58
LOS: 1 days | Discharge: HOME | End: 2023-03-15

## 2023-03-15 DIAGNOSIS — C50.911 MALIGNANT NEOPLASM OF UNSPECIFIED SITE OF RIGHT FEMALE BREAST: ICD-10-CM

## 2023-03-15 DIAGNOSIS — Z90.49 ACQUIRED ABSENCE OF OTHER SPECIFIED PARTS OF DIGESTIVE TRACT: Chronic | ICD-10-CM

## 2023-03-15 DIAGNOSIS — Z98.891 HISTORY OF UTERINE SCAR FROM PREVIOUS SURGERY: Chronic | ICD-10-CM

## 2023-03-16 ENCOUNTER — APPOINTMENT (OUTPATIENT)
Dept: HEMATOLOGY ONCOLOGY | Facility: CLINIC | Age: 58
End: 2023-03-16
Payer: MEDICARE

## 2023-03-16 ENCOUNTER — RESULT REVIEW (OUTPATIENT)
Age: 58
End: 2023-03-16

## 2023-03-16 VITALS
BODY MASS INDEX: 37.53 KG/M2 | WEIGHT: 239.64 LBS | TEMPERATURE: 97.2 F | DIASTOLIC BLOOD PRESSURE: 88 MMHG | OXYGEN SATURATION: 97 % | SYSTOLIC BLOOD PRESSURE: 125 MMHG | HEART RATE: 107 BPM | RESPIRATION RATE: 16 BRPM

## 2023-03-16 LAB
BASOPHILS # BLD AUTO: 0.04 K/UL — SIGNIFICANT CHANGE UP (ref 0–0.2)
BASOPHILS NFR BLD AUTO: 2 % — SIGNIFICANT CHANGE UP (ref 0–2)
EOSINOPHIL # BLD AUTO: 0.04 K/UL — SIGNIFICANT CHANGE UP (ref 0–0.5)
EOSINOPHIL NFR BLD AUTO: 2 % — SIGNIFICANT CHANGE UP (ref 0–6)
HCT VFR BLD CALC: 38.2 % — SIGNIFICANT CHANGE UP (ref 34.5–45)
HGB BLD-MCNC: 12.9 G/DL — SIGNIFICANT CHANGE UP (ref 11.5–15.5)
IMM GRANULOCYTES NFR BLD AUTO: 0 % — SIGNIFICANT CHANGE UP (ref 0–0.9)
LYMPHOCYTES # BLD AUTO: 0.63 K/UL — LOW (ref 1–3.3)
LYMPHOCYTES # BLD AUTO: 32 % — SIGNIFICANT CHANGE UP (ref 13–44)
MCHC RBC-ENTMCNC: 33.8 G/DL — SIGNIFICANT CHANGE UP (ref 32–36)
MCHC RBC-ENTMCNC: 34.5 PG — HIGH (ref 27–34)
MCV RBC AUTO: 102.1 FL — HIGH (ref 80–100)
MONOCYTES # BLD AUTO: 0.13 K/UL — SIGNIFICANT CHANGE UP (ref 0–0.9)
MONOCYTES NFR BLD AUTO: 6.6 % — SIGNIFICANT CHANGE UP (ref 2–14)
NEUTROPHILS # BLD AUTO: 1.13 K/UL — LOW (ref 1.8–7.4)
NEUTROPHILS NFR BLD AUTO: 57.4 % — SIGNIFICANT CHANGE UP (ref 43–77)
NRBC # BLD: 0 /100 WBCS — SIGNIFICANT CHANGE UP (ref 0–0)
PLATELET # BLD AUTO: 262 K/UL — SIGNIFICANT CHANGE UP (ref 150–400)
RBC # BLD: 3.74 M/UL — LOW (ref 3.8–5.2)
RBC # FLD: 12.4 % — SIGNIFICANT CHANGE UP (ref 10.3–14.5)
WBC # BLD: 1.97 K/UL — LOW (ref 3.8–10.5)
WBC # FLD AUTO: 1.97 K/UL — LOW (ref 3.8–10.5)

## 2023-03-16 PROCEDURE — 99214 OFFICE O/P EST MOD 30 MIN: CPT

## 2023-03-16 NOTE — PHYSICAL EXAM
[Fully active, able to carry on all pre-disease performance without restriction] : Status 0 - Fully active, able to carry on all pre-disease performance without restriction [Normal] : grossly intact [de-identified] : Right breast with increasingly normal tissue and less firmness from cancer, central mass still present but much smaller

## 2023-03-16 NOTE — HISTORY OF PRESENT ILLNESS
[Disease: _____________________] : Disease: [unfilled] [T: ___] : T[unfilled] [N: ___] : N[unfilled] [M: ___] : M[unfilled] [AJCC Stage: ____] : AJCC Stage: [unfilled] [de-identified] : Right breast cancer diagnosed at the age of 52\par 11/14 Presented with right breast mastitis and treated with antibiotics\par Multiple recurrence of mastitis over next two years, 3-4 per year with increased frequency in 2016-17\par 6/13/16 She had a 3 mm punch biopsy of the right breast which revealed interstitial neutrophilic infiltrate\par 04/25/17 Bilateral breast mammogram showed no significant interval change in the appearance of the diffuse breast edema and diffuse skin thickening in the right breast with no specific evidence of adenopathy or underlying abnormality. New skin thickening and increase parenchymal density in the left LIQ. Finding are indeterminate and biopsy was suggested. Bilateral breast ultrasound showed diffuse right sided skin thickening with focal left medial new skin thickening.\par 06/02/17 Diagnostic mammogram revealed signs of breast edema with skin thickening, progressive since prior studies. D/DX cellulitis vs. mastitis vs. inflammatory breast cancer.\par 06/03/17 Bilateral breast ultrasound again showed nonspecific and recurrent signs of inflammation in the right breast, consisting of skin thickening and breast edema. Patient reported having pain, erythema, fever and shaking chills, slightly better with antibiotics.\par 06/22/17 Right central breast, sonoguided core biopsy showed  invasive ductal carcinoma, SBR 6/9, ER 95%, [place] 30% and HER 2 negative and DCIS solid pattern with intermediate grade nuclear atypia.\par 06/26/17 MRI of the breast showed the recently diagnosed right breast malignancy, with diffuse skin thickening and asymmetric parenchymal enhancement. A 5 mm enhancing nodule  in the right upper outer breast, possibly representing intramammary lymph node. No suspicious findings in the left breast.\par 06/27/17 CT scan of C/A/P showed an irregular right sided breast thickening compatible with biopsy proven right breast cancer. Osseous metastases involving the T10, T11 and L4 vertebral bodies. Indeterminate 2 cm left adrenal nodule.\par 06/27/17 Bone scan showed abnormal bone scan compatible with multifocal osseous metastases in the thoracolumbar spine, left iliac bone and in the right acetabulum.\par 7/14/17 BSO \par 7/21/17 Started letrozole 2.5 mg daily and Ibrance 125 mg for days 1-21 every 28 days\par 7/2017 Xgeva started every 3 months and then decreased to every 6 months in 2018 due to good cancer control and to lower risk of ONJ [de-identified] : Invasive ductal carcinoma, ER 95%, [place] 30% and HER 2 negative [de-identified] : Nasrin started letrozole and Ibrance in 7/17 and continues to do well overall with some neutropenia and fatigue and arthralgias.\par The arthralgias were her greatest issue, but are much better since starting Celebrex 100 mg bid.\par She has not any mouth sores in the past few months and has not needed to use the dexamethasone elixir for quite a while\par She still gets some hot flashes, but they are mild and very manageable. \par She had an episode of right breast mastitis 7/2020 and then called 12/7/21 to report left mastitis, which she had not previously had. She took Keflex for 7 days with resolution of her symptoms in 3 days. No further episodes. \par Her mother passed away the week before Thanksgiving, 2022 in hospice from dementia and she is spending a lot of time now with her father who lives nearby.\par Her older daughters (twins) are both in graduate school in [place] in speech pathology and hospital administration/. Her younger daughter graduated in 5/22 and is now at [employer] for a one year Masters in business management. They will all graduate in 5/2023.\par \par 03/09/23 CT scans showed extensive asymmetric right breast thickening again noted. A 2.3 cm left adrenal mass is without signal change dating back to 7/16/2019. Bilateral renal cysts, largest 11 cm in the right kidney. 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 No hydronephrosis. Multiple sclerotic metastases appear unchanged. Mild degenerative changes in the spine. No significant interval change since prior. No new findings.\par 11/9/22 CT scans showed stable sclerotic bony metastases and no evidence of new or increasing metastatic disease.\par 7/14/22 CT scans showed stable sclerotic bony metastases and no new metastatic disease\par 7/5/22 Bone scan showed stable disease and no new lesions\par 3/14/22 CT scan C/A/P showed unchanged extent of osteoblastic osseous metastases and no new metastatic disease.\par 1/31/22 Breast MRI showed diffuse mild right breast skin thickening and parenchymal edema with no foal enhancing mass. No suspicious enhancement in the left breast. No significant axillary or internal mammary lymphadenopathy.\par 11/2/21 CT scan C/A/P showed stable sclerotic osseous metastatic lesions without significant change. No evidence of any new cancer lesions on scan.\par 07/09/21 Bone scan shows no new osseous lesions and no significant change in existing lesions. 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 No other evidence for metastatic disease. Stable skin thickening of the right breast.\par 10/29/20 CT scan C/A/P showed multiple blastic bone metastases demonstrating no change since the prior exam. No other evidence of metastatic disease.\par 07/10/20 Bone scan showed osseous metastatic disease not significantly changed. Degenerative disease in the major joints. No new osseous lesions.\par 07/10/20 CT scan showed Stable exam. Sclerotic osseous metastases unchanged\par 3/11/20 CT scan showed Osseous metastatic disease, unchanged. No new foci of metastatic disease.\par 7/16/19 CT Scan showed stable examination. Sclerotic osseous metastatic disease without significant change.\par 7/16/19 Bone scan showed no significant interval change compared to the previous bone scan of 7/11/18, showing resolved or less prominent metastatic lesions from 6/27/17. No new osseous lesions are identified. 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 Stable radiographic distribution of sclerotic metastases. \par CT scan from 11/2/18: Stable with no new findings\par CT scan from 7/11/18: Stable with no new findings\par Bone scan 7/11/18 stable to improved with no new areas of disease\par CT scan from 4/9/18: Stable osseous metastases and no new findings.\par CT scan from 1/16/18: Stable osseous metastases. No radiographic evidence of visceral metastasis with a stable 2 cm indeterminate left adrenal nodule\par CT scan from 11/11/2019 Stable osseous metastases, no new areas of disease\par \par \par \par \par \par

## 2023-03-17 LAB
ALBUMIN SERPL ELPH-MCNC: 4.5 G/DL
ALP BLD-CCNC: 60 U/L
ALT SERPL-CCNC: 17 U/L
ANION GAP SERPL CALC-SCNC: 12 MMOL/L
AST SERPL-CCNC: 18 U/L
BILIRUB SERPL-MCNC: 0.4 MG/DL
BUN SERPL-MCNC: 20 MG/DL
CALCIUM SERPL-MCNC: 9.9 MG/DL
CANCER AG27-29 SERPL-ACNC: 20 U/ML
CEA SERPL-MCNC: 3.9 NG/ML
CHLORIDE SERPL-SCNC: 103 MMOL/L
CO2 SERPL-SCNC: 25 MMOL/L
CREAT SERPL-MCNC: 0.96 MG/DL
EGFR: 69 ML/MIN/1.73M2
GLUCOSE SERPL-MCNC: 91 MG/DL
POTASSIUM SERPL-SCNC: 4.5 MMOL/L
PROT SERPL-MCNC: 6.9 G/DL
SODIUM SERPL-SCNC: 140 MMOL/L

## 2023-05-11 ENCOUNTER — OUTPATIENT (OUTPATIENT)
Dept: OUTPATIENT SERVICES | Facility: HOSPITAL | Age: 58
LOS: 1 days | Discharge: HOME | End: 2023-05-11

## 2023-05-11 DIAGNOSIS — C50.911 MALIGNANT NEOPLASM OF UNSPECIFIED SITE OF RIGHT FEMALE BREAST: ICD-10-CM

## 2023-05-11 DIAGNOSIS — Z90.49 ACQUIRED ABSENCE OF OTHER SPECIFIED PARTS OF DIGESTIVE TRACT: Chronic | ICD-10-CM

## 2023-05-11 DIAGNOSIS — Z98.891 HISTORY OF UTERINE SCAR FROM PREVIOUS SURGERY: Chronic | ICD-10-CM

## 2023-05-25 ENCOUNTER — APPOINTMENT (OUTPATIENT)
Dept: HEMATOLOGY ONCOLOGY | Facility: CLINIC | Age: 58
End: 2023-05-25
Payer: MEDICARE

## 2023-05-25 ENCOUNTER — APPOINTMENT (OUTPATIENT)
Dept: INFUSION THERAPY | Facility: HOSPITAL | Age: 58
End: 2023-05-25

## 2023-05-25 ENCOUNTER — RESULT REVIEW (OUTPATIENT)
Age: 58
End: 2023-05-25

## 2023-05-25 VITALS
WEIGHT: 240.3 LBS | OXYGEN SATURATION: 98 % | HEART RATE: 116 BPM | BODY MASS INDEX: 37.64 KG/M2 | DIASTOLIC BLOOD PRESSURE: 91 MMHG | TEMPERATURE: 97 F | SYSTOLIC BLOOD PRESSURE: 141 MMHG | RESPIRATION RATE: 16 BRPM

## 2023-05-25 DIAGNOSIS — C79.51 SECONDARY MALIGNANT NEOPLASM OF BONE: ICD-10-CM

## 2023-05-25 LAB
BASOPHILS # BLD AUTO: 0.07 K/UL — SIGNIFICANT CHANGE UP (ref 0–0.2)
BASOPHILS NFR BLD AUTO: 2.5 % — HIGH (ref 0–2)
EOSINOPHIL # BLD AUTO: 0.04 K/UL — SIGNIFICANT CHANGE UP (ref 0–0.5)
EOSINOPHIL NFR BLD AUTO: 1.5 % — SIGNIFICANT CHANGE UP (ref 0–6)
HCT VFR BLD CALC: 40 % — SIGNIFICANT CHANGE UP (ref 34.5–45)
HGB BLD-MCNC: 13.7 G/DL — SIGNIFICANT CHANGE UP (ref 11.5–15.5)
IMM GRANULOCYTES NFR BLD AUTO: 0.4 % — SIGNIFICANT CHANGE UP (ref 0–0.9)
LYMPHOCYTES # BLD AUTO: 0.96 K/UL — LOW (ref 1–3.3)
LYMPHOCYTES # BLD AUTO: 34.9 % — SIGNIFICANT CHANGE UP (ref 13–44)
MCHC RBC-ENTMCNC: 34.3 G/DL — SIGNIFICANT CHANGE UP (ref 32–36)
MCHC RBC-ENTMCNC: 34.6 PG — HIGH (ref 27–34)
MCV RBC AUTO: 101 FL — HIGH (ref 80–100)
MONOCYTES # BLD AUTO: 0.36 K/UL — SIGNIFICANT CHANGE UP (ref 0–0.9)
MONOCYTES NFR BLD AUTO: 13.1 % — SIGNIFICANT CHANGE UP (ref 2–14)
NEUTROPHILS # BLD AUTO: 1.31 K/UL — LOW (ref 1.8–7.4)
NEUTROPHILS NFR BLD AUTO: 47.6 % — SIGNIFICANT CHANGE UP (ref 43–77)
NRBC # BLD: 0 /100 WBCS — SIGNIFICANT CHANGE UP (ref 0–0)
PLATELET # BLD AUTO: 143 K/UL — LOW (ref 150–400)
RBC # BLD: 3.96 M/UL — SIGNIFICANT CHANGE UP (ref 3.8–5.2)
RBC # FLD: 12.6 % — SIGNIFICANT CHANGE UP (ref 10.3–14.5)
WBC # BLD: 2.75 K/UL — LOW (ref 3.8–10.5)
WBC # FLD AUTO: 2.75 K/UL — LOW (ref 3.8–10.5)

## 2023-05-25 PROCEDURE — 99214 OFFICE O/P EST MOD 30 MIN: CPT

## 2023-05-26 LAB
ALBUMIN SERPL ELPH-MCNC: 4.6 G/DL
ALP BLD-CCNC: 63 U/L
ALT SERPL-CCNC: 38 U/L
ANION GAP SERPL CALC-SCNC: 12 MMOL/L
AST SERPL-CCNC: 28 U/L
BILIRUB SERPL-MCNC: 0.4 MG/DL
BUN SERPL-MCNC: 21 MG/DL
CALCIUM SERPL-MCNC: 10.4 MG/DL
CANCER AG27-29 SERPL-ACNC: 25.9 U/ML
CEA SERPL-MCNC: 5.4 NG/ML
CHLORIDE SERPL-SCNC: 106 MMOL/L
CO2 SERPL-SCNC: 24 MMOL/L
CREAT SERPL-MCNC: 1.03 MG/DL
EGFR: 64 ML/MIN/1.73M2
GLUCOSE SERPL-MCNC: 109 MG/DL
POTASSIUM SERPL-SCNC: 4.5 MMOL/L
PROT SERPL-MCNC: 7.3 G/DL
SODIUM SERPL-SCNC: 141 MMOL/L

## 2023-06-05 NOTE — HISTORY OF PRESENT ILLNESS
[Disease: _____________________] : Disease: [unfilled] [T: ___] : T[unfilled] [N: ___] : N[unfilled] [M: ___] : M[unfilled] [AJCC Stage: ____] : AJCC Stage: [unfilled] [de-identified] : Right breast cancer diagnosed at the age of 52\par 11/14 Presented with right breast mastitis and treated with antibiotics\par Multiple recurrence of mastitis over next two years, 3-4 per year with increased frequency in 2016-17\par 6/13/16 She had a 3 mm punch biopsy of the right breast which revealed interstitial neutrophilic infiltrate\par 04/25/17 Bilateral breast mammogram showed no significant interval change in the appearance of the diffuse breast edema and diffuse skin thickening in the right breast with no specific evidence of adenopathy or underlying abnormality. New skin thickening and increase parenchymal density in the left LIQ. Finding are indeterminate and biopsy was suggested. Bilateral breast ultrasound showed diffuse right sided skin thickening with focal left medial new skin thickening.\par 06/02/17 Diagnostic mammogram revealed signs of breast edema with skin thickening, progressive since prior studies. D/DX cellulitis vs. mastitis vs. inflammatory breast cancer.\par 06/03/17 Bilateral breast ultrasound again showed nonspecific and recurrent signs of inflammation in the right breast, consisting of skin thickening and breast edema. Patient reported having pain, erythema, fever and shaking chills, slightly better with antibiotics.\par 06/22/17 Right central breast, sonoguided core biopsy showed  invasive ductal carcinoma, SBR 6/9, ER 95%, [place] 30% and HER 2 negative and DCIS solid pattern with intermediate grade nuclear atypia.\par 06/26/17 MRI of the breast showed the recently diagnosed right breast malignancy, with diffuse skin thickening and asymmetric parenchymal enhancement. A 5 mm enhancing nodule  in the right upper outer breast, possibly representing intramammary lymph node. No suspicious findings in the left breast.\par 06/27/17 CT scan of C/A/P showed an irregular right sided breast thickening compatible with biopsy proven right breast cancer. Osseous metastases involving the T10, T11 and L4 vertebral bodies. Indeterminate 2 cm left adrenal nodule.\par 06/27/17 Bone scan showed abnormal bone scan compatible with multifocal osseous metastases in the thoracolumbar spine, left iliac bone and in the right acetabulum.\par 7/14/17 BSO \par 7/21/17 Started letrozole 2.5 mg daily and Ibrance 125 mg for days 1-21 every 28 days\par 7/2017 Xgeva started every 3 months and then decreased to every 6 months in 2018 due to good cancer control and to lower risk of ONJ [de-identified] : Invasive ductal carcinoma, ER 95%, [place] 30% and HER 2 negative [de-identified] : Nasrin started letrozole and Ibrance in 7/17 and continues to do well overall with some neutropenia and fatigue and arthralgias. She is day 4 current cycle\par The arthralgias were her greatest issue, but are much better since starting Celebrex 100 mg bid.\par She has not any mouth sores in the past few months and has not needed to use the dexamethasone elixir for quite a while\par She still gets some hot flashes, but they are mild and very manageable. \par She had an episode of right breast mastitis 7/2020 and then called 12/7/21 to report left mastitis, which she had not previously had. She took Keflex for 7 days with resolution of her symptoms in 3 days. No further episodes. \par This has been a difficult winter and spring as she lost her mother in 11/22 and recently lost a friend to metastatic breast cancer, which has left her feeling more anxious and sad.\par Her mother passed away the week before Thanksgiving, 2022 in hospice from dementia and she is spending a lot of time now with her father who lives nearby.\par Her older daughters (twins) are both in graduate school in [place] in speech pathology and hospital administration/. Her younger daughter graduated in 5/22 and is now at [employer] for a one year Masters in business management. \par \par 03/09/23 CT scans showed extensive asymmetric right breast thickening again noted. A 2.3 cm left adrenal mass is without signal change dating back to 7/16/2019. Bilateral renal cysts, largest 11 cm in the right kidney. 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 No hydronephrosis. Multiple sclerotic metastases appear unchanged. Mild degenerative changes in the spine. No significant interval change since prior. No new findings.\par 11/9/22 CT scans showed stable sclerotic bony metastases and no evidence of new or increasing metastatic disease.\par 7/14/22 CT scans showed stable sclerotic bony metastases and no new metastatic disease\par 7/5/22 Bone scan showed stable disease and no new lesions\par 3/14/22 CT scan C/A/P showed unchanged extent of osteoblastic osseous metastases and no new metastatic disease.\par 1/31/22 Breast MRI showed diffuse mild right breast skin thickening and parenchymal edema with no foal enhancing mass. No suspicious enhancement in the left breast. No significant axillary or internal mammary lymphadenopathy.\par 11/2/21 CT scan C/A/P showed stable sclerotic osseous metastatic lesions without significant change. No evidence of any new cancer lesions on scan.\par 07/09/21 Bone scan shows no new osseous lesions and no significant change in existing lesions. 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 No other evidence for metastatic disease. Stable skin thickening of the right breast.\par 10/29/20 CT scan C/A/P showed multiple blastic bone metastases demonstrating no change since the prior exam. No other evidence of metastatic disease.\par 07/10/20 Bone scan showed osseous metastatic disease not significantly changed. Degenerative disease in the major joints. No new osseous lesions.\par 07/10/20 CT scan showed Stable exam. Sclerotic osseous metastases unchanged\par 3/11/20 CT scan showed Osseous metastatic disease, unchanged. No new foci of metastatic disease.\par 7/16/19 CT Scan showed stable examination. Sclerotic osseous metastatic disease without significant change.\par 7/16/19 Bone scan showed no significant interval change compared to the previous bone scan of 7/11/18, showing resolved or less prominent metastatic lesions from 6/27/17. No new osseous lesions are identified. 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 Stable radiographic distribution of sclerotic metastases. \par CT scan from 11/2/18: Stable with no new findings\par CT scan from 7/11/18: Stable with no new findings\par Bone scan 7/11/18 stable to improved with no new areas of disease\par CT scan from 4/9/18: Stable osseous metastases and no new findings.\par CT scan from 1/16/18: Stable osseous metastases. No radiographic evidence of visceral metastasis with a stable 2 cm indeterminate left adrenal nodule\par CT scan from 11/11/2019 Stable osseous metastases, no new areas of disease\par \par \par \par \par \par

## 2023-06-05 NOTE — PHYSICAL EXAM
[Fully active, able to carry on all pre-disease performance without restriction] : Status 0 - Fully active, able to carry on all pre-disease performance without restriction [Normal] : affect appropriate [de-identified] : Right breast with increasingly normal tissue and less firmness from cancer, central mass still present but much smaller

## 2023-06-20 ENCOUNTER — RESULT REVIEW (OUTPATIENT)
Age: 58
End: 2023-06-20

## 2023-06-27 ENCOUNTER — TRANSCRIPTION ENCOUNTER (OUTPATIENT)
Age: 58
End: 2023-06-27

## 2023-07-05 ENCOUNTER — APPOINTMENT (OUTPATIENT)
Dept: HEMATOLOGY ONCOLOGY | Facility: CLINIC | Age: 58
End: 2023-07-05
Payer: MEDICARE

## 2023-07-05 PROCEDURE — 90791 PSYCH DIAGNOSTIC EVALUATION: CPT | Mod: 95

## 2023-07-06 ENCOUNTER — APPOINTMENT (OUTPATIENT)
Dept: HEMATOLOGY ONCOLOGY | Facility: CLINIC | Age: 58
End: 2023-07-06

## 2023-07-07 ENCOUNTER — OUTPATIENT (OUTPATIENT)
Dept: OUTPATIENT SERVICES | Facility: HOSPITAL | Age: 58
LOS: 1 days | Discharge: HOME | End: 2023-07-07

## 2023-07-07 DIAGNOSIS — Z98.891 HISTORY OF UTERINE SCAR FROM PREVIOUS SURGERY: Chronic | ICD-10-CM

## 2023-07-07 DIAGNOSIS — Z90.49 ACQUIRED ABSENCE OF OTHER SPECIFIED PARTS OF DIGESTIVE TRACT: Chronic | ICD-10-CM

## 2023-07-07 DIAGNOSIS — C79.51 SECONDARY MALIGNANT NEOPLASM OF BONE: ICD-10-CM

## 2023-07-10 ENCOUNTER — APPOINTMENT (OUTPATIENT)
Dept: NUCLEAR MEDICINE | Facility: IMAGING CENTER | Age: 58
End: 2023-07-10
Payer: MEDICARE

## 2023-07-10 ENCOUNTER — APPOINTMENT (OUTPATIENT)
Dept: CT IMAGING | Facility: IMAGING CENTER | Age: 58
End: 2023-07-10
Payer: MEDICARE

## 2023-07-10 ENCOUNTER — RESULT REVIEW (OUTPATIENT)
Age: 58
End: 2023-07-10

## 2023-07-10 ENCOUNTER — OUTPATIENT (OUTPATIENT)
Dept: OUTPATIENT SERVICES | Facility: HOSPITAL | Age: 58
LOS: 1 days | End: 2023-07-10
Payer: MEDICARE

## 2023-07-10 DIAGNOSIS — Z90.49 ACQUIRED ABSENCE OF OTHER SPECIFIED PARTS OF DIGESTIVE TRACT: Chronic | ICD-10-CM

## 2023-07-10 DIAGNOSIS — Z98.891 HISTORY OF UTERINE SCAR FROM PREVIOUS SURGERY: Chronic | ICD-10-CM

## 2023-07-10 DIAGNOSIS — C50.911 MALIGNANT NEOPLASM OF UNSPECIFIED SITE OF RIGHT FEMALE BREAST: ICD-10-CM

## 2023-07-10 PROCEDURE — 74177 CT ABD & PELVIS W/CONTRAST: CPT | Mod: 26,MH

## 2023-07-10 PROCEDURE — 78306 BONE IMAGING WHOLE BODY: CPT | Mod: 26,MH

## 2023-07-10 PROCEDURE — 74177 CT ABD & PELVIS W/CONTRAST: CPT

## 2023-07-10 PROCEDURE — 71260 CT THORAX DX C+: CPT | Mod: 26,MH

## 2023-07-10 PROCEDURE — 78830 RP LOCLZJ TUM SPECT W/CT 1: CPT | Mod: 26

## 2023-07-10 PROCEDURE — 78830 RP LOCLZJ TUM SPECT W/CT 1: CPT

## 2023-07-10 PROCEDURE — 78306 BONE IMAGING WHOLE BODY: CPT

## 2023-07-10 PROCEDURE — A9561: CPT

## 2023-07-10 PROCEDURE — 71260 CT THORAX DX C+: CPT

## 2023-07-14 ENCOUNTER — APPOINTMENT (OUTPATIENT)
Dept: HEMATOLOGY ONCOLOGY | Facility: CLINIC | Age: 58
End: 2023-07-14

## 2023-07-24 ENCOUNTER — APPOINTMENT (OUTPATIENT)
Dept: HEMATOLOGY ONCOLOGY | Facility: CLINIC | Age: 58
End: 2023-07-24

## 2023-08-02 ENCOUNTER — APPOINTMENT (OUTPATIENT)
Dept: HEMATOLOGY ONCOLOGY | Facility: CLINIC | Age: 58
End: 2023-08-02
Payer: MEDICARE

## 2023-08-02 ENCOUNTER — RESULT REVIEW (OUTPATIENT)
Age: 58
End: 2023-08-02

## 2023-08-02 ENCOUNTER — APPOINTMENT (OUTPATIENT)
Dept: HEMATOLOGY ONCOLOGY | Facility: CLINIC | Age: 58
End: 2023-08-02

## 2023-08-02 VITALS
WEIGHT: 238.76 LBS | DIASTOLIC BLOOD PRESSURE: 85 MMHG | BODY MASS INDEX: 37.39 KG/M2 | OXYGEN SATURATION: 93 % | TEMPERATURE: 97.2 F | RESPIRATION RATE: 16 BRPM | SYSTOLIC BLOOD PRESSURE: 125 MMHG | HEART RATE: 85 BPM

## 2023-08-02 LAB
ALBUMIN SERPL ELPH-MCNC: 4.6 G/DL
ALP BLD-CCNC: 55 U/L
ALT SERPL-CCNC: 19 U/L
ANION GAP SERPL CALC-SCNC: 13 MMOL/L
ANISOCYTOSIS BLD QL: SLIGHT — SIGNIFICANT CHANGE UP
AST SERPL-CCNC: 18 U/L
BASOPHILS # BLD AUTO: 0.02 K/UL — SIGNIFICANT CHANGE UP (ref 0–0.2)
BASOPHILS NFR BLD AUTO: 1 % — SIGNIFICANT CHANGE UP (ref 0–2)
BILIRUB SERPL-MCNC: 0.5 MG/DL
BUN SERPL-MCNC: 21 MG/DL
CALCIUM SERPL-MCNC: 9.7 MG/DL
CEA SERPL-MCNC: 5.4 NG/ML
CHLORIDE SERPL-SCNC: 105 MMOL/L
CO2 SERPL-SCNC: 24 MMOL/L
CREAT SERPL-MCNC: 0.95 MG/DL
EGFR: 70 ML/MIN/1.73M2
EOSINOPHIL # BLD AUTO: 0.03 K/UL — SIGNIFICANT CHANGE UP (ref 0–0.5)
EOSINOPHIL NFR BLD AUTO: 2 % — SIGNIFICANT CHANGE UP (ref 0–6)
GLUCOSE SERPL-MCNC: 93 MG/DL
HCT VFR BLD CALC: 39.2 % — SIGNIFICANT CHANGE UP (ref 34.5–45)
HGB BLD-MCNC: 13.4 G/DL — SIGNIFICANT CHANGE UP (ref 11.5–15.5)
LYMPHOCYTES # BLD AUTO: 0.59 K/UL — LOW (ref 1–3.3)
LYMPHOCYTES # BLD AUTO: 36 % — SIGNIFICANT CHANGE UP (ref 13–44)
MACROCYTES BLD QL: SLIGHT — SIGNIFICANT CHANGE UP
MCHC RBC-ENTMCNC: 34.2 G/DL — SIGNIFICANT CHANGE UP (ref 32–36)
MCHC RBC-ENTMCNC: 35 PG — HIGH (ref 27–34)
MCV RBC AUTO: 102.3 FL — HIGH (ref 80–100)
MONOCYTES # BLD AUTO: 0.18 K/UL — SIGNIFICANT CHANGE UP (ref 0–0.9)
MONOCYTES NFR BLD AUTO: 11 % — SIGNIFICANT CHANGE UP (ref 2–14)
NEUTROPHILS # BLD AUTO: 0.82 K/UL — LOW (ref 1.8–7.4)
NEUTROPHILS NFR BLD AUTO: 50 % — SIGNIFICANT CHANGE UP (ref 43–77)
NRBC # BLD: 0 /100 — SIGNIFICANT CHANGE UP (ref 0–0)
NRBC # BLD: SIGNIFICANT CHANGE UP /100 WBCS (ref 0–0)
PLAT MORPH BLD: NORMAL — SIGNIFICANT CHANGE UP
PLATELET # BLD AUTO: 276 K/UL — SIGNIFICANT CHANGE UP (ref 150–400)
POTASSIUM SERPL-SCNC: 4.2 MMOL/L
PROT SERPL-MCNC: 6.9 G/DL
RBC # BLD: 3.83 M/UL — SIGNIFICANT CHANGE UP (ref 3.8–5.2)
RBC # FLD: 12.4 % — SIGNIFICANT CHANGE UP (ref 10.3–14.5)
RBC BLD AUTO: ABNORMAL
SODIUM SERPL-SCNC: 142 MMOL/L
WBC # BLD: 1.64 K/UL — LOW (ref 3.8–10.5)
WBC # FLD AUTO: 1.64 K/UL — LOW (ref 3.8–10.5)

## 2023-08-02 PROCEDURE — 99214 OFFICE O/P EST MOD 30 MIN: CPT

## 2023-08-02 NOTE — PHYSICAL EXAM
[de-identified] : Right breast with increasingly normal tissue and less firmness from cancer, central mass still present but much smaller

## 2023-08-02 NOTE — HISTORY OF PRESENT ILLNESS
[de-identified] : Right breast cancer diagnosed at the age of 52 11/14 Presented with right breast mastitis and treated with antibiotics Multiple recurrence of mastitis over next two years, 3-4 per year with increased frequency in 2016-17 6/13/16 She had a 3 mm punch biopsy of the right breast which revealed interstitial neutrophilic infiltrate 04/25/17 Bilateral breast mammogram showed no significant interval change in the appearance of the diffuse breast edema and diffuse skin thickening in the right breast with no specific evidence of adenopathy or underlying abnormality. New skin thickening and increase parenchymal density in the left LIQ. Finding are indeterminate and biopsy was suggested. Bilateral breast ultrasound showed diffuse right sided skin thickening with focal left medial new skin thickening. 06/02/17 Diagnostic mammogram revealed signs of breast edema with skin thickening, progressive since prior studies. D/DX cellulitis vs. mastitis vs. inflammatory breast cancer. 06/03/17 Bilateral breast ultrasound again showed nonspecific and recurrent signs of inflammation in the right breast, consisting of skin thickening and breast edema. Patient reported having pain, erythema, fever and shaking chills, slightly better with antibiotics. 06/22/17 Right central breast, sonoguided core biopsy showed  invasive ductal carcinoma, SBR 6/9, ER 95%, [place] 30% and HER 2 negative and DCIS solid pattern with intermediate grade nuclear atypia. 06/26/17 MRI of the breast showed the recently diagnosed right breast malignancy, with diffuse skin thickening and asymmetric parenchymal enhancement. A 5 mm enhancing nodule  in the right upper outer breast, possibly representing intramammary lymph node. No suspicious findings in the left breast. 06/27/17 CT scan of C/A/P showed an irregular right sided breast thickening compatible with biopsy proven right breast cancer. Osseous metastases involving the T10, T11 and L4 vertebral bodies. Indeterminate 2 cm left adrenal nodule. 06/27/17 Bone scan showed abnormal bone scan compatible with multifocal osseous metastases in the thoracolumbar spine, left iliac bone and in the right acetabulum. 7/14/17 BSO  7/21/17 Started letrozole 2.5 mg daily and Ibrance 125 mg for days 1-21 every 28 days 7/2017 Xgeva started every 3 months and then decreased to every 6 months in 2018 due to good cancer control and to lower risk of ONJ [de-identified] : Invasive ductal carcinoma, ER 95%, [place] 30% and HER 2 negative [de-identified] : Nasrin started letrozole and Ibrance in  and continues to do well overall with some neutropenia and fatigue and arthralgias.  The arthralgias were her greatest issue but are much better since starting Celebrex 100 mg bid. She still gets some hot flashes, but they are mild and very manageable.  She had an episode of right breast mastitis 2020 and then called 21 to report left mastitis, which she had not previously had. She took Keflex for 7 days with resolution of her symptoms in 3 days. No further episodes.  This has been a difficult winter and spring as she lost her mother in  and recently lost a friend to metastatic breast cancer, which has left her feeling more anxious and sad. Her mother passed away the week before 2022 in hospice from dementia. Her father developed abdominal pain in  and then was found to have liver mets with ascites with cancer of unknown primary. He was hospitalized in July but deteriorated and was transferred to hospice and  a few days later. Her older daughters (twins) just graduated from [employer] in speech pathology and hospital administration/. Her younger daughter just completed her masters degree at [employer] in business management. She is trying out for the [place] rowing team (father is a citizen) and is continuing to train at [employer] for now.  07/10/23 CT scans showed Stable sclerotic bony metastases. No evidence of new or increasing metastatic disease. 07/10/23 Bone scan showed Metastatic disease not significantly changed compared to the previous bone scan of 2022. No new osseous lesions. 23 CT scans showed extensive asymmetric right breast thickening again noted. A 2.3 cm left adrenal mass is without signal change dating back to 2019. Bilateral renal cysts, largest 11 cm in the right kidney. 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 No hydronephrosis. Multiple sclerotic metastases appear unchanged. Mild degenerative changes in the spine. No significant interval change since prior. No new findings. 22 CT scans showed stable sclerotic bony metastases and no evidence of new or increasing metastatic disease. 22 CT scans showed stable sclerotic bony metastases and no new metastatic disease 22 Bone scan showed stable disease and no new lesions 3/14/22 CT scan C/A/P showed unchanged extent of osteoblastic osseous metastases and no new metastatic disease. 22 Breast MRI showed diffuse mild right breast skin thickening and parenchymal edema with no foal enhancing mass. No suspicious enhancement in the left breast. No significant axillary or internal mammary lymphadenopathy. 21 CT scan C/A/P showed stable sclerotic osseous metastatic lesions without significant change. No evidence of any new cancer lesions on scan. 21 Bone scan shows no new osseous lesions and no significant change in existing lesions. 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 21 CT scan C/A/P shows stable osseous lesions and no new findings 21 CT scan C/A/P showed grossly stable bone metastases. No other evidence for metastatic disease. Stable skin thickening of the right breast. 10/29/20 CT scan C/A/P showed multiple blastic bone metastases demonstrating no change since the prior exam. No other evidence of metastatic disease. 07/10/20 Bone scan showed osseous metastatic disease not significantly changed. Degenerative disease in the major joints. No new osseous lesions. 07/10/20 CT scan showed Stable exam. Sclerotic osseous metastases unchanged 3/11/20 CT scan showed Osseous metastatic disease, unchanged. No new foci of metastatic disease. 19 CT Scan showed stable examination. Sclerotic osseous metastatic disease without significant change. 19 Bone scan showed no significant interval change compared to the previous bone scan of 18, showing resolved or less prominent metastatic lesions from 17. No new osseous lesions are identified. 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  CT scan from 3/15/19 showed stable 2 cm left adrenal adenoma, small likely angiolipoma upper pole left kidney and right renal cyst unchanged. Stable radiographic distribution of sclerotic metastases.  CT scan from 18: Stable with no new findings CT scan from 18: Stable with no new findings Bone scan 18 stable to improved with no new areas of disease CT scan from 18: Stable osseous metastases and no new findings. CT scan from 18: Stable osseous metastases. No radiographic evidence of visceral metastasis with a stable 2 cm indeterminate left adrenal nodule CT scan from 2019 Stable osseous metastases, no new areas of disease

## 2023-08-03 LAB — CANCER AG27-29 SERPL-ACNC: 27.1 U/ML

## 2023-08-14 ENCOUNTER — APPOINTMENT (OUTPATIENT)
Dept: HEMATOLOGY ONCOLOGY | Facility: CLINIC | Age: 58
End: 2023-08-14
Payer: MEDICARE

## 2023-08-14 PROCEDURE — 90832 PSYTX W PT 30 MINUTES: CPT | Mod: 95

## 2023-09-13 ENCOUNTER — OUTPATIENT (OUTPATIENT)
Dept: OUTPATIENT SERVICES | Facility: HOSPITAL | Age: 58
LOS: 1 days | Discharge: HOME | End: 2023-09-13

## 2023-09-13 DIAGNOSIS — Z90.49 ACQUIRED ABSENCE OF OTHER SPECIFIED PARTS OF DIGESTIVE TRACT: Chronic | ICD-10-CM

## 2023-09-13 DIAGNOSIS — Z98.891 HISTORY OF UTERINE SCAR FROM PREVIOUS SURGERY: Chronic | ICD-10-CM

## 2023-09-13 DIAGNOSIS — C50.911 MALIGNANT NEOPLASM OF UNSPECIFIED SITE OF RIGHT FEMALE BREAST: ICD-10-CM

## 2023-09-19 ENCOUNTER — APPOINTMENT (OUTPATIENT)
Dept: HEMATOLOGY ONCOLOGY | Facility: CLINIC | Age: 58
End: 2023-09-19

## 2023-09-28 ENCOUNTER — APPOINTMENT (OUTPATIENT)
Dept: HEMATOLOGY ONCOLOGY | Facility: CLINIC | Age: 58
End: 2023-09-28

## 2023-09-28 ENCOUNTER — APPOINTMENT (OUTPATIENT)
Dept: HEMATOLOGY ONCOLOGY | Facility: CLINIC | Age: 58
End: 2023-09-28
Payer: MEDICARE

## 2023-09-28 ENCOUNTER — RESULT REVIEW (OUTPATIENT)
Age: 58
End: 2023-09-28

## 2023-09-28 VITALS
SYSTOLIC BLOOD PRESSURE: 121 MMHG | OXYGEN SATURATION: 94 % | RESPIRATION RATE: 16 BRPM | HEART RATE: 89 BPM | DIASTOLIC BLOOD PRESSURE: 85 MMHG | TEMPERATURE: 96.9 F

## 2023-09-28 LAB
ALBUMIN SERPL ELPH-MCNC: 4.7 G/DL
ALP BLD-CCNC: 57 U/L
ALT SERPL-CCNC: 18 U/L
ANION GAP SERPL CALC-SCNC: 10 MMOL/L
ANISOCYTOSIS BLD QL: SLIGHT — SIGNIFICANT CHANGE UP
AST SERPL-CCNC: 17 U/L
BASOPHILS # BLD AUTO: 0.02 K/UL — SIGNIFICANT CHANGE UP (ref 0–0.2)
BASOPHILS NFR BLD AUTO: 1 % — SIGNIFICANT CHANGE UP (ref 0–2)
BILIRUB SERPL-MCNC: 0.4 MG/DL
BUN SERPL-MCNC: 22 MG/DL
CALCIUM SERPL-MCNC: 9.6 MG/DL
CEA SERPL-MCNC: 5.9 NG/ML
CHLORIDE SERPL-SCNC: 105 MMOL/L
CO2 SERPL-SCNC: 25 MMOL/L
CREAT SERPL-MCNC: 1.03 MG/DL
EGFR: 63 ML/MIN/1.73M2
EOSINOPHIL # BLD AUTO: 0.04 K/UL — SIGNIFICANT CHANGE UP (ref 0–0.5)
EOSINOPHIL NFR BLD AUTO: 2 % — SIGNIFICANT CHANGE UP (ref 0–6)
GLUCOSE SERPL-MCNC: 97 MG/DL
HCT VFR BLD CALC: 38.6 % — SIGNIFICANT CHANGE UP (ref 34.5–45)
HGB BLD-MCNC: 13.3 G/DL — SIGNIFICANT CHANGE UP (ref 11.5–15.5)
LYMPHOCYTES # BLD AUTO: 0.73 K/UL — LOW (ref 1–3.3)
LYMPHOCYTES # BLD AUTO: 40 % — SIGNIFICANT CHANGE UP (ref 13–44)
MACROCYTES BLD QL: SLIGHT — SIGNIFICANT CHANGE UP
MCHC RBC-ENTMCNC: 34.5 G/DL — SIGNIFICANT CHANGE UP (ref 32–36)
MCHC RBC-ENTMCNC: 35 PG — HIGH (ref 27–34)
MCV RBC AUTO: 101.6 FL — HIGH (ref 80–100)
MONOCYTES # BLD AUTO: 0.18 K/UL — SIGNIFICANT CHANGE UP (ref 0–0.9)
MONOCYTES NFR BLD AUTO: 10 % — SIGNIFICANT CHANGE UP (ref 2–14)
NEUTROPHILS # BLD AUTO: 0.86 K/UL — LOW (ref 1.8–7.4)
NEUTROPHILS NFR BLD AUTO: 47 % — SIGNIFICANT CHANGE UP (ref 43–77)
NRBC # BLD: 0 /100 — SIGNIFICANT CHANGE UP (ref 0–0)
NRBC # BLD: SIGNIFICANT CHANGE UP /100 WBCS (ref 0–0)
PLAT MORPH BLD: NORMAL — SIGNIFICANT CHANGE UP
PLATELET # BLD AUTO: 282 K/UL — SIGNIFICANT CHANGE UP (ref 150–400)
POTASSIUM SERPL-SCNC: 4.7 MMOL/L
PROT SERPL-MCNC: 7.1 G/DL
RBC # BLD: 3.8 M/UL — SIGNIFICANT CHANGE UP (ref 3.8–5.2)
RBC # FLD: 12.1 % — SIGNIFICANT CHANGE UP (ref 10.3–14.5)
RBC BLD AUTO: ABNORMAL
SODIUM SERPL-SCNC: 140 MMOL/L
WBC # BLD: 1.83 K/UL — LOW (ref 3.8–10.5)
WBC # FLD AUTO: 1.83 K/UL — LOW (ref 3.8–10.5)

## 2023-09-28 PROCEDURE — 99214 OFFICE O/P EST MOD 30 MIN: CPT

## 2023-10-02 ENCOUNTER — APPOINTMENT (OUTPATIENT)
Dept: HEMATOLOGY ONCOLOGY | Facility: CLINIC | Age: 58
End: 2023-10-02

## 2023-10-02 LAB — CANCER AG27-29 SERPL-ACNC: 27.8 U/ML

## 2023-10-04 ENCOUNTER — APPOINTMENT (OUTPATIENT)
Dept: PALLIATIVE MEDICINE | Facility: CLINIC | Age: 58
End: 2023-10-04
Payer: MEDICARE

## 2023-10-04 DIAGNOSIS — Z51.5 ENCOUNTER FOR PALLIATIVE CARE: ICD-10-CM

## 2023-10-04 PROCEDURE — 99204 OFFICE O/P NEW MOD 45 MIN: CPT | Mod: 95

## 2023-10-17 ENCOUNTER — RESULT REVIEW (OUTPATIENT)
Age: 58
End: 2023-10-17

## 2023-11-03 ENCOUNTER — OUTPATIENT (OUTPATIENT)
Dept: OUTPATIENT SERVICES | Facility: HOSPITAL | Age: 58
LOS: 1 days | Discharge: HOME | End: 2023-11-03

## 2023-11-03 DIAGNOSIS — Z98.891 HISTORY OF UTERINE SCAR FROM PREVIOUS SURGERY: Chronic | ICD-10-CM

## 2023-11-03 DIAGNOSIS — C79.51 SECONDARY MALIGNANT NEOPLASM OF BONE: ICD-10-CM

## 2023-11-03 DIAGNOSIS — Z90.49 ACQUIRED ABSENCE OF OTHER SPECIFIED PARTS OF DIGESTIVE TRACT: Chronic | ICD-10-CM

## 2023-11-03 DIAGNOSIS — C50.911 MALIGNANT NEOPLASM OF UNSPECIFIED SITE OF RIGHT FEMALE BREAST: ICD-10-CM

## 2023-11-06 ENCOUNTER — APPOINTMENT (OUTPATIENT)
Dept: CT IMAGING | Facility: CLINIC | Age: 58
End: 2023-11-06
Payer: MEDICARE

## 2023-11-06 ENCOUNTER — OUTPATIENT (OUTPATIENT)
Dept: OUTPATIENT SERVICES | Facility: HOSPITAL | Age: 58
LOS: 1 days | End: 2023-11-06
Payer: MEDICARE

## 2023-11-06 DIAGNOSIS — Z98.891 HISTORY OF UTERINE SCAR FROM PREVIOUS SURGERY: Chronic | ICD-10-CM

## 2023-11-06 DIAGNOSIS — Z90.49 ACQUIRED ABSENCE OF OTHER SPECIFIED PARTS OF DIGESTIVE TRACT: Chronic | ICD-10-CM

## 2023-11-06 DIAGNOSIS — C50.911 MALIGNANT NEOPLASM OF UNSPECIFIED SITE OF RIGHT FEMALE BREAST: ICD-10-CM

## 2023-11-06 PROCEDURE — 71260 CT THORAX DX C+: CPT | Mod: 26,MH

## 2023-11-06 PROCEDURE — 71260 CT THORAX DX C+: CPT

## 2023-11-06 PROCEDURE — 74177 CT ABD & PELVIS W/CONTRAST: CPT | Mod: 26,MH

## 2023-11-06 PROCEDURE — 74177 CT ABD & PELVIS W/CONTRAST: CPT

## 2023-11-14 ENCOUNTER — APPOINTMENT (OUTPATIENT)
Dept: HEMATOLOGY ONCOLOGY | Facility: CLINIC | Age: 58
End: 2023-11-14
Payer: MEDICARE

## 2023-11-14 ENCOUNTER — RESULT REVIEW (OUTPATIENT)
Age: 58
End: 2023-11-14

## 2023-11-14 ENCOUNTER — APPOINTMENT (OUTPATIENT)
Dept: INFUSION THERAPY | Facility: HOSPITAL | Age: 58
End: 2023-11-14

## 2023-11-14 VITALS
TEMPERATURE: 98 F | BODY MASS INDEX: 37.12 KG/M2 | RESPIRATION RATE: 17 BRPM | HEART RATE: 98 BPM | OXYGEN SATURATION: 97 % | WEIGHT: 236.99 LBS | DIASTOLIC BLOOD PRESSURE: 85 MMHG | SYSTOLIC BLOOD PRESSURE: 119 MMHG

## 2023-11-14 DIAGNOSIS — F43.23 ADJUSTMENT DISORDER WITH MIXED ANXIETY AND DEPRESSED MOOD: ICD-10-CM

## 2023-11-14 LAB
BASOPHILS # BLD AUTO: 0.06 K/UL — SIGNIFICANT CHANGE UP (ref 0–0.2)
BASOPHILS # BLD AUTO: 0.06 K/UL — SIGNIFICANT CHANGE UP (ref 0–0.2)
BASOPHILS NFR BLD AUTO: 2.8 % — HIGH (ref 0–2)
BASOPHILS NFR BLD AUTO: 2.8 % — HIGH (ref 0–2)
EOSINOPHIL # BLD AUTO: 0.04 K/UL — SIGNIFICANT CHANGE UP (ref 0–0.5)
EOSINOPHIL # BLD AUTO: 0.04 K/UL — SIGNIFICANT CHANGE UP (ref 0–0.5)
EOSINOPHIL NFR BLD AUTO: 1.9 % — SIGNIFICANT CHANGE UP (ref 0–6)
EOSINOPHIL NFR BLD AUTO: 1.9 % — SIGNIFICANT CHANGE UP (ref 0–6)
HCT VFR BLD CALC: 39.3 % — SIGNIFICANT CHANGE UP (ref 34.5–45)
HCT VFR BLD CALC: 39.3 % — SIGNIFICANT CHANGE UP (ref 34.5–45)
HGB BLD-MCNC: 13.6 G/DL — SIGNIFICANT CHANGE UP (ref 11.5–15.5)
HGB BLD-MCNC: 13.6 G/DL — SIGNIFICANT CHANGE UP (ref 11.5–15.5)
IMM GRANULOCYTES NFR BLD AUTO: 0 % — SIGNIFICANT CHANGE UP (ref 0–0.9)
IMM GRANULOCYTES NFR BLD AUTO: 0 % — SIGNIFICANT CHANGE UP (ref 0–0.9)
LYMPHOCYTES # BLD AUTO: 0.65 K/UL — LOW (ref 1–3.3)
LYMPHOCYTES # BLD AUTO: 0.65 K/UL — LOW (ref 1–3.3)
LYMPHOCYTES # BLD AUTO: 30.5 % — SIGNIFICANT CHANGE UP (ref 13–44)
LYMPHOCYTES # BLD AUTO: 30.5 % — SIGNIFICANT CHANGE UP (ref 13–44)
MCHC RBC-ENTMCNC: 34.6 G/DL — SIGNIFICANT CHANGE UP (ref 32–36)
MCHC RBC-ENTMCNC: 34.6 G/DL — SIGNIFICANT CHANGE UP (ref 32–36)
MCHC RBC-ENTMCNC: 35.8 PG — HIGH (ref 27–34)
MCHC RBC-ENTMCNC: 35.8 PG — HIGH (ref 27–34)
MCV RBC AUTO: 103.4 FL — HIGH (ref 80–100)
MCV RBC AUTO: 103.4 FL — HIGH (ref 80–100)
MONOCYTES # BLD AUTO: 0.13 K/UL — SIGNIFICANT CHANGE UP (ref 0–0.9)
MONOCYTES # BLD AUTO: 0.13 K/UL — SIGNIFICANT CHANGE UP (ref 0–0.9)
MONOCYTES NFR BLD AUTO: 6.1 % — SIGNIFICANT CHANGE UP (ref 2–14)
MONOCYTES NFR BLD AUTO: 6.1 % — SIGNIFICANT CHANGE UP (ref 2–14)
NEUTROPHILS # BLD AUTO: 1.25 K/UL — LOW (ref 1.8–7.4)
NEUTROPHILS # BLD AUTO: 1.25 K/UL — LOW (ref 1.8–7.4)
NEUTROPHILS NFR BLD AUTO: 58.7 % — SIGNIFICANT CHANGE UP (ref 43–77)
NEUTROPHILS NFR BLD AUTO: 58.7 % — SIGNIFICANT CHANGE UP (ref 43–77)
NRBC # BLD: 0 /100 WBCS — SIGNIFICANT CHANGE UP (ref 0–0)
NRBC # BLD: 0 /100 WBCS — SIGNIFICANT CHANGE UP (ref 0–0)
PLATELET # BLD AUTO: 231 K/UL — SIGNIFICANT CHANGE UP (ref 150–400)
PLATELET # BLD AUTO: 231 K/UL — SIGNIFICANT CHANGE UP (ref 150–400)
RBC # BLD: 3.8 M/UL — SIGNIFICANT CHANGE UP (ref 3.8–5.2)
RBC # BLD: 3.8 M/UL — SIGNIFICANT CHANGE UP (ref 3.8–5.2)
RBC # FLD: 12.7 % — SIGNIFICANT CHANGE UP (ref 10.3–14.5)
RBC # FLD: 12.7 % — SIGNIFICANT CHANGE UP (ref 10.3–14.5)
WBC # BLD: 2.13 K/UL — LOW (ref 3.8–10.5)
WBC # BLD: 2.13 K/UL — LOW (ref 3.8–10.5)
WBC # FLD AUTO: 2.13 K/UL — LOW (ref 3.8–10.5)
WBC # FLD AUTO: 2.13 K/UL — LOW (ref 3.8–10.5)

## 2023-11-14 PROCEDURE — 99214 OFFICE O/P EST MOD 30 MIN: CPT

## 2023-11-15 ENCOUNTER — NON-APPOINTMENT (OUTPATIENT)
Age: 58
End: 2023-11-15

## 2023-11-15 LAB
ALBUMIN SERPL ELPH-MCNC: 4.3 G/DL
ALP BLD-CCNC: 67 U/L
ALT SERPL-CCNC: 24 U/L
ANION GAP SERPL CALC-SCNC: 11 MMOL/L
AST SERPL-CCNC: 18 U/L
BILIRUB SERPL-MCNC: 0.3 MG/DL
BUN SERPL-MCNC: 20 MG/DL
CALCIUM SERPL-MCNC: 9.6 MG/DL
CANCER AG27-29 SERPL-ACNC: 28 U/ML
CEA SERPL-MCNC: 7.2 NG/ML
CHLORIDE SERPL-SCNC: 106 MMOL/L
CO2 SERPL-SCNC: 24 MMOL/L
CREAT SERPL-MCNC: 1 MG/DL
EGFR: 66 ML/MIN/1.73M2
GLUCOSE SERPL-MCNC: 97 MG/DL
POTASSIUM SERPL-SCNC: 4.7 MMOL/L
PROT SERPL-MCNC: 7 G/DL
SODIUM SERPL-SCNC: 142 MMOL/L

## 2024-01-09 ENCOUNTER — OUTPATIENT (OUTPATIENT)
Dept: OUTPATIENT SERVICES | Facility: HOSPITAL | Age: 59
LOS: 1 days | Discharge: HOME | End: 2024-01-09

## 2024-01-09 DIAGNOSIS — C79.51 SECONDARY MALIGNANT NEOPLASM OF BONE: ICD-10-CM

## 2024-01-09 DIAGNOSIS — Z90.49 ACQUIRED ABSENCE OF OTHER SPECIFIED PARTS OF DIGESTIVE TRACT: Chronic | ICD-10-CM

## 2024-01-09 DIAGNOSIS — Z98.891 HISTORY OF UTERINE SCAR FROM PREVIOUS SURGERY: Chronic | ICD-10-CM

## 2024-01-09 DIAGNOSIS — C50.911 MALIGNANT NEOPLASM OF UNSPECIFIED SITE OF RIGHT FEMALE BREAST: ICD-10-CM

## 2024-01-16 ENCOUNTER — RESULT REVIEW (OUTPATIENT)
Age: 59
End: 2024-01-16

## 2024-01-16 ENCOUNTER — APPOINTMENT (OUTPATIENT)
Dept: HEMATOLOGY ONCOLOGY | Facility: CLINIC | Age: 59
End: 2024-01-16
Payer: MEDICARE

## 2024-01-16 VITALS
WEIGHT: 238.1 LBS | BODY MASS INDEX: 37.29 KG/M2 | HEART RATE: 108 BPM | TEMPERATURE: 97.9 F | DIASTOLIC BLOOD PRESSURE: 85 MMHG | OXYGEN SATURATION: 97 % | SYSTOLIC BLOOD PRESSURE: 123 MMHG | RESPIRATION RATE: 18 BRPM

## 2024-01-16 DIAGNOSIS — F51.04 PSYCHOPHYSIOLOGIC INSOMNIA: ICD-10-CM

## 2024-01-16 LAB
BASOPHILS # BLD AUTO: 0.02 K/UL — SIGNIFICANT CHANGE UP (ref 0–0.2)
BASOPHILS NFR BLD AUTO: 1 % — SIGNIFICANT CHANGE UP (ref 0–2)
EOSINOPHIL # BLD AUTO: 0 K/UL — SIGNIFICANT CHANGE UP (ref 0–0.5)
EOSINOPHIL NFR BLD AUTO: 0 % — SIGNIFICANT CHANGE UP (ref 0–6)
HCT VFR BLD CALC: 40 % — SIGNIFICANT CHANGE UP (ref 34.5–45)
HGB BLD-MCNC: 13.6 G/DL — SIGNIFICANT CHANGE UP (ref 11.5–15.5)
LYMPHOCYTES # BLD AUTO: 0.73 K/UL — LOW (ref 1–3.3)
LYMPHOCYTES # BLD AUTO: 30 % — SIGNIFICANT CHANGE UP (ref 13–44)
MCHC RBC-ENTMCNC: 34 G/DL — SIGNIFICANT CHANGE UP (ref 32–36)
MCHC RBC-ENTMCNC: 34.9 PG — HIGH (ref 27–34)
MCV RBC AUTO: 102.6 FL — HIGH (ref 80–100)
MONOCYTES # BLD AUTO: 0.15 K/UL — SIGNIFICANT CHANGE UP (ref 0–0.9)
MONOCYTES NFR BLD AUTO: 6 % — SIGNIFICANT CHANGE UP (ref 2–14)
MYELOCYTES NFR BLD: 2 % — HIGH (ref 0–0)
NEUTROPHILS # BLD AUTO: 1.48 K/UL — LOW (ref 1.8–7.4)
NEUTROPHILS NFR BLD AUTO: 61 % — SIGNIFICANT CHANGE UP (ref 43–77)
NRBC # BLD: 0 /100 WBCS — SIGNIFICANT CHANGE UP (ref 0–0)
NRBC # BLD: SIGNIFICANT CHANGE UP /100 WBCS (ref 0–0)
PLAT MORPH BLD: NORMAL — SIGNIFICANT CHANGE UP
PLATELET # BLD AUTO: 317 K/UL — SIGNIFICANT CHANGE UP (ref 150–400)
RBC # BLD: 3.9 M/UL — SIGNIFICANT CHANGE UP (ref 3.8–5.2)
RBC # FLD: 12.3 % — SIGNIFICANT CHANGE UP (ref 10.3–14.5)
RBC BLD AUTO: SIGNIFICANT CHANGE UP
WBC # BLD: 2.42 K/UL — LOW (ref 3.8–10.5)
WBC # FLD AUTO: 2.42 K/UL — LOW (ref 3.8–10.5)

## 2024-01-16 PROCEDURE — 99214 OFFICE O/P EST MOD 30 MIN: CPT

## 2024-01-16 NOTE — HISTORY OF PRESENT ILLNESS
[Disease: _____________________] : Disease: [unfilled] [T: ___] : T[unfilled] [N: ___] : N[unfilled] [M: ___] : M[unfilled] [AJCC Stage: ____] : AJCC Stage: [unfilled] [de-identified] : Right breast cancer diagnosed at the age of 52 11/14 Presented with right breast mastitis and treated with antibiotics Multiple recurrence of mastitis over next two years, 3-4 per year with increased frequency in 2016-17 6/13/16 She had a 3 mm punch biopsy of the right breast which revealed interstitial neutrophilic infiltrate 04/25/17 Bilateral breast mammogram showed no significant interval change in the appearance of the diffuse breast edema and diffuse skin thickening in the right breast with no specific evidence of adenopathy or underlying abnormality. New skin thickening and increase parenchymal density in the left LIQ. Finding are indeterminate and biopsy was suggested. Bilateral breast ultrasound showed diffuse right sided skin thickening with focal left medial new skin thickening. 06/02/17 Diagnostic mammogram revealed signs of breast edema with skin thickening, progressive since prior studies. D/DX cellulitis vs. mastitis vs. inflammatory breast cancer. 06/03/17 Bilateral breast ultrasound again showed nonspecific and recurrent signs of inflammation in the right breast, consisting of skin thickening and breast edema. Patient reported having pain, erythema, fever and shaking chills, slightly better with antibiotics. 06/22/17 Right central breast, sonoguided core biopsy showed  invasive ductal carcinoma, SBR 6/9, ER 95%, NH 30% and HER 2 negative and DCIS solid pattern with intermediate grade nuclear atypia. 06/26/17 MRI of the breast showed the recently diagnosed right breast malignancy, with diffuse skin thickening and asymmetric parenchymal enhancement. A 5 mm enhancing nodule  in the right upper outer breast, possibly representing intramammary lymph node. No suspicious findings in the left breast. 06/27/17 CT scan of C/A/P showed an irregular right sided breast thickening compatible with biopsy proven right breast cancer. Osseous metastases involving the T10, T11 and L4 vertebral bodies. Indeterminate 2 cm left adrenal nodule. 06/27/17 Bone scan showed abnormal bone scan compatible with multifocal osseous metastases in the thoracolumbar spine, left iliac bone and in the right acetabulum. 7/14/17 BSO  7/21/17 Started letrozole 2.5 mg daily and Ibrance 125 mg for days 1-21 every 28 days 7/2017 Xgeva started every 3 months and then decreased to every 6 months in 2018 due to good cancer control and to lower risk of ONJ [de-identified] : Invasive ductal carcinoma, ER 95%, NC 30% and HER 2 negative [de-identified] : Nasrin started letrozole and Ibrance in 7/17 and continues to do well overall with some neutropenia and fatigue and arthralgias.  The arthralgias were her greatest issue but are much better since starting Celebrex 100 mg bid. She still gets some hot flashes, but they are mild and very manageable.  She had an episode of right breast mastitis 7/2020 and then called 12/7/21 to report left mastitis, which she had not previously had. She took Keflex for 7 days with resolution of her symptoms in 3 days. No further episodes.  Her older daughters (austin, Janiya and Ivonne) just graduated from [employer] [employer] in speech pathology and hospital administration/. Her younger daughter (Cassie) just completed her masters degree at [employer] in business management. She is trying out for the [place] rowing team (father is a citizen) and is continuing to train at [employer] for now. One of her 25 year-old twins (Janiya) was diagnosed with a large ovarian mass in 9/23. She has a history of polycystic ovary syndrome. She had a USO at [facility] which showed well differentiated Stage IA ovarian cancer, so no chemo was recommended. She recently started a new job after graduating with her masters degree.  11/6/23 CT scans showed stable bone metastases and a stable left adrenal nodule. 07/10/23 CT scans showed stable sclerotic bony metastases. No evidence of new or increasing metastatic disease. 07/10/23 Bone scan showed Metastatic disease not significantly changed compared to the previous bone scan of 7/14/2022. No new osseous lesions. 03/09/23 CT scans showed extensive asymmetric right breast thickening again noted. A 2.3 cm left adrenal mass is without signal change dating back to 7/16/2019. Bilateral renal cysts, largest 11 cm in the right kidney. 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 No hydronephrosis. Multiple sclerotic metastases appear unchanged. Mild degenerative changes in the spine. No significant interval change since prior. No new findings. 11/9/22 CT scans showed stable sclerotic bony metastases and no evidence of new or increasing metastatic disease. 7/14/22 CT scans showed stable sclerotic bony metastases and no new metastatic disease 7/5/22 Bone scan showed stable disease and no new lesions 3/14/22 CT scan C/A/P showed unchanged extent of osteoblastic osseous metastases and no new metastatic disease. 1/31/22 Breast MRI showed diffuse mild right breast skin thickening and parenchymal edema with no foal enhancing mass. No suspicious enhancement in the left breast. No significant axillary or internal mammary lymphadenopathy. 11/2/21 CT scan C/A/P showed stable sclerotic osseous metastatic lesions without significant change. No evidence of any new cancer lesions on scan. 07/09/21 Bone scan shows no new osseous lesions and no significant change in existing lesions. 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 07/09/21 CT scan C/A/P shows stable osseous lesions and no new findings 03/02/21 CT scan C/A/P showed grossly stable bone metastases. No other evidence for metastatic disease. Stable skin thickening of the right breast. 10/29/20 CT scan C/A/P showed multiple blastic bone metastases demonstrating no change since the prior exam. No other evidence of metastatic disease. 07/10/20 Bone scan showed osseous metastatic disease not significantly changed. Degenerative disease in the major joints. No new osseous lesions. 07/10/20 CT scan showed Stable exam. Sclerotic osseous metastases unchanged 3/11/20 CT scan showed Osseous metastatic disease, unchanged. No new foci of metastatic disease. 7/16/19 CT Scan showed stable examination. Sclerotic osseous metastatic disease without significant change. 7/16/19 Bone scan showed no significant interval change compared to the previous bone scan of 7/11/18, showing resolved or less prominent metastatic lesions from 6/27/17. No new osseous lesions are identified. 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  CT scan from 3/15/19 showed stable 2 cm left adrenal adenoma, small likely angiolipoma upper pole left kidney and right renal cyst unchanged. Stable radiographic distribution of sclerotic metastases.  CT scan from 11/2/18: Stable with no new findings CT scan from 7/11/18: Stable with no new findings Bone scan 7/11/18 stable to improved with no new areas of disease CT scan from 4/9/18: Stable osseous metastases and no new findings. CT scan from 1/16/18: Stable osseous metastases. No radiographic evidence of visceral metastasis with a stable 2 cm indeterminate left adrenal nodule CT scan from 11/11/2019 Stable osseous metastases, no new areas of disease

## 2024-01-16 NOTE — PHYSICAL EXAM
[Fully active, able to carry on all pre-disease performance without restriction] : Status 0 - Fully active, able to carry on all pre-disease performance without restriction [Normal] : affect appropriate [de-identified] : Right breast with increasingly normal tissue and less firmness from cancer, central mass still present but much smaller

## 2024-01-19 LAB
ALBUMIN SERPL ELPH-MCNC: 4.5 G/DL
ALP BLD-CCNC: 70 U/L
ALT SERPL-CCNC: 20 U/L
ANION GAP SERPL CALC-SCNC: 13 MMOL/L
AST SERPL-CCNC: 19 U/L
BILIRUB SERPL-MCNC: 0.3 MG/DL
BUN SERPL-MCNC: 23 MG/DL
CALCIUM SERPL-MCNC: 10.7 MG/DL
CANCER AG27-29 SERPL-ACNC: 35.7 U/ML
CEA SERPL-MCNC: 9.2 NG/ML
CHLORIDE SERPL-SCNC: 101 MMOL/L
CO2 SERPL-SCNC: 25 MMOL/L
CREAT SERPL-MCNC: 1.03 MG/DL
EGFR: 63 ML/MIN/1.73M2
GLUCOSE SERPL-MCNC: 98 MG/DL
POTASSIUM SERPL-SCNC: 4.7 MMOL/L
PROT SERPL-MCNC: 7.1 G/DL
SODIUM SERPL-SCNC: 138 MMOL/L

## 2024-02-02 ENCOUNTER — RESULT REVIEW (OUTPATIENT)
Age: 59
End: 2024-02-02

## 2024-03-04 ENCOUNTER — OUTPATIENT (OUTPATIENT)
Dept: OUTPATIENT SERVICES | Facility: HOSPITAL | Age: 59
LOS: 1 days | End: 2024-03-04
Payer: MEDICARE

## 2024-03-04 ENCOUNTER — APPOINTMENT (OUTPATIENT)
Dept: CT IMAGING | Facility: CLINIC | Age: 59
End: 2024-03-04
Payer: MEDICARE

## 2024-03-04 DIAGNOSIS — C50.911 MALIGNANT NEOPLASM OF UNSPECIFIED SITE OF RIGHT FEMALE BREAST: ICD-10-CM

## 2024-03-04 DIAGNOSIS — Z90.49 ACQUIRED ABSENCE OF OTHER SPECIFIED PARTS OF DIGESTIVE TRACT: Chronic | ICD-10-CM

## 2024-03-04 DIAGNOSIS — Z98.891 HISTORY OF UTERINE SCAR FROM PREVIOUS SURGERY: Chronic | ICD-10-CM

## 2024-03-04 PROCEDURE — 74177 CT ABD & PELVIS W/CONTRAST: CPT

## 2024-03-04 PROCEDURE — 74177 CT ABD & PELVIS W/CONTRAST: CPT | Mod: 26,MH

## 2024-03-04 PROCEDURE — 71260 CT THORAX DX C+: CPT

## 2024-03-04 PROCEDURE — 71260 CT THORAX DX C+: CPT | Mod: 26,MH

## 2024-03-08 ENCOUNTER — OUTPATIENT (OUTPATIENT)
Dept: OUTPATIENT SERVICES | Facility: HOSPITAL | Age: 59
LOS: 1 days | Discharge: HOME | End: 2024-03-08

## 2024-03-08 DIAGNOSIS — C50.911 MALIGNANT NEOPLASM OF UNSPECIFIED SITE OF RIGHT FEMALE BREAST: ICD-10-CM

## 2024-03-08 DIAGNOSIS — Z90.49 ACQUIRED ABSENCE OF OTHER SPECIFIED PARTS OF DIGESTIVE TRACT: Chronic | ICD-10-CM

## 2024-03-08 DIAGNOSIS — C79.51 SECONDARY MALIGNANT NEOPLASM OF BONE: ICD-10-CM

## 2024-03-08 DIAGNOSIS — Z98.891 HISTORY OF UTERINE SCAR FROM PREVIOUS SURGERY: Chronic | ICD-10-CM

## 2024-03-18 PROBLEM — L03.90 CELLULITIS: Status: ACTIVE | Noted: 2017-08-21

## 2024-03-18 PROBLEM — F06.31 DEPRESSION DUE TO PHYSICAL ILLNESS: Status: ACTIVE | Noted: 2018-02-16

## 2024-03-19 ENCOUNTER — RESULT REVIEW (OUTPATIENT)
Age: 59
End: 2024-03-19

## 2024-03-19 ENCOUNTER — APPOINTMENT (OUTPATIENT)
Dept: HEMATOLOGY ONCOLOGY | Facility: CLINIC | Age: 59
End: 2024-03-19
Payer: MEDICARE

## 2024-03-19 VITALS
SYSTOLIC BLOOD PRESSURE: 148 MMHG | WEIGHT: 238.1 LBS | HEART RATE: 100 BPM | TEMPERATURE: 97.9 F | OXYGEN SATURATION: 96 % | BODY MASS INDEX: 37.29 KG/M2 | DIASTOLIC BLOOD PRESSURE: 93 MMHG | RESPIRATION RATE: 18 BRPM

## 2024-03-19 DIAGNOSIS — L03.90 CELLULITIS, UNSPECIFIED: ICD-10-CM

## 2024-03-19 DIAGNOSIS — F06.31 MOOD DISORDER DUE TO KNOWN PHYSIOLOGICAL CONDITION WITH DEPRESSIVE FEATURES: ICD-10-CM

## 2024-03-19 LAB
BASOPHILS # BLD AUTO: 0.05 K/UL — SIGNIFICANT CHANGE UP (ref 0–0.2)
BASOPHILS NFR BLD AUTO: 2 % — SIGNIFICANT CHANGE UP (ref 0–2)
EOSINOPHIL # BLD AUTO: 0.05 K/UL — SIGNIFICANT CHANGE UP (ref 0–0.5)
EOSINOPHIL NFR BLD AUTO: 2 % — SIGNIFICANT CHANGE UP (ref 0–6)
HCT VFR BLD CALC: 38.3 % — SIGNIFICANT CHANGE UP (ref 34.5–45)
HGB BLD-MCNC: 13.1 G/DL — SIGNIFICANT CHANGE UP (ref 11.5–15.5)
IMM GRANULOCYTES NFR BLD AUTO: 0 % — SIGNIFICANT CHANGE UP (ref 0–0.9)
LYMPHOCYTES # BLD AUTO: 0.59 K/UL — LOW (ref 1–3.3)
LYMPHOCYTES # BLD AUTO: 23.7 % — SIGNIFICANT CHANGE UP (ref 13–44)
MCHC RBC-ENTMCNC: 34.2 G/DL — SIGNIFICANT CHANGE UP (ref 32–36)
MCHC RBC-ENTMCNC: 35.5 PG — HIGH (ref 27–34)
MCV RBC AUTO: 103.8 FL — HIGH (ref 80–100)
MONOCYTES # BLD AUTO: 0.19 K/UL — SIGNIFICANT CHANGE UP (ref 0–0.9)
MONOCYTES NFR BLD AUTO: 7.6 % — SIGNIFICANT CHANGE UP (ref 2–14)
NEUTROPHILS # BLD AUTO: 1.61 K/UL — LOW (ref 1.8–7.4)
NEUTROPHILS NFR BLD AUTO: 64.7 % — SIGNIFICANT CHANGE UP (ref 43–77)
NRBC # BLD: 0 /100 WBCS — SIGNIFICANT CHANGE UP (ref 0–0)
PLATELET # BLD AUTO: 199 K/UL — SIGNIFICANT CHANGE UP (ref 150–400)
RBC # BLD: 3.69 M/UL — LOW (ref 3.8–5.2)
RBC # FLD: 12.5 % — SIGNIFICANT CHANGE UP (ref 10.3–14.5)
WBC # BLD: 2.49 K/UL — LOW (ref 3.8–10.5)
WBC # FLD AUTO: 2.49 K/UL — LOW (ref 3.8–10.5)

## 2024-03-19 PROCEDURE — 99214 OFFICE O/P EST MOD 30 MIN: CPT

## 2024-03-21 LAB
ALBUMIN SERPL ELPH-MCNC: 4.4 G/DL
ALP BLD-CCNC: 66 U/L
ALT SERPL-CCNC: 24 U/L
ANION GAP SERPL CALC-SCNC: 17 MMOL/L
AST SERPL-CCNC: 22 U/L
BILIRUB SERPL-MCNC: 0.2 MG/DL
BUN SERPL-MCNC: 28 MG/DL
CALCIUM SERPL-MCNC: 10.2 MG/DL
CEA SERPL-MCNC: 11.4 NG/ML
CHLORIDE SERPL-SCNC: 107 MMOL/L
CO2 SERPL-SCNC: 22 MMOL/L
CREAT SERPL-MCNC: 0.96 MG/DL
EGFR: 69 ML/MIN/1.73M2
GLUCOSE SERPL-MCNC: 93 MG/DL
POTASSIUM SERPL-SCNC: 4.7 MMOL/L
PROT SERPL-MCNC: 6.9 G/DL
SODIUM SERPL-SCNC: 146 MMOL/L

## 2024-03-25 LAB — CANCER AG27-29 SERPL-ACNC: 43.3 U/ML

## 2024-03-29 ENCOUNTER — NON-APPOINTMENT (OUTPATIENT)
Age: 59
End: 2024-03-29

## 2024-03-29 NOTE — PHYSICAL EXAM
[de-identified] : Right breast with increasingly normal tissue, central mass no longer clearly palpable

## 2024-03-29 NOTE — HISTORY OF PRESENT ILLNESS
[de-identified] : Right breast cancer diagnosed at the age of 52 11/14 Presented with right breast mastitis and treated with antibiotics Multiple recurrence of mastitis over next two years, 3-4 per year with increased frequency in 2016-17 6/13/16 She had a 3 mm punch biopsy of the right breast which revealed interstitial neutrophilic infiltrate 04/25/17 Bilateral breast mammogram showed no significant interval change in the appearance of the diffuse breast edema and diffuse skin thickening in the right breast with no specific evidence of adenopathy or underlying abnormality. New skin thickening and increase parenchymal density in the left LIQ. Finding are indeterminate and biopsy was suggested. Bilateral breast ultrasound showed diffuse right sided skin thickening with focal left medial new skin thickening. 06/02/17 Diagnostic mammogram revealed signs of breast edema with skin thickening, progressive since prior studies. D/DX cellulitis vs. mastitis vs. inflammatory breast cancer. 06/03/17 Bilateral breast ultrasound again showed nonspecific and recurrent signs of inflammation in the right breast, consisting of skin thickening and breast edema. Patient reported having pain, erythema, fever and shaking chills, slightly better with antibiotics. 06/22/17 Right central breast, sonoguided core biopsy showed  invasive ductal carcinoma, SBR 6/9, ER 95%, PA 30% and HER 2 negative and DCIS solid pattern with intermediate grade nuclear atypia. 06/26/17 MRI of the breast showed the recently diagnosed right breast malignancy, with diffuse skin thickening and asymmetric parenchymal enhancement. A 5 mm enhancing nodule  in the right upper outer breast, possibly representing intramammary lymph node. No suspicious findings in the left breast. 06/27/17 CT scan of C/A/P showed an irregular right sided breast thickening compatible with biopsy proven right breast cancer. Osseous metastases involving the T10, T11 and L4 vertebral bodies. Indeterminate 2 cm left adrenal nodule. 06/27/17 Bone scan showed abnormal bone scan compatible with multifocal osseous metastases in the thoracolumbar spine, left iliac bone and in the right acetabulum. 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  [de-identified] : Invasive ductal carcinoma, ER 95%, AK 30% and HER 2 negative [de-identified] : Nasrin started letrozole and Ibrance in 7/17 and continues to do well overall with some neutropenia and fatigue and arthralgias.  The arthralgias were her greatest issue but are much better since starting Celebrex 100 mg bid. She still gets some hot flashes, but they are mild and very manageable.  She had an episode of right breast mastitis 7/2020 and then aguilar 12/7/21 to report left mastitis, which she had not previously had. She took Keflex for 7 days with resolution of her symptoms in 3 days. No further episodes.  Her older daughters (austin, Janiya and Ivonne) just graduated from [employer] in speech pathology and hospital administration/. Her younger daughter (Cassie) just completed her masters degree at [employer] in business management. She is trying out for the [place] rowing team (father is a citizen) and is continuing to train at [employer] for now. One of her 25 year-old twins (Janiya) was diagnosed with a large ovarian mass in 9/23. She has a history of polycystic ovary syndrome. She had a USO at [facility] which showed well differentiated Stage IA ovarian cancer, so no chemo was recommended. She recently started a new job after graduating with her masters degree.  3/4/24 CT scans showed unchanged bony metastases. No lymphadenopathy. Stable left adrenal nodule. 11/6/23 CT scans showed stable bone metastases and a stable left adrenal nodule. 07/10/23 CT scans showed stable sclerotic bony metastases. No evidence of new or increasing metastatic disease. 07/10/23 Bone scan showed Metastatic disease not significantly changed compared to the previous bone scan of 7/14/2022. No new osseous lesions. 03/09/23 CT scans showed extensive asymmetric right breast thickening again noted. A 2.3 cm left adrenal mass is without signal change dating back to 7/16/2019. Bilateral renal cysts, largest 11 cm in the right kidney. 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 No hydronephrosis. Multiple sclerotic metastases appear unchanged. Mild degenerative changes in the spine. No significant interval change since prior. No new findings. 11/9/22 CT scans showed stable sclerotic bony metastases and no evidence of new or increasing metastatic disease. 7/14/22 CT scans showed stable sclerotic bony metastases and no new metastatic disease 7/5/22 Bone scan showed stable disease and no new lesions 3/14/22 CT scan C/A/P showed unchanged extent of osteoblastic osseous metastases and no new metastatic disease. 1/31/22 Breast MRI showed diffuse mild right breast skin thickening and parenchymal edema with no foal enhancing mass. No suspicious enhancement in the left breast. No significant axillary or internal mammary lymphadenopathy. 11/2/21 CT scan C/A/P showed stable sclerotic osseous metastatic lesions without significant change. No evidence of any new cancer lesions on scan. 07/09/21 Bone scan shows no new osseous lesions and no significant change in existing lesions. 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 07/09/21 CT scan C/A/P shows stable osseous lesions and no new findings 03/02/21 CT scan C/A/P showed grossly stable bone metastases. No other evidence for metastatic disease. Stable skin thickening of the right breast. 10/29/20 CT scan C/A/P showed multiple blastic bone metastases demonstrating no change since the prior exam. No other evidence of metastatic disease. 07/10/20 Bone scan showed osseous metastatic disease not significantly changed. Degenerative disease in the major joints. No new osseous lesions. 07/10/20 CT scan showed Stable exam. Sclerotic osseous metastases unchanged 3/11/20 CT scan showed Osseous metastatic disease, unchanged. No new foci of metastatic disease. 7/16/19 CT Scan showed stable examination. Sclerotic osseous metastatic disease without significant change. 7/16/19 Bone scan showed no significant interval change compared to the previous bone scan of 7/11/18, showing resolved or less prominent metastatic lesions from 6/27/17. No new osseous lesions are identified. 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  CT scan from 3/15/19 showed stable 2 cm left adrenal adenoma, small likely angiolipoma upper pole left kidney and right renal cyst unchanged. Stable radiographic distribution of sclerotic metastases.  CT scan from 11/2/18: Stable with no new findings CT scan from 7/11/18: Stable with no new findings Bone scan 7/11/18 stable to improved with no new areas of disease CT scan from 4/9/18: Stable osseous metastases and no new findings. CT scan from 1/16/18: Stable osseous metastases. No radiographic evidence of visceral metastasis with a stable 2 cm indeterminate left adrenal nodule CT scan from 11/11/2019 Stable osseous metastases, no new areas of disease

## 2024-05-09 ENCOUNTER — OUTPATIENT (OUTPATIENT)
Dept: OUTPATIENT SERVICES | Facility: HOSPITAL | Age: 59
LOS: 1 days | Discharge: HOME | End: 2024-05-09

## 2024-05-09 DIAGNOSIS — Z98.891 HISTORY OF UTERINE SCAR FROM PREVIOUS SURGERY: Chronic | ICD-10-CM

## 2024-05-09 DIAGNOSIS — C79.51 SECONDARY MALIGNANT NEOPLASM OF BONE: ICD-10-CM

## 2024-05-09 DIAGNOSIS — Z90.49 ACQUIRED ABSENCE OF OTHER SPECIFIED PARTS OF DIGESTIVE TRACT: Chronic | ICD-10-CM

## 2024-05-09 DIAGNOSIS — C50.911 MALIGNANT NEOPLASM OF UNSPECIFIED SITE OF RIGHT FEMALE BREAST: ICD-10-CM

## 2024-05-20 ENCOUNTER — NON-APPOINTMENT (OUTPATIENT)
Age: 59
End: 2024-05-20

## 2024-05-21 ENCOUNTER — APPOINTMENT (OUTPATIENT)
Dept: HEMATOLOGY ONCOLOGY | Facility: CLINIC | Age: 59
End: 2024-05-21

## 2024-05-21 ENCOUNTER — RESULT REVIEW (OUTPATIENT)
Age: 59
End: 2024-05-21

## 2024-05-21 ENCOUNTER — APPOINTMENT (OUTPATIENT)
Dept: INFUSION THERAPY | Facility: HOSPITAL | Age: 59
End: 2024-05-21

## 2024-05-21 ENCOUNTER — APPOINTMENT (OUTPATIENT)
Dept: HEMATOLOGY ONCOLOGY | Facility: CLINIC | Age: 59
End: 2024-05-21
Payer: MEDICARE

## 2024-05-21 VITALS
SYSTOLIC BLOOD PRESSURE: 129 MMHG | RESPIRATION RATE: 18 BRPM | HEIGHT: 67 IN | OXYGEN SATURATION: 98 % | HEART RATE: 99 BPM | TEMPERATURE: 98.3 F | BODY MASS INDEX: 37 KG/M2 | WEIGHT: 235.76 LBS | DIASTOLIC BLOOD PRESSURE: 74 MMHG

## 2024-05-21 DIAGNOSIS — R23.2 FLUSHING: ICD-10-CM

## 2024-05-21 DIAGNOSIS — C79.51 MALIGNANT NEOPLASM OF UNSPECIFIED SITE OF RIGHT FEMALE BREAST: ICD-10-CM

## 2024-05-21 DIAGNOSIS — D70.2 OTHER DRUG-INDUCED AGRANULOCYTOSIS: ICD-10-CM

## 2024-05-21 DIAGNOSIS — T45.1X5A FLUSHING: ICD-10-CM

## 2024-05-21 DIAGNOSIS — C50.911 MALIGNANT NEOPLASM OF UNSPECIFIED SITE OF RIGHT FEMALE BREAST: ICD-10-CM

## 2024-05-21 DIAGNOSIS — M25.50 PAIN IN UNSPECIFIED JOINT: ICD-10-CM

## 2024-05-21 LAB
BASOPHILS # BLD AUTO: 0.08 K/UL — SIGNIFICANT CHANGE UP (ref 0–0.2)
BASOPHILS NFR BLD AUTO: 3.1 % — HIGH (ref 0–2)
EOSINOPHIL # BLD AUTO: 0.03 K/UL — SIGNIFICANT CHANGE UP (ref 0–0.5)
EOSINOPHIL NFR BLD AUTO: 1.2 % — SIGNIFICANT CHANGE UP (ref 0–6)
HCT VFR BLD CALC: 40.7 % — SIGNIFICANT CHANGE UP (ref 34.5–45)
HGB BLD-MCNC: 13.8 G/DL — SIGNIFICANT CHANGE UP (ref 11.5–15.5)
IMM GRANULOCYTES NFR BLD AUTO: 0.4 % — SIGNIFICANT CHANGE UP (ref 0–0.9)
LYMPHOCYTES # BLD AUTO: 0.73 K/UL — LOW (ref 1–3.3)
LYMPHOCYTES # BLD AUTO: 28.4 % — SIGNIFICANT CHANGE UP (ref 13–44)
MCHC RBC-ENTMCNC: 33.9 G/DL — SIGNIFICANT CHANGE UP (ref 32–36)
MCHC RBC-ENTMCNC: 35.8 PG — HIGH (ref 27–34)
MCV RBC AUTO: 105.4 FL — HIGH (ref 80–100)
MONOCYTES # BLD AUTO: 0.44 K/UL — SIGNIFICANT CHANGE UP (ref 0–0.9)
MONOCYTES NFR BLD AUTO: 17.1 % — HIGH (ref 2–14)
NEUTROPHILS # BLD AUTO: 1.28 K/UL — LOW (ref 1.8–7.4)
NEUTROPHILS NFR BLD AUTO: 49.8 % — SIGNIFICANT CHANGE UP (ref 43–77)
NRBC # BLD: 0 /100 WBCS — SIGNIFICANT CHANGE UP (ref 0–0)
PLATELET # BLD AUTO: 123 K/UL — LOW (ref 150–400)
RBC # BLD: 3.86 M/UL — SIGNIFICANT CHANGE UP (ref 3.8–5.2)
RBC # FLD: 13 % — SIGNIFICANT CHANGE UP (ref 10.3–14.5)
WBC # BLD: 2.57 K/UL — LOW (ref 3.8–10.5)
WBC # FLD AUTO: 2.57 K/UL — LOW (ref 3.8–10.5)

## 2024-05-21 PROCEDURE — 99215 OFFICE O/P EST HI 40 MIN: CPT

## 2024-05-21 RX ORDER — PALBOCICLIB 125 MG/1
125 TABLET, FILM COATED ORAL
Qty: 21 | Refills: 3 | Status: ACTIVE | COMMUNITY
Start: 2020-08-11 | End: 1900-01-01

## 2024-05-21 RX ORDER — CELECOXIB 200 MG/1
200 CAPSULE ORAL DAILY
Qty: 90 | Refills: 1 | Status: ACTIVE | COMMUNITY
Start: 2021-05-11 | End: 1900-01-01

## 2024-05-21 RX ORDER — LETROZOLE TABLETS 2.5 MG/1
2.5 TABLET, FILM COATED ORAL DAILY
Qty: 90 | Refills: 1 | Status: ACTIVE | COMMUNITY
Start: 2021-02-22 | End: 1900-01-01

## 2024-05-21 RX ORDER — BUPROPION HYDROCHLORIDE 150 MG/1
150 TABLET, EXTENDED RELEASE ORAL DAILY
Qty: 90 | Refills: 3 | Status: ACTIVE | COMMUNITY
Start: 2018-02-16 | End: 1900-01-01

## 2024-05-21 NOTE — HISTORY OF PRESENT ILLNESS
[Disease: _____________________] : Disease: [unfilled] [T: ___] : T[unfilled] [N: ___] : N[unfilled] [M: ___] : M[unfilled] [AJCC Stage: ____] : AJCC Stage: [unfilled] [de-identified] : Right breast cancer diagnosed at the age of 52 11/14 Presented with right breast mastitis and treated with antibiotics Multiple recurrence of mastitis over next two years, 3-4 per year with increased frequency in 2016-17 6/13/16 She had a 3 mm punch biopsy of the right breast which revealed interstitial neutrophilic infiltrate 04/25/17 Bilateral breast mammogram showed no significant interval change in the appearance of the diffuse breast edema and diffuse skin thickening in the right breast with no specific evidence of adenopathy or underlying abnormality. New skin thickening and increase parenchymal density in the left LIQ. Finding are indeterminate and biopsy was suggested. Bilateral breast ultrasound showed diffuse right sided skin thickening with focal left medial new skin thickening. 06/02/17 Diagnostic mammogram revealed signs of breast edema with skin thickening, progressive since prior studies. D/DX cellulitis vs. mastitis vs. inflammatory breast cancer. 06/03/17 Bilateral breast ultrasound again showed nonspecific and recurrent signs of inflammation in the right breast, consisting of skin thickening and breast edema. Patient reported having pain, erythema, fever and shaking chills, slightly better with antibiotics. 06/22/17 Right central breast, sonoguided core biopsy showed  invasive ductal carcinoma, SBR 6/9, ER 95%, TN 30% and HER 2 negative and DCIS solid pattern with intermediate grade nuclear atypia. 06/26/17 MRI of the breast showed the recently diagnosed right breast malignancy, with diffuse skin thickening and asymmetric parenchymal enhancement. A 5 mm enhancing nodule  in the right upper outer breast, possibly representing intramammary lymph node. No suspicious findings in the left breast. 06/27/17 CT scan of C/A/P showed an irregular right sided breast thickening compatible with biopsy proven right breast cancer. Osseous metastases involving the T10, T11 and L4 vertebral bodies. Indeterminate 2 cm left adrenal nodule. 06/27/17 Bone scan showed abnormal bone scan compatible with multifocal osseous metastases in the thoracolumbar spine, left iliac bone and in the right acetabulum. 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  [de-identified] : Invasive ductal carcinoma, ER 95%, ND 30% and HER 2 negative [de-identified] : Nasrin started letrozole and Ibrance in 7/17 and continues to do well overall with some neutropenia and fatigue and arthralgias.  The arthralgias were her greatest issue but are better since starting Celebrex 200 mg q am. Recently she has been feeling more achy all over. She still gets some hot flashes, but they are mild and very manageable.  She has had two recent cardiac episodes that each lasted about 5 minutes where she felt her heart pounding with associated chest pressure, but no radiating pain. She felt dizzy when she stood up. This happened once at night and woke her up and the second time during the day when she was sitting. She got a cold about 2 months ago which has persisted. She went to Urgent Care and they gave her an inhaler but the cough is still lingering. She has dry cough, worse at night. She had an episode of right breast mastitis 7/2020 and then called 12/7/21 to report left mastitis, which she had not previously had. She took Keflex for 7 days with resolution of her symptoms in 3 days. No further episodes.  Her older daughters (twins) graduated in 2023 from [employer] in speech pathology (Janiya) and hospital administration/ (Ivonne). Her younger daughter (Cassie) completed her masters degree in 5/23 at [employer] in business management. She is trying out for the [employer] team (father is a citizen) and will be training there for a month this summer. One of her 25 year-old twins (Janiya) was diagnosed with a large ovarian mass in 9/23. She has a history of polycystic ovary syndrome. She had a USO at [facility] which showed well differentiated Stage IA ovarian cancer, so no chemo was recommended.  3/4/24 CT scans showed unchanged bony metastases. No lymphadenopathy. Stable left adrenal nodule. 11/6/23 CT scans showed stable bone metastases and a stable left adrenal nodule. 07/10/23 CT scans showed stable sclerotic bony metastases. No evidence of new or increasing metastatic disease. 07/10/23 Bone scan showed Metastatic disease not significantly changed compared to the previous bone scan of 7/14/2022. No new osseous lesions. 03/09/23 CT scans showed extensive asymmetric right breast thickening again noted. A 2.3 cm left adrenal mass is without signal change dating back to 7/16/2019. Bilateral renal cysts, largest 11 cm in the right kidney. 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 No hydronephrosis. Multiple sclerotic metastases appear unchanged. Mild degenerative changes in the spine. No significant interval change since prior. No new findings. 11/9/22 CT scans showed stable sclerotic bony metastases and no evidence of new or increasing metastatic disease. 7/14/22 CT scans showed stable sclerotic bony metastases and no new metastatic disease 7/5/22 Bone scan showed stable disease and no new lesions 3/14/22 CT scan C/A/P showed unchanged extent of osteoblastic osseous metastases and no new metastatic disease. 1/31/22 Breast MRI showed diffuse mild right breast skin thickening and parenchymal edema with no foal enhancing mass. No suspicious enhancement in the left breast. No significant axillary or internal mammary lymphadenopathy. 11/2/21 CT scan C/A/P showed stable sclerotic osseous metastatic lesions without significant change. No evidence of any new cancer lesions on scan. 07/09/21 Bone scan shows no new osseous lesions and no significant change in existing lesions. 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 07/09/21 CT scan C/A/P shows stable osseous lesions and no new findings 03/02/21 CT scan C/A/P showed grossly stable bone metastases. No other evidence for metastatic disease. Stable skin thickening of the right breast. 10/29/20 CT scan C/A/P showed multiple blastic bone metastases demonstrating no change since the prior exam. No other evidence of metastatic disease. 07/10/20 Bone scan showed osseous metastatic disease not significantly changed. Degenerative disease in the major joints. No new osseous lesions. 07/10/20 CT scan showed Stable exam. Sclerotic osseous metastases unchanged 3/11/20 CT scan showed Osseous metastatic disease, unchanged. No new foci of metastatic disease. 7/16/19 CT Scan showed stable examination. Sclerotic osseous metastatic disease without significant change. 7/16/19 Bone scan showed no significant interval change compared to the previous bone scan of 7/11/18, showing resolved or less prominent metastatic lesions from 6/27/17. No new osseous lesions are identified. 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  CT scan from 3/15/19 showed stable 2 cm left adrenal adenoma, small likely angiolipoma upper pole left kidney and right renal cyst unchanged. Stable radiographic distribution of sclerotic metastases.  CT scan from 11/2/18: Stable with no new findings CT scan from 7/11/18: Stable with no new findings Bone scan 7/11/18 stable to improved with no new areas of disease CT scan from 4/9/18: Stable osseous metastases and no new findings. CT scan from 1/16/18: Stable osseous metastases. No radiographic evidence of visceral metastasis with a stable 2 cm indeterminate left adrenal nodule CT scan from 11/11/2019 Stable osseous metastases, no new areas of disease

## 2024-05-21 NOTE — PHYSICAL EXAM
[Fully active, able to carry on all pre-disease performance without restriction] : Status 0 - Fully active, able to carry on all pre-disease performance without restriction [Normal] : affect appropriate [de-identified] : Right breast with increasingly normal tissue, central mass no longer clearly palpable

## 2024-05-23 LAB
ALBUMIN SERPL ELPH-MCNC: 4.4 G/DL
ALP BLD-CCNC: 75 U/L
ALT SERPL-CCNC: 88 U/L
ANION GAP SERPL CALC-SCNC: 13 MMOL/L
AST SERPL-CCNC: 62 U/L
BILIRUB SERPL-MCNC: 0.3 MG/DL
BUN SERPL-MCNC: 28 MG/DL
CALCIUM SERPL-MCNC: 9.4 MG/DL
CANCER AG27-29 SERPL-ACNC: 59.6 U/ML
CEA SERPL-MCNC: 19.2 NG/ML
CHLORIDE SERPL-SCNC: 106 MMOL/L
CO2 SERPL-SCNC: 23 MMOL/L
CREAT SERPL-MCNC: 1.01 MG/DL
EGFR: 65 ML/MIN/1.73M2
GLUCOSE SERPL-MCNC: 91 MG/DL
POTASSIUM SERPL-SCNC: 4.7 MMOL/L
PROT SERPL-MCNC: 6.8 G/DL
SODIUM SERPL-SCNC: 142 MMOL/L

## 2024-06-18 ENCOUNTER — NON-APPOINTMENT (OUTPATIENT)
Age: 59
End: 2024-06-18

## 2024-06-18 ENCOUNTER — APPOINTMENT (OUTPATIENT)
Dept: CARDIOLOGY | Facility: CLINIC | Age: 59
End: 2024-06-18
Payer: MEDICARE

## 2024-06-18 VITALS
DIASTOLIC BLOOD PRESSURE: 80 MMHG | BODY MASS INDEX: 36.1 KG/M2 | OXYGEN SATURATION: 97 % | HEIGHT: 67 IN | SYSTOLIC BLOOD PRESSURE: 116 MMHG | HEART RATE: 98 BPM | WEIGHT: 230 LBS

## 2024-06-18 DIAGNOSIS — R07.89 OTHER CHEST PAIN: ICD-10-CM

## 2024-06-18 DIAGNOSIS — R00.2 PALPITATIONS: ICD-10-CM

## 2024-06-18 DIAGNOSIS — Z82.49 FAMILY HISTORY OF ISCHEMIC HEART DISEASE AND OTHER DISEASES OF THE CIRCULATORY SYSTEM: ICD-10-CM

## 2024-06-18 PROCEDURE — 99204 OFFICE O/P NEW MOD 45 MIN: CPT | Mod: 25

## 2024-06-18 PROCEDURE — 93000 ELECTROCARDIOGRAM COMPLETE: CPT

## 2024-06-18 RX ORDER — COVID-19 ANTIGEN TEST
KIT MISCELLANEOUS
Qty: 2 | Refills: 0 | Status: DISCONTINUED | COMMUNITY
Start: 2022-11-05 | End: 2024-06-18

## 2024-06-18 RX ORDER — LORAZEPAM 1 MG/1
1 TABLET ORAL
Qty: 30 | Refills: 0 | Status: DISCONTINUED | COMMUNITY
Start: 2023-11-14 | End: 2024-06-18

## 2024-06-18 RX ORDER — DEXAMETHASONE 0.5 MG/5ML
0.5 SOLUTION ORAL
Qty: 1 | Refills: 3 | Status: DISCONTINUED | COMMUNITY
Start: 2019-03-19 | End: 2024-06-18

## 2024-06-18 RX ORDER — BIOTIN 10 MG
TABLET ORAL
Refills: 0 | Status: DISCONTINUED | COMMUNITY
End: 2024-06-18

## 2024-06-18 NOTE — DISCUSSION/SUMMARY
[EKG obtained to assist in diagnosis and management of assessed problem(s)] : EKG obtained to assist in diagnosis and management of assessed problem(s) [FreeTextEntry1] : Pt is a 58 y/o F PMH breast Ca dx at age 52 with osseous mets s/p oophorectomy now on ibrance, letrozole She p/w palpitations ?SVT from description Given pt's symptoms will check high monitor to assess for ectopy.  Advised adequate hydration.  Drug use, OTC medication use, caffeine consumption reviewed  Will check transthoracic echocardiogram to evaluate left ventricular function and assess for any structural abnormalities  will perform ETT to assess patient's current cardiac reserve to incremental activity and check for provocable ECG changes.  check TFTS, recent labs reviewed Advised to stay well hydrated, avoid caffeine VSS Advised pt to go to the nearest ED if symptoms persist or worsen   Most recent available lab results were reviewed with pt. The described plan was discussed with the pt.  All questions and concerns were addressed to the best of my knowledge.

## 2024-06-18 NOTE — REVIEW OF SYSTEMS
[Negative] : Heme/Lymph [SOB] : no shortness of breath [Dyspnea on exertion] : not dyspnea during exertion [Chest Discomfort] : no chest discomfort [Lower Ext Edema] : no extremity edema [Leg Claudication] : no intermittent leg claudication [Palpitations] : palpitations [Orthopnea] : no orthopnea [PND] : no PND [Syncope] : no syncope

## 2024-06-18 NOTE — HISTORY OF PRESENT ILLNESS
[FreeTextEntry1] : Pt is a 58 y/o F who is referred here today by their PCP for evaluation.  Pt has PMH breast Ca dx at age 52 with osseous mets s/p oophorectomy now on ibrance, letrozole She has been having episodes palpitations.  First one was 2 months ago - felt heart racing for about 15min, woke her from sleep.  She had second episode last month which was about 10min.  She felt a little lightheaded and chest pressure.  She denies SOB, diaphoresis, palpitations, syncope, LE edema, PND, orthopnea.   TTE 03/2021 EF 60-65%, mild PI   additional PMH: BMI 36 Previous hospitalizations: Previous surgeries: oophrectomy, nyla - no problems with anesthesia Family hx: no SCD, no cardiac  Smoking status: never social ETOH no drug use Current exercise: walking 5x/week 2-3 miles  Daily water intake: "a lot" Daily caffeine intake: 2 cups coffee OTC medications: MVI 3 children - no problem with pregnancies

## 2024-07-02 DIAGNOSIS — Z91.89 OTHER SPECIFIED PERSONAL RISK FACTORS, NOT ELSEWHERE CLASSIFIED: ICD-10-CM

## 2024-07-02 RX ORDER — DOXYCYCLINE HYCLATE 100 MG/1
100 CAPSULE ORAL
Qty: 2 | Refills: 1 | Status: ACTIVE | COMMUNITY
Start: 2024-07-02 | End: 1900-01-01

## 2024-07-08 ENCOUNTER — RESULT REVIEW (OUTPATIENT)
Age: 59
End: 2024-07-08

## 2024-07-08 ENCOUNTER — APPOINTMENT (OUTPATIENT)
Dept: NUCLEAR MEDICINE | Facility: IMAGING CENTER | Age: 59
End: 2024-07-08
Payer: MEDICARE

## 2024-07-08 ENCOUNTER — APPOINTMENT (OUTPATIENT)
Dept: CT IMAGING | Facility: IMAGING CENTER | Age: 59
End: 2024-07-08
Payer: MEDICARE

## 2024-07-08 ENCOUNTER — OUTPATIENT (OUTPATIENT)
Dept: OUTPATIENT SERVICES | Facility: HOSPITAL | Age: 59
LOS: 1 days | End: 2024-07-08
Payer: MEDICARE

## 2024-07-08 DIAGNOSIS — C50.911 MALIGNANT NEOPLASM OF UNSPECIFIED SITE OF RIGHT FEMALE BREAST: ICD-10-CM

## 2024-07-08 DIAGNOSIS — Z98.891 HISTORY OF UTERINE SCAR FROM PREVIOUS SURGERY: Chronic | ICD-10-CM

## 2024-07-08 DIAGNOSIS — Z90.49 ACQUIRED ABSENCE OF OTHER SPECIFIED PARTS OF DIGESTIVE TRACT: Chronic | ICD-10-CM

## 2024-07-08 PROCEDURE — A9561: CPT

## 2024-07-08 PROCEDURE — 71260 CT THORAX DX C+: CPT | Mod: 26,MH

## 2024-07-08 PROCEDURE — 74177 CT ABD & PELVIS W/CONTRAST: CPT | Mod: 26,MH

## 2024-07-08 PROCEDURE — 74177 CT ABD & PELVIS W/CONTRAST: CPT

## 2024-07-08 PROCEDURE — 78306 BONE IMAGING WHOLE BODY: CPT | Mod: 26,MH

## 2024-07-08 PROCEDURE — 71260 CT THORAX DX C+: CPT

## 2024-07-08 PROCEDURE — 78306 BONE IMAGING WHOLE BODY: CPT

## 2024-07-09 ENCOUNTER — APPOINTMENT (OUTPATIENT)
Dept: CARDIOLOGY | Facility: CLINIC | Age: 59
End: 2024-07-09

## 2024-07-09 PROCEDURE — 93246 EXT ECG>7D<15D RECORDING: CPT

## 2024-07-10 RX ORDER — ELACESTRANT 345 MG/1
345 TABLET, FILM COATED ORAL
Qty: 30 | Refills: 2 | Status: ACTIVE | COMMUNITY
Start: 2024-07-10 | End: 1900-01-01

## 2024-07-12 ENCOUNTER — OUTPATIENT (OUTPATIENT)
Dept: OUTPATIENT SERVICES | Facility: HOSPITAL | Age: 59
LOS: 1 days | Discharge: HOME | End: 2024-07-12

## 2024-07-12 DIAGNOSIS — C79.51 SECONDARY MALIGNANT NEOPLASM OF BONE: ICD-10-CM

## 2024-07-12 DIAGNOSIS — C50.911 MALIGNANT NEOPLASM OF UNSPECIFIED SITE OF RIGHT FEMALE BREAST: ICD-10-CM

## 2024-07-12 DIAGNOSIS — Z98.891 HISTORY OF UTERINE SCAR FROM PREVIOUS SURGERY: Chronic | ICD-10-CM

## 2024-07-12 DIAGNOSIS — Z90.49 ACQUIRED ABSENCE OF OTHER SPECIFIED PARTS OF DIGESTIVE TRACT: Chronic | ICD-10-CM

## 2024-07-16 ENCOUNTER — RESULT REVIEW (OUTPATIENT)
Age: 59
End: 2024-07-16

## 2024-07-16 ENCOUNTER — APPOINTMENT (OUTPATIENT)
Dept: HEMATOLOGY ONCOLOGY | Facility: CLINIC | Age: 59
End: 2024-07-16
Payer: MEDICARE

## 2024-07-16 VITALS
TEMPERATURE: 98.9 F | BODY MASS INDEX: 36.6 KG/M2 | HEART RATE: 115 BPM | WEIGHT: 233.69 LBS | RESPIRATION RATE: 20 BRPM | DIASTOLIC BLOOD PRESSURE: 92 MMHG | SYSTOLIC BLOOD PRESSURE: 132 MMHG | OXYGEN SATURATION: 95 %

## 2024-07-16 DIAGNOSIS — M25.50 PAIN IN UNSPECIFIED JOINT: ICD-10-CM

## 2024-07-16 DIAGNOSIS — C50.911 MALIGNANT NEOPLASM OF UNSPECIFIED SITE OF RIGHT FEMALE BREAST: ICD-10-CM

## 2024-07-16 DIAGNOSIS — D70.2 OTHER DRUG-INDUCED AGRANULOCYTOSIS: ICD-10-CM

## 2024-07-16 DIAGNOSIS — C79.51 MALIGNANT NEOPLASM OF UNSPECIFIED SITE OF RIGHT FEMALE BREAST: ICD-10-CM

## 2024-07-16 DIAGNOSIS — R23.2 FLUSHING: ICD-10-CM

## 2024-07-16 DIAGNOSIS — T45.1X5A FLUSHING: ICD-10-CM

## 2024-07-16 LAB
BASOPHILS # BLD AUTO: 0.06 K/UL — SIGNIFICANT CHANGE UP (ref 0–0.2)
BASOPHILS NFR BLD AUTO: 2.4 % — HIGH (ref 0–2)
EOSINOPHIL # BLD AUTO: 0.04 K/UL — SIGNIFICANT CHANGE UP (ref 0–0.5)
EOSINOPHIL NFR BLD AUTO: 1.6 % — SIGNIFICANT CHANGE UP (ref 0–6)
HCT VFR BLD CALC: 39.1 % — SIGNIFICANT CHANGE UP (ref 34.5–45)
HGB BLD-MCNC: 13.9 G/DL — SIGNIFICANT CHANGE UP (ref 11.5–15.5)
IMM GRANULOCYTES NFR BLD AUTO: 0.4 % — SIGNIFICANT CHANGE UP (ref 0–0.9)
LYMPHOCYTES # BLD AUTO: 0.71 K/UL — LOW (ref 1–3.3)
LYMPHOCYTES # BLD AUTO: 29 % — SIGNIFICANT CHANGE UP (ref 13–44)
MCHC RBC-ENTMCNC: 35.5 G/DL — SIGNIFICANT CHANGE UP (ref 32–36)
MCHC RBC-ENTMCNC: 36.2 PG — HIGH (ref 27–34)
MCV RBC AUTO: 101.8 FL — HIGH (ref 80–100)
MONOCYTES # BLD AUTO: 0.44 K/UL — SIGNIFICANT CHANGE UP (ref 0–0.9)
MONOCYTES NFR BLD AUTO: 18 % — HIGH (ref 2–14)
NEUTROPHILS # BLD AUTO: 1.19 K/UL — LOW (ref 1.8–7.4)
NEUTROPHILS NFR BLD AUTO: 48.6 % — SIGNIFICANT CHANGE UP (ref 43–77)
NRBC # BLD: 0 /100 WBCS — SIGNIFICANT CHANGE UP (ref 0–0)
PLATELET # BLD AUTO: 122 K/UL — LOW (ref 150–400)
RBC # BLD: 3.84 M/UL — SIGNIFICANT CHANGE UP (ref 3.8–5.2)
RBC # FLD: 12.6 % — SIGNIFICANT CHANGE UP (ref 10.3–14.5)
WBC # BLD: 2.45 K/UL — LOW (ref 3.8–10.5)
WBC # FLD AUTO: 2.45 K/UL — LOW (ref 3.8–10.5)

## 2024-07-16 PROCEDURE — 99215 OFFICE O/P EST HI 40 MIN: CPT

## 2024-07-16 PROCEDURE — G2211 COMPLEX E/M VISIT ADD ON: CPT

## 2024-07-16 RX ORDER — METOCLOPRAMIDE 10 MG/1
10 TABLET ORAL 4 TIMES DAILY
Qty: 30 | Refills: 2 | Status: ACTIVE | COMMUNITY
Start: 2024-07-16 | End: 1900-01-01

## 2024-07-17 ENCOUNTER — APPOINTMENT (OUTPATIENT)
Dept: MRI IMAGING | Facility: CLINIC | Age: 59
End: 2024-07-17

## 2024-07-17 PROCEDURE — 74183 MRI ABD W/O CNTR FLWD CNTR: CPT | Mod: 26,76

## 2024-07-17 PROCEDURE — A9585: CPT | Mod: JZ

## 2024-07-18 LAB
ALBUMIN SERPL ELPH-MCNC: 4.6 G/DL
ALP BLD-CCNC: 69 U/L
ALT SERPL-CCNC: 77 U/L
ANION GAP SERPL CALC-SCNC: 14 MMOL/L
AST SERPL-CCNC: 51 U/L
BILIRUB SERPL-MCNC: 0.3 MG/DL
BUN SERPL-MCNC: 24 MG/DL
CALCIUM SERPL-MCNC: 9.3 MG/DL
CANCER AG27-29 SERPL-ACNC: 79.7 U/ML
CEA SERPL-MCNC: 25.6 NG/ML
CHLORIDE SERPL-SCNC: 104 MMOL/L
CHOLEST SERPL-MCNC: 204 MG/DL
CO2 SERPL-SCNC: 23 MMOL/L
CREAT SERPL-MCNC: 0.94 MG/DL
EGFR: 70 ML/MIN/1.73M2
GLUCOSE SERPL-MCNC: 96 MG/DL
HDLC SERPL-MCNC: 86 MG/DL
INR PPP: 1.1 RATIO
LDLC SERPL CALC-MCNC: 101 MG/DL
NONHDLC SERPL-MCNC: 118 MG/DL
POTASSIUM SERPL-SCNC: 4.3 MMOL/L
PROT SERPL-MCNC: 7.2 G/DL
PT BLD: 12.4 SEC
SODIUM SERPL-SCNC: 141 MMOL/L
TRIGL SERPL-MCNC: 101 MG/DL

## 2024-07-29 ENCOUNTER — TRANSCRIPTION ENCOUNTER (OUTPATIENT)
Age: 59
End: 2024-07-29

## 2024-07-30 ENCOUNTER — RESULT REVIEW (OUTPATIENT)
Age: 59
End: 2024-07-30

## 2024-07-30 ENCOUNTER — APPOINTMENT (OUTPATIENT)
Dept: ULTRASOUND IMAGING | Facility: IMAGING CENTER | Age: 59
End: 2024-07-30
Payer: MEDICARE

## 2024-07-30 ENCOUNTER — OUTPATIENT (OUTPATIENT)
Dept: OUTPATIENT SERVICES | Facility: HOSPITAL | Age: 59
LOS: 1 days | End: 2024-07-30
Payer: MEDICARE

## 2024-07-30 DIAGNOSIS — Z90.49 ACQUIRED ABSENCE OF OTHER SPECIFIED PARTS OF DIGESTIVE TRACT: Chronic | ICD-10-CM

## 2024-07-30 DIAGNOSIS — Z98.891 HISTORY OF UTERINE SCAR FROM PREVIOUS SURGERY: Chronic | ICD-10-CM

## 2024-07-30 DIAGNOSIS — C50.911 MALIGNANT NEOPLASM OF UNSPECIFIED SITE OF RIGHT FEMALE BREAST: ICD-10-CM

## 2024-07-30 PROCEDURE — 88307 TISSUE EXAM BY PATHOLOGIST: CPT

## 2024-07-30 PROCEDURE — 76942 ECHO GUIDE FOR BIOPSY: CPT | Mod: 26

## 2024-07-30 PROCEDURE — 88307 TISSUE EXAM BY PATHOLOGIST: CPT | Mod: 26

## 2024-07-30 PROCEDURE — 76942 ECHO GUIDE FOR BIOPSY: CPT

## 2024-07-30 PROCEDURE — 88341 IMHCHEM/IMCYTCHM EA ADD ANTB: CPT | Mod: 26

## 2024-07-30 PROCEDURE — 88342 IMHCHEM/IMCYTCHM 1ST ANTB: CPT

## 2024-07-30 PROCEDURE — 47000 NEEDLE BIOPSY OF LIVER PERQ: CPT

## 2024-07-30 PROCEDURE — 88342 IMHCHEM/IMCYTCHM 1ST ANTB: CPT | Mod: 26

## 2024-07-30 PROCEDURE — 88341 IMHCHEM/IMCYTCHM EA ADD ANTB: CPT

## 2024-08-02 LAB — SURGICAL PATHOLOGY STUDY: SIGNIFICANT CHANGE UP

## 2024-08-05 ENCOUNTER — APPOINTMENT (OUTPATIENT)
Dept: CARDIOLOGY | Facility: CLINIC | Age: 59
End: 2024-08-05

## 2024-08-05 PROCEDURE — 93306 TTE W/DOPPLER COMPLETE: CPT

## 2024-08-06 ENCOUNTER — TRANSCRIPTION ENCOUNTER (OUTPATIENT)
Age: 59
End: 2024-08-06

## 2024-08-13 ENCOUNTER — APPOINTMENT (OUTPATIENT)
Dept: CARDIOLOGY | Facility: CLINIC | Age: 59
End: 2024-08-13
Payer: MEDICARE

## 2024-08-13 PROCEDURE — 93015 CV STRESS TEST SUPVJ I&R: CPT

## 2024-08-20 ENCOUNTER — LABORATORY RESULT (OUTPATIENT)
Age: 59
End: 2024-08-20

## 2024-08-26 LAB
ALBUMIN SERPL ELPH-MCNC: 4.2 G/DL
ALP BLD-CCNC: 64 U/L
ALT SERPL-CCNC: 35 U/L
ANION GAP SERPL CALC-SCNC: 14 MMOL/L
AST SERPL-CCNC: 28 U/L
BASOPHILS # BLD AUTO: 0.04 K/UL
BASOPHILS NFR BLD AUTO: 0.9 %
BILIRUB SERPL-MCNC: 0.3 MG/DL
BUN SERPL-MCNC: 26 MG/DL
CALCIUM SERPL-MCNC: 9.6 MG/DL
CANCER AG27-29 SERPL-ACNC: 84.2 U/ML
CEA SERPL-MCNC: 17 NG/ML
CHLORIDE SERPL-SCNC: 106 MMOL/L
CO2 SERPL-SCNC: 23 MMOL/L
CREAT SERPL-MCNC: 1.16 MG/DL
EGFR: 55 ML/MIN/1.73M2
EOSINOPHIL # BLD AUTO: 0.11 K/UL
EOSINOPHIL NFR BLD AUTO: 2.6 %
GLUCOSE SERPL-MCNC: 115 MG/DL
HCT VFR BLD CALC: 42.2 %
HGB BLD-MCNC: 13.4 G/DL
LYMPHOCYTES # BLD AUTO: 0.89 K/UL
LYMPHOCYTES NFR BLD AUTO: 20.9 %
MAN DIFF?: NORMAL
MCHC RBC-ENTMCNC: 31.8 GM/DL
MCHC RBC-ENTMCNC: 33.9 PG
MCV RBC AUTO: 106.8 FL
MONOCYTES # BLD AUTO: 0.15 K/UL
MONOCYTES NFR BLD AUTO: 3.5 %
NEUTROPHILS # BLD AUTO: 2.97 K/UL
NEUTROPHILS NFR BLD AUTO: 69.5 %
PLATELET # BLD AUTO: 150 K/UL
POTASSIUM SERPL-SCNC: 4.3 MMOL/L
PROT SERPL-MCNC: 6.5 G/DL
RBC # BLD: 3.95 M/UL
RBC # FLD: 12.5 %
SODIUM SERPL-SCNC: 143 MMOL/L
WBC # FLD AUTO: 4.28 K/UL

## 2024-09-09 NOTE — OB HISTORY
[Definite:  ___ (Date)] : the last menstrual period was [unfilled] [Menarche Age: ____] : age at menarche was [unfilled] [Menopause Age: ____] : age at menopause was [unfilled] [___] : Living: [unfilled]

## 2024-09-10 ENCOUNTER — RESULT REVIEW (OUTPATIENT)
Age: 59
End: 2024-09-10

## 2024-09-10 ENCOUNTER — APPOINTMENT (OUTPATIENT)
Dept: HEMATOLOGY ONCOLOGY | Facility: CLINIC | Age: 59
End: 2024-09-10
Payer: MEDICARE

## 2024-09-10 ENCOUNTER — APPOINTMENT (OUTPATIENT)
Dept: HEMATOLOGY ONCOLOGY | Facility: CLINIC | Age: 59
End: 2024-09-10

## 2024-09-10 VITALS
OXYGEN SATURATION: 96 % | RESPIRATION RATE: 19 BRPM | HEART RATE: 105 BPM | TEMPERATURE: 99.4 F | BODY MASS INDEX: 35.91 KG/M2 | WEIGHT: 229.28 LBS | SYSTOLIC BLOOD PRESSURE: 119 MMHG | DIASTOLIC BLOOD PRESSURE: 86 MMHG

## 2024-09-10 DIAGNOSIS — K52.1 TOXIC GASTROENTERITIS AND COLITIS: ICD-10-CM

## 2024-09-10 DIAGNOSIS — C50.911 MALIGNANT NEOPLASM OF UNSPECIFIED SITE OF RIGHT FEMALE BREAST: ICD-10-CM

## 2024-09-10 DIAGNOSIS — M25.50 PAIN IN UNSPECIFIED JOINT: ICD-10-CM

## 2024-09-10 DIAGNOSIS — C79.51 MALIGNANT NEOPLASM OF UNSPECIFIED SITE OF RIGHT FEMALE BREAST: ICD-10-CM

## 2024-09-10 LAB
BASOPHILS # BLD AUTO: 0.04 K/UL — SIGNIFICANT CHANGE UP (ref 0–0.2)
BASOPHILS NFR BLD AUTO: 1.4 % — SIGNIFICANT CHANGE UP (ref 0–2)
EOSINOPHIL # BLD AUTO: 0.06 K/UL — SIGNIFICANT CHANGE UP (ref 0–0.5)
EOSINOPHIL NFR BLD AUTO: 2.1 % — SIGNIFICANT CHANGE UP (ref 0–6)
HCT VFR BLD CALC: 40.8 % — SIGNIFICANT CHANGE UP (ref 34.5–45)
HGB BLD-MCNC: 13.2 G/DL — SIGNIFICANT CHANGE UP (ref 11.5–15.5)
IMM GRANULOCYTES NFR BLD AUTO: 0 % — SIGNIFICANT CHANGE UP (ref 0–0.9)
LYMPHOCYTES # BLD AUTO: 0.82 K/UL — LOW (ref 1–3.3)
LYMPHOCYTES # BLD AUTO: 28.9 % — SIGNIFICANT CHANGE UP (ref 13–44)
MCHC RBC-ENTMCNC: 32.4 G/DL — SIGNIFICANT CHANGE UP (ref 32–36)
MCHC RBC-ENTMCNC: 33.4 PG — SIGNIFICANT CHANGE UP (ref 27–34)
MCV RBC AUTO: 103.3 FL — HIGH (ref 80–100)
MONOCYTES # BLD AUTO: 0.21 K/UL — SIGNIFICANT CHANGE UP (ref 0–0.9)
MONOCYTES NFR BLD AUTO: 7.4 % — SIGNIFICANT CHANGE UP (ref 2–14)
NEUTROPHILS # BLD AUTO: 1.71 K/UL — LOW (ref 1.8–7.4)
NEUTROPHILS NFR BLD AUTO: 60.2 % — SIGNIFICANT CHANGE UP (ref 43–77)
NRBC # BLD: 0 /100 WBCS — SIGNIFICANT CHANGE UP (ref 0–0)
PLATELET # BLD AUTO: 163 K/UL — SIGNIFICANT CHANGE UP (ref 150–400)
RBC # BLD: 3.95 M/UL — SIGNIFICANT CHANGE UP (ref 3.8–5.2)
RBC # FLD: 12.7 % — SIGNIFICANT CHANGE UP (ref 10.3–14.5)
WBC # BLD: 2.84 K/UL — LOW (ref 3.8–10.5)
WBC # FLD AUTO: 2.84 K/UL — LOW (ref 3.8–10.5)

## 2024-09-10 PROCEDURE — 99214 OFFICE O/P EST MOD 30 MIN: CPT

## 2024-09-10 PROCEDURE — G2211 COMPLEX E/M VISIT ADD ON: CPT

## 2024-09-11 PROBLEM — K52.1 DRUG-INDUCED DIARRHEA: Status: ACTIVE | Noted: 2024-09-11

## 2024-09-11 LAB — CANCER AG27-29 SERPL-ACNC: 76.8 U/ML

## 2024-09-11 NOTE — HISTORY OF PRESENT ILLNESS
[Disease: _____________________] : Disease: [unfilled] [T: ___] : T[unfilled] [N: ___] : N[unfilled] [M: ___] : M[unfilled] [AJCC Stage: ____] : AJCC Stage: [unfilled] [de-identified] : Right breast cancer diagnosed at the age of 52. 11/14 Presented with right breast mastitis and treated with antibiotics. Multiple recurrence of mastitis over next two years, 3-4 per year with increased frequency in 2016-17. 6/13/16 She had a 3 mm punch biopsy of the right breast which revealed interstitial neutrophilic infiltrate. 04/25/17 Bilateral breast mammogram showed no significant interval change in the appearance of the diffuse breast edema and diffuse skin thickening in the right breast with no specific evidence of adenopathy or underlying abnormality. New skin thickening and increase parenchymal density in the left LIQ. Finding are indeterminate and biopsy was suggested. Bilateral breast ultrasound showed diffuse right sided skin thickening with focal left medial new skin thickening. 06/02/17 Diagnostic mammogram revealed signs of breast edema with skin thickening, progressive since prior studies. D/DX cellulitis vs. mastitis vs. inflammatory breast cancer. 06/03/17 Bilateral breast ultrasound again showed nonspecific and recurrent signs of inflammation in the right breast, consisting of skin thickening and breast edema. Patient reported having pain, erythema, fever and shaking chills, slightly better with antibiotics. 06/22/17 Right central breast, sonogram guided core biopsy showed invasive ductal carcinoma, SBR 6/9, ER 95%, PA 30% and HER 2 negative and DCIS solid pattern with intermediate grade nuclear atypia. 06/26/17 MRI of the breast showed the recently diagnosed right breast malignancy, with diffuse skin thickening and asymmetric parenchymal enhancement. A 5 mm enhancing nodule in the right upper outer breast, possibly representing intramammary lymph node. No suspicious findings in the left breast. 06/27/17 CT scan of C/A/P showed an irregular right sided breast thickening compatible with biopsy proven right breast cancer. Osseous metastases involving the T10, T11 and L4 vertebral bodies. Indeterminate 2 cm left adrenal nodule. 06/27/17 Bone scan showed abnormal bone scan compatible with multifocal osseous metastases in the thoracolumbar spine, left iliac bone and in the right acetabulum. 7/14/17 BSO  7/21/17 - 7/2024 Letrozole 2.5 mg daily and Ibrance 125 mg days 1-21 every 28 days. Discontinued after 7 years due to POD in liver. 7/2017 Xgeva started every 3 months and then decreased to every 6 months in 2018 due to good cancer control and to lower risk of ONJ. 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  7/18/24 Orserdu 345 mg po daily. 8/15/24 Verzenio 150 mg po bid  [de-identified] : Invasive ductal carcinoma, ER 95%, NV 30% and HER 2 negative [de-identified] : Nasrin started letrozole and Ibrance in 7/17 and has done very well aside from arthralgias but recent CT scan showed POD in the liver. She started Orserdu in 7/24 and then Verzenio in 8/24. These scripts must go to [id number]. She is tolerating the Orserdu well but has been having significant diarrhea from the Verzenio. Encouraged her to take Imodium as needed and not hold off. Foot and ankle pain much better since stopping letrozole. She has had two recent cardiac episodes that each lasted about 5 minutes where she felt her heart pounding with associated chest pressure, but no radiating pain. She felt dizzy when she stood up. This happened once at night and woke her up and the second time during the day when she was sitting. She recently saw a local cardiologist who is evaluating her with a Holter monitor, echo and stress test. Her older daughters (twins) graduated in 2023 from [employer] in speech pathology (Janiya) and hospital administration/ (Ivonne). Her younger daughter (Cassie) completed her masters degree in 5/23 at [employer] in business management. She was unsuccessful in her bid to join the [employer] team for the Olympics. One of her 26 year-old twins (Janiya) was diagnosed with a large ovarian mass in 9/23. She has a history of polycystic ovary syndrome. She had a USO at [facility] which showed well differentiated Stage IA ovarian cancer, so no chemo was recommended.  7/17/24 MRI of the abdomen showed mild diffuse hepatic steatosis. Diffuse innumerable widespread hepatic metastases ranging in size from the majority of which are subcentimeter up to 2.2 cm within segment 8. There is additionally a peripherally located 1.8 cm metastasis within segment 4A. Increased bone mets. 7/8/24 CT scans showed new findings identified in the liver including confluent small hypoechoic nodules suspicious for progression of disease. Additionally, there appear to be areas of low attenuation in segment 4 and segment 7 which may represent developing disease. MRI may further help to assess what this represents underlying metastatic disease in these areas. Distribution and appearance of osseous metastasis is unchanged 3/4/24 CT scans showed unchanged bony metastases. No lymphadenopathy. Stable left adrenal nodule. 11/6/23 CT scans showed stable bone metastases and a stable left adrenal nodule. 07/10/23 CT scans showed stable sclerotic bony metastases. No evidence of new or increasing metastatic disease. 07/10/23 Bone scan showed Metastatic disease not significantly changed compared to the previous bone scan of 7/14/2022. No new osseous lesions. 03/09/23 CT scans showed extensive asymmetric right breast thickening again noted. A 2.3 cm left adrenal mass is without signal change dating back to 7/16/2019. Bilateral renal cysts, largest 11 cm in the right kidney. 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 No hydronephrosis. Multiple sclerotic metastases appear unchanged. Mild degenerative changes in the spine. No significant interval change since prior. No new findings. 11/9/22 CT scans showed stable sclerotic bony metastases and no evidence of new or increasing metastatic disease. 7/14/22 CT scans showed stable sclerotic bony metastases and no new metastatic disease 7/5/22 Bone scan showed stable disease and no new lesions 3/14/22 CT scan C/A/P showed unchanged extent of osteoblastic osseous metastases and no new metastatic disease. 1/31/22 Breast MRI showed diffuse mild right breast skin thickening and parenchymal edema with no foal enhancing mass. No suspicious enhancement in the left breast. No significant axillary or internal mammary lymphadenopathy. 11/2/21 CT scan C/A/P showed stable sclerotic osseous metastatic lesions without significant change. No evidence of any new cancer lesions on scan. 07/09/21 Bone scan shows no new osseous lesions and no significant change in existing lesions. 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 07/09/21 CT scan C/A/P shows stable osseous lesions and no new findings 03/02/21 CT scan C/A/P showed grossly stable bone metastases. No other evidence for metastatic disease. Stable skin thickening of the right breast. 10/29/20 CT scan C/A/P showed multiple blastic bone metastases demonstrating no change since the prior exam. No other evidence of metastatic disease. 07/10/20 Bone scan showed osseous metastatic disease not significantly changed. Degenerative disease in the major joints. No new osseous lesions. 07/10/20 CT scan showed Stable exam. Sclerotic osseous metastases unchanged 3/11/20 CT scan showed Osseous metastatic disease, unchanged. No new foci of metastatic disease. 7/16/19 CT Scan showed stable examination. Sclerotic osseous metastatic disease without significant change. 7/16/19 Bone scan showed no significant interval change compared to the previous bone scan of 7/11/18, showing resolved or less prominent metastatic lesions from 6/27/17. No new osseous lesions are identified. 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  CT scan from 3/15/19 showed stable 2 cm left adrenal adenoma, small likely angiolipoma upper pole left kidney and right renal cyst unchanged. Stable radiographic distribution of sclerotic metastases.  CT scan from 11/2/18: Stable with no new findings CT scan from 7/11/18: Stable with no new findings Bone scan 7/11/18 stable to improved with no new areas of disease CT scan from 4/9/18: Stable osseous metastases and no new findings. CT scan from 1/16/18: Stable osseous metastases. No radiographic evidence of visceral metastasis with a stable 2 cm indeterminate left adrenal nodule CT scan from 11/11/2019 Stable osseous metastases, no new areas of disease

## 2024-09-11 NOTE — PHYSICAL EXAM
[Fully active, able to carry on all pre-disease performance without restriction] : Status 0 - Fully active, able to carry on all pre-disease performance without restriction [Normal] : affect appropriate [de-identified] : Right breast with increasingly normal tissue, central mass no longer clearly palpable

## 2024-09-11 NOTE — PHYSICAL EXAM
[Fully active, able to carry on all pre-disease performance without restriction] : Status 0 - Fully active, able to carry on all pre-disease performance without restriction [Normal] : affect appropriate [de-identified] : Right breast with increasingly normal tissue, central mass no longer clearly palpable

## 2024-09-11 NOTE — HISTORY OF PRESENT ILLNESS
[Disease: _____________________] : Disease: [unfilled] [T: ___] : T[unfilled] [N: ___] : N[unfilled] [M: ___] : M[unfilled] [AJCC Stage: ____] : AJCC Stage: [unfilled] [de-identified] : Right breast cancer diagnosed at the age of 52. 11/14 Presented with right breast mastitis and treated with antibiotics. Multiple recurrence of mastitis over next two years, 3-4 per year with increased frequency in 2016-17. 6/13/16 She had a 3 mm punch biopsy of the right breast which revealed interstitial neutrophilic infiltrate. 04/25/17 Bilateral breast mammogram showed no significant interval change in the appearance of the diffuse breast edema and diffuse skin thickening in the right breast with no specific evidence of adenopathy or underlying abnormality. New skin thickening and increase parenchymal density in the left LIQ. Finding are indeterminate and biopsy was suggested. Bilateral breast ultrasound showed diffuse right sided skin thickening with focal left medial new skin thickening. 06/02/17 Diagnostic mammogram revealed signs of breast edema with skin thickening, progressive since prior studies. D/DX cellulitis vs. mastitis vs. inflammatory breast cancer. 06/03/17 Bilateral breast ultrasound again showed nonspecific and recurrent signs of inflammation in the right breast, consisting of skin thickening and breast edema. Patient reported having pain, erythema, fever and shaking chills, slightly better with antibiotics. 06/22/17 Right central breast, sonogram guided core biopsy showed invasive ductal carcinoma, SBR 6/9, ER 95%, ID 30% and HER 2 negative and DCIS solid pattern with intermediate grade nuclear atypia. 06/26/17 MRI of the breast showed the recently diagnosed right breast malignancy, with diffuse skin thickening and asymmetric parenchymal enhancement. A 5 mm enhancing nodule in the right upper outer breast, possibly representing intramammary lymph node. No suspicious findings in the left breast. 06/27/17 CT scan of C/A/P showed an irregular right sided breast thickening compatible with biopsy proven right breast cancer. Osseous metastases involving the T10, T11 and L4 vertebral bodies. Indeterminate 2 cm left adrenal nodule. 06/27/17 Bone scan showed abnormal bone scan compatible with multifocal osseous metastases in the thoracolumbar spine, left iliac bone and in the right acetabulum. 7/14/17 BSO  7/21/17 - 7/2024 Letrozole 2.5 mg daily and Ibrance 125 mg days 1-21 every 28 days. Discontinued after 7 years due to POD in liver. 7/2017 Xgeva started every 3 months and then decreased to every 6 months in 2018 due to good cancer control and to lower risk of ONJ. 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  7/18/24 Orserdu 345 mg po daily. 8/15/24 Verzenio 150 mg po bid  [de-identified] : Invasive ductal carcinoma, ER 95%, DC 30% and HER 2 negative [de-identified] : Nasrin started letrozole and Ibrance in 7/17 and has done very well aside from arthralgias but recent CT scan showed POD in the liver. She started Orserdu in 7/24 and then Verzenio in 8/24. These scripts must go to [id number]. She is tolerating the Orserdu well but has been having significant diarrhea from the Verzenio. Encouraged her to take Imodium as needed and not hold off. Foot and ankle pain much better since stopping letrozole. She has had two recent cardiac episodes that each lasted about 5 minutes where she felt her heart pounding with associated chest pressure, but no radiating pain. She felt dizzy when she stood up. This happened once at night and woke her up and the second time during the day when she was sitting. She recently saw a local cardiologist who is evaluating her with a Holter monitor, echo and stress test. Her older daughters (twins) graduated in 2023 from [employer] in speech pathology (Janiya) and hospital administration/ (Ivonne). Her younger daughter (Cassie) completed her masters degree in 5/23 at [employer] in business management. She was unsuccessful in her bid to join the [employer] track team for the Olympics. One of her 26 year-old twins (Janiya) was diagnosed with a large ovarian mass in 9/23. She has a history of polycystic ovary syndrome. She had a USO at [facility] which showed well differentiated Stage IA ovarian cancer, so no chemo was recommended.  7/17/24 MRI of the abdomen showed mild diffuse hepatic steatosis. Diffuse innumerable widespread hepatic metastases ranging in size from the majority of which are subcentimeter up to 2.2 cm within segment 8. There is additionally a peripherally located 1.8 cm metastasis within segment 4A. Increased bone mets. 7/8/24 CT scans showed new findings identified in the liver including confluent small hypoechoic nodules suspicious for progression of disease. Additionally, there appear to be areas of low attenuation in segment 4 and segment 7 which may represent developing disease. MRI may further help to assess what this represents underlying metastatic disease in these areas. Distribution and appearance of osseous metastasis is unchanged 3/4/24 CT scans showed unchanged bony metastases. No lymphadenopathy. Stable left adrenal nodule. 11/6/23 CT scans showed stable bone metastases and a stable left adrenal nodule. 07/10/23 CT scans showed stable sclerotic bony metastases. No evidence of new or increasing metastatic disease. 07/10/23 Bone scan showed Metastatic disease not significantly changed compared to the previous bone scan of 7/14/2022. No new osseous lesions. 03/09/23 CT scans showed extensive asymmetric right breast thickening again noted. A 2.3 cm left adrenal mass is without signal change dating back to 7/16/2019. Bilateral renal cysts, largest 11 cm in the right kidney. 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 No hydronephrosis. Multiple sclerotic metastases appear unchanged. Mild degenerative changes in the spine. No significant interval change since prior. No new findings. 11/9/22 CT scans showed stable sclerotic bony metastases and no evidence of new or increasing metastatic disease. 7/14/22 CT scans showed stable sclerotic bony metastases and no new metastatic disease 7/5/22 Bone scan showed stable disease and no new lesions 3/14/22 CT scan C/A/P showed unchanged extent of osteoblastic osseous metastases and no new metastatic disease. 1/31/22 Breast MRI showed diffuse mild right breast skin thickening and parenchymal edema with no foal enhancing mass. No suspicious enhancement in the left breast. No significant axillary or internal mammary lymphadenopathy. 11/2/21 CT scan C/A/P showed stable sclerotic osseous metastatic lesions without significant change. No evidence of any new cancer lesions on scan. 07/09/21 Bone scan shows no new osseous lesions and no significant change in existing lesions. 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 07/09/21 CT scan C/A/P shows stable osseous lesions and no new findings 03/02/21 CT scan C/A/P showed grossly stable bone metastases. No other evidence for metastatic disease. Stable skin thickening of the right breast. 10/29/20 CT scan C/A/P showed multiple blastic bone metastases demonstrating no change since the prior exam. No other evidence of metastatic disease. 07/10/20 Bone scan showed osseous metastatic disease not significantly changed. Degenerative disease in the major joints. No new osseous lesions. 07/10/20 CT scan showed Stable exam. Sclerotic osseous metastases unchanged 3/11/20 CT scan showed Osseous metastatic disease, unchanged. No new foci of metastatic disease. 7/16/19 CT Scan showed stable examination. Sclerotic osseous metastatic disease without significant change. 7/16/19 Bone scan showed no significant interval change compared to the previous bone scan of 7/11/18, showing resolved or less prominent metastatic lesions from 6/27/17. No new osseous lesions are identified. 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  CT scan from 3/15/19 showed stable 2 cm left adrenal adenoma, small likely angiolipoma upper pole left kidney and right renal cyst unchanged. Stable radiographic distribution of sclerotic metastases.  CT scan from 11/2/18: Stable with no new findings CT scan from 7/11/18: Stable with no new findings Bone scan 7/11/18 stable to improved with no new areas of disease CT scan from 4/9/18: Stable osseous metastases and no new findings. CT scan from 1/16/18: Stable osseous metastases. No radiographic evidence of visceral metastasis with a stable 2 cm indeterminate left adrenal nodule CT scan from 11/11/2019 Stable osseous metastases, no new areas of disease

## 2024-09-11 NOTE — HISTORY OF PRESENT ILLNESS
[Disease: _____________________] : Disease: [unfilled] [T: ___] : T[unfilled] [N: ___] : N[unfilled] [M: ___] : M[unfilled] [AJCC Stage: ____] : AJCC Stage: [unfilled] [de-identified] : Right breast cancer diagnosed at the age of 52. 11/14 Presented with right breast mastitis and treated with antibiotics. Multiple recurrence of mastitis over next two years, 3-4 per year with increased frequency in 2016-17. 6/13/16 She had a 3 mm punch biopsy of the right breast which revealed interstitial neutrophilic infiltrate. 04/25/17 Bilateral breast mammogram showed no significant interval change in the appearance of the diffuse breast edema and diffuse skin thickening in the right breast with no specific evidence of adenopathy or underlying abnormality. New skin thickening and increase parenchymal density in the left LIQ. Finding are indeterminate and biopsy was suggested. Bilateral breast ultrasound showed diffuse right sided skin thickening with focal left medial new skin thickening. 06/02/17 Diagnostic mammogram revealed signs of breast edema with skin thickening, progressive since prior studies. D/DX cellulitis vs. mastitis vs. inflammatory breast cancer. 06/03/17 Bilateral breast ultrasound again showed nonspecific and recurrent signs of inflammation in the right breast, consisting of skin thickening and breast edema. Patient reported having pain, erythema, fever and shaking chills, slightly better with antibiotics. 06/22/17 Right central breast, sonogram guided core biopsy showed invasive ductal carcinoma, SBR 6/9, ER 95%, MT 30% and HER 2 negative and DCIS solid pattern with intermediate grade nuclear atypia. 06/26/17 MRI of the breast showed the recently diagnosed right breast malignancy, with diffuse skin thickening and asymmetric parenchymal enhancement. A 5 mm enhancing nodule in the right upper outer breast, possibly representing intramammary lymph node. No suspicious findings in the left breast. 06/27/17 CT scan of C/A/P showed an irregular right sided breast thickening compatible with biopsy proven right breast cancer. Osseous metastases involving the T10, T11 and L4 vertebral bodies. Indeterminate 2 cm left adrenal nodule. 06/27/17 Bone scan showed abnormal bone scan compatible with multifocal osseous metastases in the thoracolumbar spine, left iliac bone and in the right acetabulum. 7/14/17 BSO  7/21/17 - 7/2024 Letrozole 2.5 mg daily and Ibrance 125 mg days 1-21 every 28 days. Discontinued after 7 years due to POD in liver. 7/2017 Xgeva started every 3 months and then decreased to every 6 months in 2018 due to good cancer control and to lower risk of ONJ. 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  7/18/24 Orserdu 345 mg po daily. 8/15/24 Verzenio 150 mg po bid  [de-identified] : Invasive ductal carcinoma, ER 95%, FL 30% and HER 2 negative [de-identified] : Nasrin started letrozole and Ibrance in 7/17 and has done very well aside from arthralgias but recent CT scan showed POD in the liver. She started Orserdu in 7/24 and then Verzenio in 8/24. These scripts must go to [id number]. She is tolerating the Orserdu well but has been having significant diarrhea from the Verzenio. Encouraged her to take Imodium as needed and not hold off. Foot and ankle pain much better since stopping letrozole. She has had two recent cardiac episodes that each lasted about 5 minutes where she felt her heart pounding with associated chest pressure, but no radiating pain. She felt dizzy when she stood up. This happened once at night and woke her up and the second time during the day when she was sitting. She recently saw a local cardiologist who is evaluating her with a Holter monitor, echo and stress test. Her older daughters (twins) graduated in 2023 from [employer] in speech pathology (Janiya) and hospital administration/ (Ivonne). Her younger daughter (Cassie) completed her masters degree in 5/23 at [employer] in business management. She was unsuccessful in her bid to join the [employer] track team for the Olympics. One of her 26 year-old twins (Janiya) was diagnosed with a large ovarian mass in 9/23. She has a history of polycystic ovary syndrome. She had a USO at [facility] which showed well differentiated Stage IA ovarian cancer, so no chemo was recommended.  7/17/24 MRI of the abdomen showed mild diffuse hepatic steatosis. Diffuse innumerable widespread hepatic metastases ranging in size from the majority of which are subcentimeter up to 2.2 cm within segment 8. There is additionally a peripherally located 1.8 cm metastasis within segment 4A. Increased bone mets. 7/8/24 CT scans showed new findings identified in the liver including confluent small hypoechoic nodules suspicious for progression of disease. Additionally, there appear to be areas of low attenuation in segment 4 and segment 7 which may represent developing disease. MRI may further help to assess what this represents underlying metastatic disease in these areas. Distribution and appearance of osseous metastasis is unchanged 3/4/24 CT scans showed unchanged bony metastases. No lymphadenopathy. Stable left adrenal nodule. 11/6/23 CT scans showed stable bone metastases and a stable left adrenal nodule. 07/10/23 CT scans showed stable sclerotic bony metastases. No evidence of new or increasing metastatic disease. 07/10/23 Bone scan showed Metastatic disease not significantly changed compared to the previous bone scan of 7/14/2022. No new osseous lesions. 03/09/23 CT scans showed extensive asymmetric right breast thickening again noted. A 2.3 cm left adrenal mass is without signal change dating back to 7/16/2019. Bilateral renal cysts, largest 11 cm in the right kidney. 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 No hydronephrosis. Multiple sclerotic metastases appear unchanged. Mild degenerative changes in the spine. No significant interval change since prior. No new findings. 11/9/22 CT scans showed stable sclerotic bony metastases and no evidence of new or increasing metastatic disease. 7/14/22 CT scans showed stable sclerotic bony metastases and no new metastatic disease 7/5/22 Bone scan showed stable disease and no new lesions 3/14/22 CT scan C/A/P showed unchanged extent of osteoblastic osseous metastases and no new metastatic disease. 1/31/22 Breast MRI showed diffuse mild right breast skin thickening and parenchymal edema with no foal enhancing mass. No suspicious enhancement in the left breast. No significant axillary or internal mammary lymphadenopathy. 11/2/21 CT scan C/A/P showed stable sclerotic osseous metastatic lesions without significant change. No evidence of any new cancer lesions on scan. 07/09/21 Bone scan shows no new osseous lesions and no significant change in existing lesions. 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 07/09/21 CT scan C/A/P shows stable osseous lesions and no new findings 03/02/21 CT scan C/A/P showed grossly stable bone metastases. No other evidence for metastatic disease. Stable skin thickening of the right breast. 10/29/20 CT scan C/A/P showed multiple blastic bone metastases demonstrating no change since the prior exam. No other evidence of metastatic disease. 07/10/20 Bone scan showed osseous metastatic disease not significantly changed. Degenerative disease in the major joints. No new osseous lesions. 07/10/20 CT scan showed Stable exam. Sclerotic osseous metastases unchanged 3/11/20 CT scan showed Osseous metastatic disease, unchanged. No new foci of metastatic disease. 7/16/19 CT Scan showed stable examination. Sclerotic osseous metastatic disease without significant change. 7/16/19 Bone scan showed no significant interval change compared to the previous bone scan of 7/11/18, showing resolved or less prominent metastatic lesions from 6/27/17. No new osseous lesions are identified. 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  CT scan from 3/15/19 showed stable 2 cm left adrenal adenoma, small likely angiolipoma upper pole left kidney and right renal cyst unchanged. Stable radiographic distribution of sclerotic metastases.  CT scan from 11/2/18: Stable with no new findings CT scan from 7/11/18: Stable with no new findings Bone scan 7/11/18 stable to improved with no new areas of disease CT scan from 4/9/18: Stable osseous metastases and no new findings. CT scan from 1/16/18: Stable osseous metastases. No radiographic evidence of visceral metastasis with a stable 2 cm indeterminate left adrenal nodule CT scan from 11/11/2019 Stable osseous metastases, no new areas of disease

## 2024-09-11 NOTE — PHYSICAL EXAM
[Fully active, able to carry on all pre-disease performance without restriction] : Status 0 - Fully active, able to carry on all pre-disease performance without restriction [Normal] : affect appropriate [de-identified] : Right breast with increasingly normal tissue, central mass no longer clearly palpable

## 2024-09-12 LAB
ALBUMIN SERPL ELPH-MCNC: 4.1 G/DL
ALP BLD-CCNC: 54 U/L
ALT SERPL-CCNC: 28 U/L
ANION GAP SERPL CALC-SCNC: 11 MMOL/L
AST SERPL-CCNC: 31 U/L
BILIRUB SERPL-MCNC: 0.2 MG/DL
BUN SERPL-MCNC: 21 MG/DL
CALCIUM SERPL-MCNC: 9.5 MG/DL
CEA SERPL-MCNC: 12.6 NG/ML
CHLORIDE SERPL-SCNC: 108 MMOL/L
CHOLEST SERPL-MCNC: 224 MG/DL
CO2 SERPL-SCNC: 24 MMOL/L
CREAT SERPL-MCNC: 1.21 MG/DL
EGFR: 52 ML/MIN/1.73M2
GLUCOSE SERPL-MCNC: 96 MG/DL
HDLC SERPL-MCNC: 121 MG/DL
LDLC SERPL CALC-MCNC: 86 MG/DL
NONHDLC SERPL-MCNC: 103 MG/DL
POTASSIUM SERPL-SCNC: 4.5 MMOL/L
PROT SERPL-MCNC: 6.6 G/DL
SODIUM SERPL-SCNC: 143 MMOL/L
TRIGL SERPL-MCNC: 105 MG/DL

## 2024-10-30 ENCOUNTER — RX RENEWAL (OUTPATIENT)
Age: 59
End: 2024-10-30

## 2024-11-06 ENCOUNTER — OUTPATIENT (OUTPATIENT)
Dept: OUTPATIENT SERVICES | Facility: HOSPITAL | Age: 59
LOS: 1 days | End: 2024-11-06
Payer: MEDICARE

## 2024-11-06 ENCOUNTER — OUTPATIENT (OUTPATIENT)
Dept: OUTPATIENT SERVICES | Facility: HOSPITAL | Age: 59
LOS: 1 days | Discharge: HOME | End: 2024-11-06

## 2024-11-06 ENCOUNTER — APPOINTMENT (OUTPATIENT)
Dept: CT IMAGING | Facility: CLINIC | Age: 59
End: 2024-11-06
Payer: MEDICARE

## 2024-11-06 ENCOUNTER — RESULT REVIEW (OUTPATIENT)
Age: 59
End: 2024-11-06

## 2024-11-06 DIAGNOSIS — C50.911 MALIGNANT NEOPLASM OF UNSPECIFIED SITE OF RIGHT FEMALE BREAST: ICD-10-CM

## 2024-11-06 DIAGNOSIS — Z90.49 ACQUIRED ABSENCE OF OTHER SPECIFIED PARTS OF DIGESTIVE TRACT: Chronic | ICD-10-CM

## 2024-11-06 DIAGNOSIS — Z98.891 HISTORY OF UTERINE SCAR FROM PREVIOUS SURGERY: Chronic | ICD-10-CM

## 2024-11-06 PROCEDURE — 74177 CT ABD & PELVIS W/CONTRAST: CPT | Mod: 26,MH

## 2024-11-06 PROCEDURE — 71260 CT THORAX DX C+: CPT | Mod: 26,MH

## 2024-11-12 ENCOUNTER — APPOINTMENT (OUTPATIENT)
Dept: HEMATOLOGY ONCOLOGY | Facility: CLINIC | Age: 59
End: 2024-11-12

## 2024-11-12 ENCOUNTER — APPOINTMENT (OUTPATIENT)
Dept: HEMATOLOGY ONCOLOGY | Facility: CLINIC | Age: 59
End: 2024-11-12
Payer: MEDICARE

## 2024-11-12 ENCOUNTER — RESULT REVIEW (OUTPATIENT)
Age: 59
End: 2024-11-12

## 2024-11-12 ENCOUNTER — APPOINTMENT (OUTPATIENT)
Dept: INFUSION THERAPY | Facility: HOSPITAL | Age: 59
End: 2024-11-12

## 2024-11-12 VITALS
OXYGEN SATURATION: 97 % | TEMPERATURE: 97 F | DIASTOLIC BLOOD PRESSURE: 84 MMHG | BODY MASS INDEX: 35.56 KG/M2 | WEIGHT: 227.05 LBS | SYSTOLIC BLOOD PRESSURE: 139 MMHG | RESPIRATION RATE: 17 BRPM | HEART RATE: 100 BPM

## 2024-11-12 DIAGNOSIS — L03.90 CELLULITIS, UNSPECIFIED: ICD-10-CM

## 2024-11-12 DIAGNOSIS — K52.1 TOXIC GASTROENTERITIS AND COLITIS: ICD-10-CM

## 2024-11-12 DIAGNOSIS — C79.51 SECONDARY MALIGNANT NEOPLASM OF BONE: ICD-10-CM

## 2024-11-12 DIAGNOSIS — C79.51 MALIGNANT NEOPLASM OF UNSPECIFIED SITE OF RIGHT FEMALE BREAST: ICD-10-CM

## 2024-11-12 DIAGNOSIS — C50.911 MALIGNANT NEOPLASM OF UNSPECIFIED SITE OF RIGHT FEMALE BREAST: ICD-10-CM

## 2024-11-12 DIAGNOSIS — M25.50 PAIN IN UNSPECIFIED JOINT: ICD-10-CM

## 2024-11-12 LAB
BASOPHILS # BLD AUTO: 0.07 K/UL — SIGNIFICANT CHANGE UP (ref 0–0.2)
BASOPHILS NFR BLD AUTO: 1.5 % — SIGNIFICANT CHANGE UP (ref 0–2)
EOSINOPHIL # BLD AUTO: 0.09 K/UL — SIGNIFICANT CHANGE UP (ref 0–0.5)
EOSINOPHIL NFR BLD AUTO: 1.9 % — SIGNIFICANT CHANGE UP (ref 0–6)
HCT VFR BLD CALC: 42.1 % — SIGNIFICANT CHANGE UP (ref 34.5–45)
HGB BLD-MCNC: 14.1 G/DL — SIGNIFICANT CHANGE UP (ref 11.5–15.5)
IMM GRANULOCYTES NFR BLD AUTO: 0.2 % — SIGNIFICANT CHANGE UP (ref 0–0.9)
LYMPHOCYTES # BLD AUTO: 1.11 K/UL — SIGNIFICANT CHANGE UP (ref 1–3.3)
LYMPHOCYTES # BLD AUTO: 24 % — SIGNIFICANT CHANGE UP (ref 13–44)
MCHC RBC-ENTMCNC: 33.5 G/DL — SIGNIFICANT CHANGE UP (ref 32–36)
MCHC RBC-ENTMCNC: 34.7 PG — HIGH (ref 27–34)
MCV RBC AUTO: 103.7 FL — HIGH (ref 80–100)
MONOCYTES # BLD AUTO: 0.4 K/UL — SIGNIFICANT CHANGE UP (ref 0–0.9)
MONOCYTES NFR BLD AUTO: 8.7 % — SIGNIFICANT CHANGE UP (ref 2–14)
NEUTROPHILS # BLD AUTO: 2.94 K/UL — SIGNIFICANT CHANGE UP (ref 1.8–7.4)
NEUTROPHILS NFR BLD AUTO: 63.7 % — SIGNIFICANT CHANGE UP (ref 43–77)
NRBC # BLD: 0 /100 WBCS — SIGNIFICANT CHANGE UP (ref 0–0)
NRBC BLD-RTO: 0 /100 WBCS — SIGNIFICANT CHANGE UP (ref 0–0)
PLATELET # BLD AUTO: 188 K/UL — SIGNIFICANT CHANGE UP (ref 150–400)
RBC # BLD: 4.06 M/UL — SIGNIFICANT CHANGE UP (ref 3.8–5.2)
RBC # FLD: 15.5 % — HIGH (ref 10.3–14.5)
WBC # BLD: 4.62 K/UL — SIGNIFICANT CHANGE UP (ref 3.8–10.5)
WBC # FLD AUTO: 4.62 K/UL — SIGNIFICANT CHANGE UP (ref 3.8–10.5)

## 2024-11-12 PROCEDURE — 99214 OFFICE O/P EST MOD 30 MIN: CPT

## 2024-11-12 PROCEDURE — G2211 COMPLEX E/M VISIT ADD ON: CPT

## 2024-11-13 ENCOUNTER — OUTPATIENT (OUTPATIENT)
Dept: OUTPATIENT SERVICES | Facility: HOSPITAL | Age: 59
LOS: 1 days | End: 2024-11-13
Payer: MEDICARE

## 2024-11-13 ENCOUNTER — APPOINTMENT (OUTPATIENT)
Dept: MRI IMAGING | Facility: CLINIC | Age: 59
End: 2024-11-13

## 2024-11-13 DIAGNOSIS — Z90.49 ACQUIRED ABSENCE OF OTHER SPECIFIED PARTS OF DIGESTIVE TRACT: Chronic | ICD-10-CM

## 2024-11-13 DIAGNOSIS — Z98.891 HISTORY OF UTERINE SCAR FROM PREVIOUS SURGERY: Chronic | ICD-10-CM

## 2024-11-13 DIAGNOSIS — C50.911 MALIGNANT NEOPLASM OF UNSPECIFIED SITE OF RIGHT FEMALE BREAST: ICD-10-CM

## 2024-11-13 LAB
ALBUMIN SERPL ELPH-MCNC: 4 G/DL
ALP BLD-CCNC: 66 U/L
ALT SERPL-CCNC: 26 U/L
ANION GAP SERPL CALC-SCNC: 10 MMOL/L
AST SERPL-CCNC: 33 U/L
BILIRUB SERPL-MCNC: 0.3 MG/DL
BUN SERPL-MCNC: 24 MG/DL
CALCIUM SERPL-MCNC: 10.4 MG/DL
CANCER AG27-29 SERPL-ACNC: 81.3 U/ML
CEA SERPL-MCNC: 10.3 NG/ML
CHLORIDE SERPL-SCNC: 106 MMOL/L
CO2 SERPL-SCNC: 25 MMOL/L
CREAT SERPL-MCNC: 1.11 MG/DL
EGFR: 58 ML/MIN/1.73M2
GLUCOSE SERPL-MCNC: 90 MG/DL
POTASSIUM SERPL-SCNC: 4.7 MMOL/L
PROT SERPL-MCNC: 6.9 G/DL
SODIUM SERPL-SCNC: 141 MMOL/L

## 2024-11-13 PROCEDURE — 74183 MRI ABD W/O CNTR FLWD CNTR: CPT | Mod: 26,MH

## 2024-11-13 PROCEDURE — 74183 MRI ABD W/O CNTR FLWD CNTR: CPT

## 2024-11-13 PROCEDURE — A9585: CPT

## 2024-11-20 PROCEDURE — 74177 CT ABD & PELVIS W/CONTRAST: CPT | Mod: MH

## 2024-11-20 PROCEDURE — 71260 CT THORAX DX C+: CPT | Mod: MH

## 2024-12-17 ENCOUNTER — LABORATORY RESULT (OUTPATIENT)
Age: 59
End: 2024-12-17

## 2025-01-03 ENCOUNTER — RX RENEWAL (OUTPATIENT)
Age: 60
End: 2025-01-03

## 2025-01-16 ENCOUNTER — OUTPATIENT (OUTPATIENT)
Dept: OUTPATIENT SERVICES | Facility: HOSPITAL | Age: 60
LOS: 1 days | Discharge: HOME | End: 2025-01-16

## 2025-01-16 DIAGNOSIS — Z90.49 ACQUIRED ABSENCE OF OTHER SPECIFIED PARTS OF DIGESTIVE TRACT: Chronic | ICD-10-CM

## 2025-01-16 DIAGNOSIS — C50.911 MALIGNANT NEOPLASM OF UNSPECIFIED SITE OF RIGHT FEMALE BREAST: ICD-10-CM

## 2025-01-16 DIAGNOSIS — C79.51 SECONDARY MALIGNANT NEOPLASM OF BONE: ICD-10-CM

## 2025-01-16 DIAGNOSIS — Z98.891 HISTORY OF UTERINE SCAR FROM PREVIOUS SURGERY: Chronic | ICD-10-CM

## 2025-01-21 ENCOUNTER — APPOINTMENT (OUTPATIENT)
Dept: HEMATOLOGY ONCOLOGY | Facility: CLINIC | Age: 60
End: 2025-01-21
Payer: MEDICARE

## 2025-01-21 ENCOUNTER — RESULT REVIEW (OUTPATIENT)
Age: 60
End: 2025-01-21

## 2025-01-21 ENCOUNTER — TRANSCRIPTION ENCOUNTER (OUTPATIENT)
Age: 60
End: 2025-01-21

## 2025-01-21 VITALS
WEIGHT: 224.87 LBS | HEART RATE: 106 BPM | SYSTOLIC BLOOD PRESSURE: 125 MMHG | RESPIRATION RATE: 17 BRPM | TEMPERATURE: 99.4 F | DIASTOLIC BLOOD PRESSURE: 82 MMHG | OXYGEN SATURATION: 98 % | HEIGHT: 65.87 IN | BODY MASS INDEX: 36.57 KG/M2

## 2025-01-21 DIAGNOSIS — K52.1 TOXIC GASTROENTERITIS AND COLITIS: ICD-10-CM

## 2025-01-21 DIAGNOSIS — C79.51 MALIGNANT NEOPLASM OF UNSPECIFIED SITE OF RIGHT FEMALE BREAST: ICD-10-CM

## 2025-01-21 DIAGNOSIS — C50.911 MALIGNANT NEOPLASM OF UNSPECIFIED SITE OF RIGHT FEMALE BREAST: ICD-10-CM

## 2025-01-21 LAB
BASOPHILS # BLD AUTO: 0.06 K/UL — SIGNIFICANT CHANGE UP (ref 0–0.2)
BASOPHILS NFR BLD AUTO: 1.5 % — SIGNIFICANT CHANGE UP (ref 0–2)
EOSINOPHIL # BLD AUTO: 0.06 K/UL — SIGNIFICANT CHANGE UP (ref 0–0.5)
EOSINOPHIL NFR BLD AUTO: 1.5 % — SIGNIFICANT CHANGE UP (ref 0–6)
HCT VFR BLD CALC: 40.3 % — SIGNIFICANT CHANGE UP (ref 34.5–45)
HGB BLD-MCNC: 13.3 G/DL — SIGNIFICANT CHANGE UP (ref 11.5–15.5)
IMM GRANULOCYTES NFR BLD AUTO: 0.3 % — SIGNIFICANT CHANGE UP (ref 0–0.9)
LYMPHOCYTES # BLD AUTO: 0.89 K/UL — LOW (ref 1–3.3)
LYMPHOCYTES # BLD AUTO: 22.4 % — SIGNIFICANT CHANGE UP (ref 13–44)
MCHC RBC-ENTMCNC: 33 G/DL — SIGNIFICANT CHANGE UP (ref 32–36)
MCHC RBC-ENTMCNC: 35.3 PG — HIGH (ref 27–34)
MCV RBC AUTO: 106.9 FL — HIGH (ref 80–100)
MONOCYTES # BLD AUTO: 0.24 K/UL — SIGNIFICANT CHANGE UP (ref 0–0.9)
MONOCYTES NFR BLD AUTO: 6 % — SIGNIFICANT CHANGE UP (ref 2–14)
NEUTROPHILS # BLD AUTO: 2.72 K/UL — SIGNIFICANT CHANGE UP (ref 1.8–7.4)
NEUTROPHILS NFR BLD AUTO: 68.3 % — SIGNIFICANT CHANGE UP (ref 43–77)
NRBC # BLD: 0 /100 WBCS — SIGNIFICANT CHANGE UP (ref 0–0)
NRBC BLD-RTO: 0 /100 WBCS — SIGNIFICANT CHANGE UP (ref 0–0)
PLATELET # BLD AUTO: 166 K/UL — SIGNIFICANT CHANGE UP (ref 150–400)
RBC # BLD: 3.77 M/UL — LOW (ref 3.8–5.2)
RBC # FLD: 12.4 % — SIGNIFICANT CHANGE UP (ref 10.3–14.5)
WBC # BLD: 3.98 K/UL — SIGNIFICANT CHANGE UP (ref 3.8–10.5)
WBC # FLD AUTO: 3.98 K/UL — SIGNIFICANT CHANGE UP (ref 3.8–10.5)

## 2025-01-21 PROCEDURE — G2211 COMPLEX E/M VISIT ADD ON: CPT

## 2025-01-21 PROCEDURE — 99214 OFFICE O/P EST MOD 30 MIN: CPT

## 2025-01-22 LAB
ALBUMIN SERPL ELPH-MCNC: 3.7 G/DL
ALP BLD-CCNC: 64 U/L
ALT SERPL-CCNC: 31 U/L
ANION GAP SERPL CALC-SCNC: 8 MMOL/L
AST SERPL-CCNC: 33 U/L
BILIRUB SERPL-MCNC: 0.3 MG/DL
BUN SERPL-MCNC: 22 MG/DL
CALCIUM SERPL-MCNC: 9.6 MG/DL
CANCER AG27-29 SERPL-ACNC: 111.8 U/ML
CEA SERPL-MCNC: 11.1 NG/ML
CHLORIDE SERPL-SCNC: 107 MMOL/L
CO2 SERPL-SCNC: 27 MMOL/L
CREAT SERPL-MCNC: 1.11 MG/DL
EGFR: 58 ML/MIN/1.73M2
GLUCOSE SERPL-MCNC: 88 MG/DL
POTASSIUM SERPL-SCNC: 4.3 MMOL/L
PROT SERPL-MCNC: 6.2 G/DL
SODIUM SERPL-SCNC: 142 MMOL/L

## 2025-03-04 ENCOUNTER — APPOINTMENT (OUTPATIENT)
Dept: CT IMAGING | Facility: CLINIC | Age: 60
End: 2025-03-04
Payer: MEDICARE

## 2025-03-04 ENCOUNTER — OUTPATIENT (OUTPATIENT)
Dept: OUTPATIENT SERVICES | Facility: HOSPITAL | Age: 60
LOS: 1 days | End: 2025-03-04
Payer: MEDICARE

## 2025-03-04 DIAGNOSIS — Z98.891 HISTORY OF UTERINE SCAR FROM PREVIOUS SURGERY: Chronic | ICD-10-CM

## 2025-03-04 DIAGNOSIS — C50.911 MALIGNANT NEOPLASM OF UNSPECIFIED SITE OF RIGHT FEMALE BREAST: ICD-10-CM

## 2025-03-04 DIAGNOSIS — Z90.49 ACQUIRED ABSENCE OF OTHER SPECIFIED PARTS OF DIGESTIVE TRACT: Chronic | ICD-10-CM

## 2025-03-04 PROCEDURE — 71260 CT THORAX DX C+: CPT | Mod: 26

## 2025-03-04 PROCEDURE — 74177 CT ABD & PELVIS W/CONTRAST: CPT | Mod: 26

## 2025-03-04 PROCEDURE — 71260 CT THORAX DX C+: CPT

## 2025-03-04 PROCEDURE — 74177 CT ABD & PELVIS W/CONTRAST: CPT

## 2025-03-18 ENCOUNTER — OUTPATIENT (OUTPATIENT)
Dept: OUTPATIENT SERVICES | Facility: HOSPITAL | Age: 60
LOS: 1 days | Discharge: HOME | End: 2025-03-18

## 2025-03-18 DIAGNOSIS — C79.51 SECONDARY MALIGNANT NEOPLASM OF BONE: ICD-10-CM

## 2025-03-18 DIAGNOSIS — Z98.891 HISTORY OF UTERINE SCAR FROM PREVIOUS SURGERY: Chronic | ICD-10-CM

## 2025-03-18 DIAGNOSIS — C50.911 MALIGNANT NEOPLASM OF UNSPECIFIED SITE OF RIGHT FEMALE BREAST: ICD-10-CM

## 2025-03-18 DIAGNOSIS — Z90.49 ACQUIRED ABSENCE OF OTHER SPECIFIED PARTS OF DIGESTIVE TRACT: Chronic | ICD-10-CM

## 2025-03-20 ENCOUNTER — RESULT REVIEW (OUTPATIENT)
Age: 60
End: 2025-03-20

## 2025-03-20 ENCOUNTER — APPOINTMENT (OUTPATIENT)
Dept: HEMATOLOGY ONCOLOGY | Facility: CLINIC | Age: 60
End: 2025-03-20
Payer: MEDICARE

## 2025-03-20 VITALS
BODY MASS INDEX: 35.29 KG/M2 | SYSTOLIC BLOOD PRESSURE: 117 MMHG | HEART RATE: 99 BPM | WEIGHT: 224.87 LBS | TEMPERATURE: 98 F | DIASTOLIC BLOOD PRESSURE: 87 MMHG | OXYGEN SATURATION: 98 % | HEIGHT: 67.01 IN | RESPIRATION RATE: 17 BRPM

## 2025-03-20 DIAGNOSIS — F43.23 ADJUSTMENT DISORDER WITH MIXED ANXIETY AND DEPRESSED MOOD: ICD-10-CM

## 2025-03-20 DIAGNOSIS — K52.1 TOXIC GASTROENTERITIS AND COLITIS: ICD-10-CM

## 2025-03-20 DIAGNOSIS — C50.911 MALIGNANT NEOPLASM OF UNSPECIFIED SITE OF RIGHT FEMALE BREAST: ICD-10-CM

## 2025-03-20 DIAGNOSIS — M25.50 PAIN IN UNSPECIFIED JOINT: ICD-10-CM

## 2025-03-20 DIAGNOSIS — C79.51 MALIGNANT NEOPLASM OF UNSPECIFIED SITE OF RIGHT FEMALE BREAST: ICD-10-CM

## 2025-03-20 LAB
ALBUMIN SERPL ELPH-MCNC: 3.5 G/DL
ALP BLD-CCNC: 84 U/L
ALT SERPL-CCNC: 46 U/L
ANION GAP SERPL CALC-SCNC: 10 MMOL/L
AST SERPL-CCNC: 51 U/L
BASOPHILS # BLD AUTO: 0.06 K/UL — SIGNIFICANT CHANGE UP (ref 0–0.2)
BASOPHILS NFR BLD AUTO: 1.7 % — SIGNIFICANT CHANGE UP (ref 0–2)
BILIRUB SERPL-MCNC: 0.4 MG/DL
BUN SERPL-MCNC: 21 MG/DL
CALCIUM SERPL-MCNC: 9.8 MG/DL
CEA SERPL-MCNC: 13.1 NG/ML
CHLORIDE SERPL-SCNC: 106 MMOL/L
CO2 SERPL-SCNC: 23 MMOL/L
CREAT SERPL-MCNC: 1.01 MG/DL
EGFRCR SERPLBLD CKD-EPI 2021: 65 ML/MIN/1.73M2
EOSINOPHIL # BLD AUTO: 0.07 K/UL — SIGNIFICANT CHANGE UP (ref 0–0.5)
EOSINOPHIL NFR BLD AUTO: 1.9 % — SIGNIFICANT CHANGE UP (ref 0–6)
GLUCOSE SERPL-MCNC: 83 MG/DL
HCT VFR BLD CALC: 40.6 % — SIGNIFICANT CHANGE UP (ref 34.5–45)
HGB BLD-MCNC: 13.7 G/DL — SIGNIFICANT CHANGE UP (ref 11.5–15.5)
IMM GRANULOCYTES NFR BLD AUTO: 0.3 % — SIGNIFICANT CHANGE UP (ref 0–0.9)
LYMPHOCYTES # BLD AUTO: 0.88 K/UL — LOW (ref 1–3.3)
LYMPHOCYTES # BLD AUTO: 24.3 % — SIGNIFICANT CHANGE UP (ref 13–44)
MCHC RBC-ENTMCNC: 33.7 G/DL — SIGNIFICANT CHANGE UP (ref 32–36)
MCHC RBC-ENTMCNC: 35.2 PG — HIGH (ref 27–34)
MCV RBC AUTO: 104.4 FL — HIGH (ref 80–100)
MONOCYTES # BLD AUTO: 0.36 K/UL — SIGNIFICANT CHANGE UP (ref 0–0.9)
MONOCYTES NFR BLD AUTO: 9.9 % — SIGNIFICANT CHANGE UP (ref 2–14)
NEUTROPHILS # BLD AUTO: 2.24 K/UL — SIGNIFICANT CHANGE UP (ref 1.8–7.4)
NEUTROPHILS NFR BLD AUTO: 61.9 % — SIGNIFICANT CHANGE UP (ref 43–77)
NRBC BLD AUTO-RTO: 0 /100 WBCS — SIGNIFICANT CHANGE UP (ref 0–0)
PLATELET # BLD AUTO: 151 K/UL — SIGNIFICANT CHANGE UP (ref 150–400)
POTASSIUM SERPL-SCNC: 4.6 MMOL/L
PROT SERPL-MCNC: 6.2 G/DL
RBC # BLD: 3.89 M/UL — SIGNIFICANT CHANGE UP (ref 3.8–5.2)
RBC # FLD: 12.5 % — SIGNIFICANT CHANGE UP (ref 10.3–14.5)
SODIUM SERPL-SCNC: 139 MMOL/L
WBC # BLD: 3.62 K/UL — LOW (ref 3.8–10.5)
WBC # FLD AUTO: 3.62 K/UL — LOW (ref 3.8–10.5)

## 2025-03-20 PROCEDURE — G2211 COMPLEX E/M VISIT ADD ON: CPT

## 2025-03-20 PROCEDURE — 99214 OFFICE O/P EST MOD 30 MIN: CPT

## 2025-03-20 RX ORDER — TRAZODONE HYDROCHLORIDE 50 MG/1
50 TABLET ORAL
Qty: 30 | Refills: 5 | Status: ACTIVE | COMMUNITY
Start: 2025-03-20 | End: 1900-01-01

## 2025-03-21 LAB — CANCER AG27-29 SERPL-ACNC: 174.8 U/ML

## 2025-03-28 ENCOUNTER — APPOINTMENT (OUTPATIENT)
Dept: MRI IMAGING | Facility: CLINIC | Age: 60
End: 2025-03-28

## 2025-03-28 PROCEDURE — A9585: CPT | Mod: JZ

## 2025-03-28 PROCEDURE — 74183 MRI ABD W/O CNTR FLWD CNTR: CPT

## 2025-04-08 RX ORDER — CAPECITABINE 500 MG/1
500 TABLET, FILM COATED ORAL
Qty: 112 | Refills: 3 | Status: ACTIVE | COMMUNITY
Start: 2025-04-08 | End: 1900-01-01

## 2025-05-15 ENCOUNTER — OUTPATIENT (OUTPATIENT)
Dept: OUTPATIENT SERVICES | Facility: HOSPITAL | Age: 60
LOS: 1 days | Discharge: HOME | End: 2025-05-15

## 2025-05-15 DIAGNOSIS — C50.911 MALIGNANT NEOPLASM OF UNSPECIFIED SITE OF RIGHT FEMALE BREAST: ICD-10-CM

## 2025-05-15 DIAGNOSIS — C79.51 SECONDARY MALIGNANT NEOPLASM OF BONE: ICD-10-CM

## 2025-05-15 DIAGNOSIS — Z90.49 ACQUIRED ABSENCE OF OTHER SPECIFIED PARTS OF DIGESTIVE TRACT: Chronic | ICD-10-CM

## 2025-05-15 DIAGNOSIS — Z98.891 HISTORY OF UTERINE SCAR FROM PREVIOUS SURGERY: Chronic | ICD-10-CM

## 2025-05-20 ENCOUNTER — APPOINTMENT (OUTPATIENT)
Dept: HEMATOLOGY ONCOLOGY | Facility: CLINIC | Age: 60
End: 2025-05-20
Payer: MEDICARE

## 2025-05-20 ENCOUNTER — RESULT REVIEW (OUTPATIENT)
Age: 60
End: 2025-05-20

## 2025-05-20 ENCOUNTER — APPOINTMENT (OUTPATIENT)
Dept: INFUSION THERAPY | Facility: HOSPITAL | Age: 60
End: 2025-05-20

## 2025-05-20 VITALS
RESPIRATION RATE: 17 BRPM | TEMPERATURE: 97.3 F | SYSTOLIC BLOOD PRESSURE: 111 MMHG | HEART RATE: 98 BPM | DIASTOLIC BLOOD PRESSURE: 80 MMHG | BODY MASS INDEX: 34.6 KG/M2 | WEIGHT: 220.46 LBS | OXYGEN SATURATION: 97 % | HEIGHT: 67 IN

## 2025-05-20 DIAGNOSIS — L27.1 LOCALIZED SKIN ERUPTION DUE TO DRUGS AND MEDICAMENTS TAKEN INTERNALLY: ICD-10-CM

## 2025-05-20 DIAGNOSIS — C79.51 MALIGNANT NEOPLASM OF UNSPECIFIED SITE OF RIGHT FEMALE BREAST: ICD-10-CM

## 2025-05-20 DIAGNOSIS — C50.911 MALIGNANT NEOPLASM OF UNSPECIFIED SITE OF RIGHT FEMALE BREAST: ICD-10-CM

## 2025-05-20 DIAGNOSIS — M25.50 PAIN IN UNSPECIFIED JOINT: ICD-10-CM

## 2025-05-20 LAB
BASOPHILS # BLD AUTO: 0.05 K/UL — SIGNIFICANT CHANGE UP (ref 0–0.2)
BASOPHILS NFR BLD AUTO: 1 % — SIGNIFICANT CHANGE UP (ref 0–2)
EOSINOPHIL # BLD AUTO: 0.12 K/UL — SIGNIFICANT CHANGE UP (ref 0–0.5)
EOSINOPHIL NFR BLD AUTO: 2.4 % — SIGNIFICANT CHANGE UP (ref 0–6)
HCT VFR BLD CALC: 39.3 % — SIGNIFICANT CHANGE UP (ref 34.5–45)
HGB BLD-MCNC: 13.3 G/DL — SIGNIFICANT CHANGE UP (ref 11.5–15.5)
IMM GRANULOCYTES NFR BLD AUTO: 0.2 % — SIGNIFICANT CHANGE UP (ref 0–0.9)
LYMPHOCYTES # BLD AUTO: 1.2 K/UL — SIGNIFICANT CHANGE UP (ref 1–3.3)
LYMPHOCYTES # BLD AUTO: 24.1 % — SIGNIFICANT CHANGE UP (ref 13–44)
MCHC RBC-ENTMCNC: 33.8 G/DL — SIGNIFICANT CHANGE UP (ref 32–36)
MCHC RBC-ENTMCNC: 36.1 PG — HIGH (ref 27–34)
MCV RBC AUTO: 106.8 FL — HIGH (ref 80–100)
MONOCYTES # BLD AUTO: 0.51 K/UL — SIGNIFICANT CHANGE UP (ref 0–0.9)
MONOCYTES NFR BLD AUTO: 10.2 % — SIGNIFICANT CHANGE UP (ref 2–14)
NEUTROPHILS # BLD AUTO: 3.09 K/UL — SIGNIFICANT CHANGE UP (ref 1.8–7.4)
NEUTROPHILS NFR BLD AUTO: 62.1 % — SIGNIFICANT CHANGE UP (ref 43–77)
NRBC BLD AUTO-RTO: 0 /100 WBCS — SIGNIFICANT CHANGE UP (ref 0–0)
PLATELET # BLD AUTO: 135 K/UL — LOW (ref 150–400)
RBC # BLD: 3.68 M/UL — LOW (ref 3.8–5.2)
RBC # FLD: 13.6 % — SIGNIFICANT CHANGE UP (ref 10.3–14.5)
WBC # BLD: 4.98 K/UL — SIGNIFICANT CHANGE UP (ref 3.8–10.5)
WBC # FLD AUTO: 4.98 K/UL — SIGNIFICANT CHANGE UP (ref 3.8–10.5)

## 2025-05-20 PROCEDURE — 99214 OFFICE O/P EST MOD 30 MIN: CPT

## 2025-05-20 PROCEDURE — G2211 COMPLEX E/M VISIT ADD ON: CPT

## 2025-05-21 LAB
ALBUMIN SERPL ELPH-MCNC: 3.7 G/DL
ALP BLD-CCNC: 133 U/L
ALT SERPL-CCNC: 49 U/L
ANION GAP SERPL CALC-SCNC: 12 MMOL/L
AST SERPL-CCNC: 46 U/L
BILIRUB SERPL-MCNC: 0.6 MG/DL
BUN SERPL-MCNC: 21 MG/DL
CALCIUM SERPL-MCNC: 9.3 MG/DL
CANCER AG27-29 SERPL-ACNC: 145.4 U/ML
CEA SERPL-MCNC: 36.6 NG/ML
CHLORIDE SERPL-SCNC: 105 MMOL/L
CO2 SERPL-SCNC: 24 MMOL/L
CREAT SERPL-MCNC: 0.94 MG/DL
EGFRCR SERPLBLD CKD-EPI 2021: 70 ML/MIN/1.73M2
GLUCOSE SERPL-MCNC: 86 MG/DL
POTASSIUM SERPL-SCNC: 4.5 MMOL/L
PROT SERPL-MCNC: 6.4 G/DL
SODIUM SERPL-SCNC: 140 MMOL/L

## 2025-06-16 ENCOUNTER — NON-APPOINTMENT (OUTPATIENT)
Age: 60
End: 2025-06-16

## 2025-06-17 RX ORDER — MUPIROCIN 20 MG/G
2 OINTMENT TOPICAL 3 TIMES DAILY
Qty: 1 | Refills: 1 | Status: ACTIVE | COMMUNITY
Start: 2025-06-17 | End: 1900-01-01

## 2025-06-17 RX ORDER — CLOBETASOL PROPIONATE CREAM USP, 0.05% 0.5 MG/G
0.05 CREAM TOPICAL TWICE DAILY
Qty: 1 | Refills: 1 | Status: ACTIVE | COMMUNITY
Start: 2025-06-17 | End: 1900-01-01

## 2025-06-17 RX ORDER — ERGOCALCIFEROL 1.25 MG/1
1.25 MG CAPSULE, LIQUID FILLED ORAL
Qty: 2 | Refills: 0 | Status: ACTIVE | COMMUNITY
Start: 2025-06-17 | End: 1900-01-01

## 2025-06-19 ENCOUNTER — APPOINTMENT (OUTPATIENT)
Dept: DERMATOLOGY | Facility: CLINIC | Age: 60
End: 2025-06-19
Payer: MEDICARE

## 2025-06-19 PROCEDURE — 99204 OFFICE O/P NEW MOD 45 MIN: CPT

## 2025-06-19 PROCEDURE — G2211 COMPLEX E/M VISIT ADD ON: CPT

## 2025-06-19 RX ORDER — CLOBETASOL PROPIONATE 0.5 MG/G
0.05 OINTMENT TOPICAL
Qty: 1 | Refills: 2 | Status: ACTIVE | COMMUNITY
Start: 2025-06-19 | End: 1900-01-01

## 2025-06-19 RX ORDER — TIMOLOL MALEATE 5 MG/ML
0.5 SOLUTION OPHTHALMIC
Qty: 1 | Refills: 2 | Status: ACTIVE | COMMUNITY
Start: 2025-06-19 | End: 1900-01-01

## 2025-06-19 RX ORDER — LIDOCAINE 5 G/100G
5 OINTMENT TOPICAL
Qty: 1 | Refills: 1 | Status: ACTIVE | COMMUNITY
Start: 2025-06-19 | End: 1900-01-01

## 2025-06-24 RX ORDER — DICLOFENAC SODIUM 3 G/100G
3 GEL TOPICAL TWICE DAILY
Qty: 1 | Refills: 2 | Status: ACTIVE | COMMUNITY
Start: 2025-06-19

## 2025-06-27 ENCOUNTER — LABORATORY RESULT (OUTPATIENT)
Age: 60
End: 2025-06-27

## 2025-07-07 ENCOUNTER — APPOINTMENT (OUTPATIENT)
Dept: NUCLEAR MEDICINE | Facility: IMAGING CENTER | Age: 60
End: 2025-07-07
Payer: MEDICARE

## 2025-07-07 ENCOUNTER — APPOINTMENT (OUTPATIENT)
Dept: CT IMAGING | Facility: IMAGING CENTER | Age: 60
End: 2025-07-07
Payer: MEDICARE

## 2025-07-07 ENCOUNTER — OUTPATIENT (OUTPATIENT)
Dept: OUTPATIENT SERVICES | Facility: HOSPITAL | Age: 60
LOS: 1 days | End: 2025-07-07
Payer: MEDICARE

## 2025-07-07 DIAGNOSIS — Z98.891 HISTORY OF UTERINE SCAR FROM PREVIOUS SURGERY: Chronic | ICD-10-CM

## 2025-07-07 DIAGNOSIS — Z90.49 ACQUIRED ABSENCE OF OTHER SPECIFIED PARTS OF DIGESTIVE TRACT: Chronic | ICD-10-CM

## 2025-07-07 DIAGNOSIS — C50.911 MALIGNANT NEOPLASM OF UNSPECIFIED SITE OF RIGHT FEMALE BREAST: ICD-10-CM

## 2025-07-07 PROCEDURE — 78306 BONE IMAGING WHOLE BODY: CPT

## 2025-07-07 PROCEDURE — 71260 CT THORAX DX C+: CPT | Mod: 26

## 2025-07-07 PROCEDURE — 71260 CT THORAX DX C+: CPT

## 2025-07-07 PROCEDURE — 78306 BONE IMAGING WHOLE BODY: CPT | Mod: 26

## 2025-07-07 PROCEDURE — A9561: CPT

## 2025-07-07 PROCEDURE — 74177 CT ABD & PELVIS W/CONTRAST: CPT

## 2025-07-07 PROCEDURE — 74177 CT ABD & PELVIS W/CONTRAST: CPT | Mod: 26

## 2025-07-18 ENCOUNTER — OUTPATIENT (OUTPATIENT)
Dept: OUTPATIENT SERVICES | Facility: HOSPITAL | Age: 60
LOS: 1 days | Discharge: HOME | End: 2025-07-18

## 2025-07-18 DIAGNOSIS — C50.911 MALIGNANT NEOPLASM OF UNSPECIFIED SITE OF RIGHT FEMALE BREAST: ICD-10-CM

## 2025-07-18 DIAGNOSIS — Z98.891 HISTORY OF UTERINE SCAR FROM PREVIOUS SURGERY: Chronic | ICD-10-CM

## 2025-07-18 DIAGNOSIS — C79.51 SECONDARY MALIGNANT NEOPLASM OF BONE: ICD-10-CM

## 2025-07-18 DIAGNOSIS — Z90.49 ACQUIRED ABSENCE OF OTHER SPECIFIED PARTS OF DIGESTIVE TRACT: Chronic | ICD-10-CM

## 2025-07-21 ENCOUNTER — APPOINTMENT (OUTPATIENT)
Dept: HEMATOLOGY ONCOLOGY | Facility: CLINIC | Age: 60
End: 2025-07-21

## 2025-07-21 ENCOUNTER — RESULT REVIEW (OUTPATIENT)
Age: 60
End: 2025-07-21

## 2025-07-21 ENCOUNTER — APPOINTMENT (OUTPATIENT)
Dept: HEMATOLOGY ONCOLOGY | Facility: CLINIC | Age: 60
End: 2025-07-21
Payer: MEDICARE

## 2025-07-21 ENCOUNTER — APPOINTMENT (OUTPATIENT)
Dept: DERMATOLOGY | Facility: CLINIC | Age: 60
End: 2025-07-21
Payer: MEDICARE

## 2025-07-21 VITALS — WEIGHT: 220 LBS | BODY MASS INDEX: 34.46 KG/M2

## 2025-07-21 VITALS
OXYGEN SATURATION: 97 % | SYSTOLIC BLOOD PRESSURE: 118 MMHG | DIASTOLIC BLOOD PRESSURE: 81 MMHG | BODY MASS INDEX: 35.7 KG/M2 | WEIGHT: 227.94 LBS | HEART RATE: 88 BPM | TEMPERATURE: 97 F | RESPIRATION RATE: 16 BRPM

## 2025-07-21 DIAGNOSIS — F51.04 PSYCHOPHYSIOLOGIC INSOMNIA: ICD-10-CM

## 2025-07-21 DIAGNOSIS — L27.1 LOCALIZED SKIN ERUPTION DUE TO DRUGS AND MEDICAMENTS TAKEN INTERNALLY: ICD-10-CM

## 2025-07-21 DIAGNOSIS — L98.0 PYOGENIC GRANULOMA: ICD-10-CM

## 2025-07-21 DIAGNOSIS — C50.911 MALIGNANT NEOPLASM OF UNSPECIFIED SITE OF RIGHT FEMALE BREAST: ICD-10-CM

## 2025-07-21 DIAGNOSIS — K52.1 TOXIC GASTROENTERITIS AND COLITIS: ICD-10-CM

## 2025-07-21 DIAGNOSIS — M25.50 PAIN IN UNSPECIFIED JOINT: ICD-10-CM

## 2025-07-21 DIAGNOSIS — C79.51 MALIGNANT NEOPLASM OF UNSPECIFIED SITE OF RIGHT FEMALE BREAST: ICD-10-CM

## 2025-07-21 LAB
ALBUMIN SERPL ELPH-MCNC: 3.6 G/DL
ALP BLD-CCNC: 124 U/L
ALT SERPL-CCNC: 34 U/L
ANION GAP SERPL CALC-SCNC: 9 MMOL/L
AST SERPL-CCNC: 43 U/L
BASOPHILS # BLD AUTO: 0.02 K/UL — SIGNIFICANT CHANGE UP (ref 0–0.2)
BASOPHILS NFR BLD AUTO: 0.5 % — SIGNIFICANT CHANGE UP (ref 0–2)
BILIRUB SERPL-MCNC: 0.4 MG/DL
BUN SERPL-MCNC: 25 MG/DL
CALCIUM SERPL-MCNC: 9.2 MG/DL
CANCER AG27-29 SERPL-ACNC: 116.4 U/ML
CEA SERPL-MCNC: 15.1 NG/ML
CHLORIDE SERPL-SCNC: 108 MMOL/L
CO2 SERPL-SCNC: 24 MMOL/L
CREAT SERPL-MCNC: 0.69 MG/DL
EGFRCR SERPLBLD CKD-EPI 2021: 100 ML/MIN/1.73M2
EOSINOPHIL # BLD AUTO: 0.08 K/UL — SIGNIFICANT CHANGE UP (ref 0–0.5)
EOSINOPHIL NFR BLD AUTO: 2 % — SIGNIFICANT CHANGE UP (ref 0–6)
GLUCOSE SERPL-MCNC: 120 MG/DL
HCT VFR BLD CALC: 38.1 % — SIGNIFICANT CHANGE UP (ref 34.5–45)
HGB BLD-MCNC: 13 G/DL — SIGNIFICANT CHANGE UP (ref 11.5–15.5)
IMM GRANULOCYTES NFR BLD AUTO: 0.3 % — SIGNIFICANT CHANGE UP (ref 0–0.9)
LYMPHOCYTES # BLD AUTO: 0.77 K/UL — LOW (ref 1–3.3)
LYMPHOCYTES # BLD AUTO: 19.7 % — SIGNIFICANT CHANGE UP (ref 13–44)
MCHC RBC-ENTMCNC: 34.1 G/DL — SIGNIFICANT CHANGE UP (ref 32–36)
MCHC RBC-ENTMCNC: 37 PG — HIGH (ref 27–34)
MCV RBC AUTO: 108.5 FL — HIGH (ref 80–100)
MONOCYTES # BLD AUTO: 0.34 K/UL — SIGNIFICANT CHANGE UP (ref 0–0.9)
MONOCYTES NFR BLD AUTO: 8.7 % — SIGNIFICANT CHANGE UP (ref 2–14)
NEUTROPHILS # BLD AUTO: 2.69 K/UL — SIGNIFICANT CHANGE UP (ref 1.8–7.4)
NEUTROPHILS NFR BLD AUTO: 68.8 % — SIGNIFICANT CHANGE UP (ref 43–77)
NRBC BLD AUTO-RTO: 0 /100 WBCS — SIGNIFICANT CHANGE UP (ref 0–0)
PLATELET # BLD AUTO: 124 K/UL — LOW (ref 150–400)
POTASSIUM SERPL-SCNC: 4.5 MMOL/L
PROT SERPL-MCNC: 5.9 G/DL
RBC # BLD: 3.51 M/UL — LOW (ref 3.8–5.2)
RBC # FLD: 15.3 % — HIGH (ref 10.3–14.5)
SODIUM SERPL-SCNC: 141 MMOL/L
WBC # BLD: 3.91 K/UL — SIGNIFICANT CHANGE UP (ref 3.8–10.5)
WBC # FLD AUTO: 3.91 K/UL — SIGNIFICANT CHANGE UP (ref 3.8–10.5)

## 2025-07-21 PROCEDURE — 99214 OFFICE O/P EST MOD 30 MIN: CPT

## 2025-07-21 PROCEDURE — G2211 COMPLEX E/M VISIT ADD ON: CPT

## 2025-07-21 RX ORDER — UREA 40 G/100G
40 CREAM TOPICAL TWICE DAILY
Qty: 1 | Refills: 6 | Status: ACTIVE | COMMUNITY
Start: 2025-07-21 | End: 1900-01-01

## 2025-08-06 ENCOUNTER — RX RENEWAL (OUTPATIENT)
Age: 60
End: 2025-08-06

## 2025-08-19 ENCOUNTER — LABORATORY RESULT (OUTPATIENT)
Age: 60
End: 2025-08-19

## 2025-08-20 LAB
ALBUMIN SERPL ELPH-MCNC: 3.8 G/DL
ALP BLD-CCNC: 144 U/L
ALT SERPL-CCNC: 45 U/L
ANION GAP SERPL CALC-SCNC: 11 MMOL/L
AST SERPL-CCNC: 55 U/L
BASOPHILS # BLD AUTO: 0.04 K/UL
BASOPHILS NFR BLD AUTO: 0.7 %
BILIRUB SERPL-MCNC: 0.3 MG/DL
BUN SERPL-MCNC: 24 MG/DL
CALCIUM SERPL-MCNC: 9.5 MG/DL
CANCER AG27-29 SERPL-ACNC: 108.7 U/ML
CEA SERPL-MCNC: 15 NG/ML
CHLORIDE SERPL-SCNC: 106 MMOL/L
CO2 SERPL-SCNC: 25 MMOL/L
CREAT SERPL-MCNC: 0.78 MG/DL
EGFRCR SERPLBLD CKD-EPI 2021: 87 ML/MIN/1.73M2
EOSINOPHIL # BLD AUTO: 0.12 K/UL
EOSINOPHIL NFR BLD AUTO: 2.2 %
GLUCOSE SERPL-MCNC: 85 MG/DL
HCT VFR BLD CALC: 41.5 %
HGB BLD-MCNC: 14.1 G/DL
IMM GRANULOCYTES NFR BLD AUTO: 1.3 %
LYMPHOCYTES # BLD AUTO: 1.15 K/UL
LYMPHOCYTES NFR BLD AUTO: 21.1 %
MAN DIFF?: NORMAL
MCHC RBC-ENTMCNC: 34 G/DL
MCHC RBC-ENTMCNC: 37.2 PG
MCV RBC AUTO: 109.5 FL
MONOCYTES # BLD AUTO: 0.69 K/UL
MONOCYTES NFR BLD AUTO: 12.7 %
NEUTROPHILS # BLD AUTO: 3.37 K/UL
NEUTROPHILS NFR BLD AUTO: 62 %
PLATELET # BLD AUTO: 148 K/UL
POTASSIUM SERPL-SCNC: 4.5 MMOL/L
PROT SERPL-MCNC: 6.7 G/DL
RBC # BLD: 3.79 M/UL
RBC # FLD: 14.8 %
SODIUM SERPL-SCNC: 142 MMOL/L
WBC # FLD AUTO: 5.44 K/UL

## 2025-09-15 ENCOUNTER — APPOINTMENT (OUTPATIENT)
Dept: DERMATOLOGY | Facility: CLINIC | Age: 60
End: 2025-09-15
Payer: MEDICARE

## 2025-09-15 DIAGNOSIS — L27.1 LOCALIZED SKIN ERUPTION DUE TO DRUGS AND MEDICAMENTS TAKEN INTERNALLY: ICD-10-CM

## 2025-09-15 PROCEDURE — 99214 OFFICE O/P EST MOD 30 MIN: CPT

## 2025-09-15 PROCEDURE — G2211 COMPLEX E/M VISIT ADD ON: CPT
